# Patient Record
Sex: FEMALE | Race: WHITE | Employment: UNEMPLOYED | URBAN - METROPOLITAN AREA
[De-identification: names, ages, dates, MRNs, and addresses within clinical notes are randomized per-mention and may not be internally consistent; named-entity substitution may affect disease eponyms.]

---

## 2017-04-25 PROBLEM — L03.115 CELLULITIS OF RIGHT LEG: Status: ACTIVE | Noted: 2017-04-25

## 2017-04-26 PROBLEM — D50.9 IRON DEFICIENCY ANEMIA: Status: ACTIVE | Noted: 2017-04-26

## 2017-04-27 PROBLEM — N95.0 POSTMENOPAUSAL BLEEDING: Status: ACTIVE | Noted: 2017-04-27

## 2017-10-16 PROBLEM — I89.0 LYMPHEDEMA OF BOTH LOWER EXTREMITIES: Chronic | Status: ACTIVE | Noted: 2017-10-16

## 2018-03-27 ENCOUNTER — TELEPHONE (OUTPATIENT)
Dept: PRIMARY CARE CLINIC | Age: 58
End: 2018-03-27

## 2018-03-27 NOTE — TELEPHONE ENCOUNTER
Kary Ring from [facility] called in regards to the PT that at this time she does not have a caregiver but they are actively looking for someone to come out to her home to care for her.

## 2018-03-29 ENCOUNTER — OFFICE VISIT (OUTPATIENT)
Dept: PRIMARY CARE CLINIC | Age: 58
End: 2018-03-29
Payer: MEDICARE

## 2018-03-29 VITALS
HEIGHT: 63 IN | BODY MASS INDEX: 51.91 KG/M2 | TEMPERATURE: 98.7 F | OXYGEN SATURATION: 98 % | WEIGHT: 293 LBS | SYSTOLIC BLOOD PRESSURE: 138 MMHG | DIASTOLIC BLOOD PRESSURE: 84 MMHG | HEART RATE: 90 BPM

## 2018-03-29 DIAGNOSIS — I69.359 HEMIPARESIS FOLLOWING CEREBROVASCULAR ACCIDENT (CVA) (HCC): Primary | Chronic | ICD-10-CM

## 2018-03-29 DIAGNOSIS — G44.89 OTHER HEADACHE SYNDROME: ICD-10-CM

## 2018-03-29 DIAGNOSIS — I81 PVT (PORTAL VEIN THROMBOSIS): ICD-10-CM

## 2018-03-29 DIAGNOSIS — R29.898 ANKLE WEAKNESS: ICD-10-CM

## 2018-03-29 LAB
INTERNATIONAL NORMALIZATION RATIO, POC: 2.1
PROTHROMBIN TIME, POC: NORMAL

## 2018-03-29 PROCEDURE — 85610 PROTHROMBIN TIME: CPT | Performed by: FAMILY MEDICINE

## 2018-03-29 PROCEDURE — 1036F TOBACCO NON-USER: CPT | Performed by: FAMILY MEDICINE

## 2018-03-29 PROCEDURE — 3017F COLORECTAL CA SCREEN DOC REV: CPT | Performed by: FAMILY MEDICINE

## 2018-03-29 PROCEDURE — 3014F SCREEN MAMMO DOC REV: CPT | Performed by: FAMILY MEDICINE

## 2018-03-29 PROCEDURE — G8598 ASA/ANTIPLAT THER USED: HCPCS | Performed by: FAMILY MEDICINE

## 2018-03-29 PROCEDURE — G8417 CALC BMI ABV UP PARAM F/U: HCPCS | Performed by: FAMILY MEDICINE

## 2018-03-29 PROCEDURE — G8427 DOCREV CUR MEDS BY ELIG CLIN: HCPCS | Performed by: FAMILY MEDICINE

## 2018-03-29 PROCEDURE — 99214 OFFICE O/P EST MOD 30 MIN: CPT | Performed by: FAMILY MEDICINE

## 2018-03-29 PROCEDURE — G8484 FLU IMMUNIZE NO ADMIN: HCPCS | Performed by: FAMILY MEDICINE

## 2018-03-29 RX ORDER — GABAPENTIN 400 MG/1
300 CAPSULE ORAL 2 TIMES DAILY
Qty: 60 CAPSULE | Refills: 2 | Status: SHIPPED | OUTPATIENT
Start: 2018-03-29 | End: 2018-11-29

## 2018-03-29 ASSESSMENT — ENCOUNTER SYMPTOMS
BLURRED VISION: 0
PHOTOPHOBIA: 0
DIARRHEA: 0
BACK PAIN: 0
NAUSEA: 0
FACIAL SWEATING: 1
RHINORRHEA: 1
ABDOMINAL PAIN: 0
VOMITING: 0
WHEEZING: 0
SHORTNESS OF BREATH: 0
CONSTIPATION: 0
SORE THROAT: 0
SINUS PAIN: 0
SINUS PRESSURE: 0
CHEST TIGHTNESS: 0

## 2018-03-29 NOTE — PROGRESS NOTES
Jeff Celaya, a female of 56 y.o. came to the office 3/29/2018. Patient Active Problem List   Diagnosis    OA (osteoarthritis)    Hypothyroidism    JOSE on CPAP    HTN (hypertension)    PFO with atrial septal aneurysm    Adult BMI >=70 kg/sq m (HCC)    Rt Hemiparesis [facility] cerebrovascular accident ([facility]A) [facility] [address]    Neuropathy ([facility] [address]    Depression    Venous insufficiency of both lower extremities    FLAVIO (generalized anxiety disorder)    Varicose veins of left leg with edema    Cellulitis of right leg    Iron deficiency anemia    Postmenopausal vaginal bleeding    Complex endometrial hyperplasia with atypia    Lymphedema of both lower extremities          Headache    This is a new problem. Episode onset: 2 weeks ago. The problem occurs intermittently (4 times a week ). The problem has been unchanged. The pain is located in the frontal region. The pain does not radiate. The pain quality is not similar to prior headaches. The quality of the pain is described as aching. The pain is at a severity of 8/10. Associated symptoms include facial sweating, neck pain (yesterday occiput ) and rhinorrhea. Pertinent negatives include no abdominal pain, anorexia, back pain, blurred vision, dizziness, nausea, phonophobia, photophobia, sinus pressure, sore throat, tinnitus or vomiting. Nothing aggravates the symptoms. She has tried acetaminophen for the symptoms. The treatment provided moderate relief. Her past medical history is significant for obesity. MSK: Complains of cramps in right leg for past 3 weeks. Almost daily, icy hot and bannanas help. Has been on lipitor since 2015 with out cramps. Still notes right sided swelling. States that face, arm and leg swelling. Right ankle hurts and feels as though it might give out. Increased gabapentin to 400 mg bid for leg numbness and arm on R side. It's starting to help but it's only a wk.      Allergies   Allergen Reactions    Pcn [Penicillins] Shortness Of Breath    Penicillin G Shortness Of Breath       Current Outpatient Prescriptions on File Prior to Visit   Medication Sig Dispense Refill    levothyroxine (SYNTHROID) 175 MCG tablet Take 1 tablet by mouth daily 30 tablet 5    baclofen (LIORESAL) 10 MG tablet Take 1 tablet by mouth See Admin Instructions Take 1/2 tablet twice a day. 30 tablet 5    vitamin D (ERGOCALCIFEROL) 60624 units CAPS capsule Take 1 capsule by mouth once a week 12 capsule 1    warfarin (COUMADIN) 2.5 MG tablet Take 2.5 mg by mouth daily 1 day a week, Tuesday      acetaminophen (TYLENOL ARTHRITIS PAIN) 650 MG extended release tablet Take 1,300 mg by mouth every 8 hours as needed for Pain      lisinopril (PRINIVIL;ZESTRIL) 5 MG tablet TAKE 1/2 TABLET BY MOUTH EVERY DAY 15 tablet 5    busPIRone (BUSPAR) 15 MG tablet TAKE 1/2 TABLET TWICE A DAY 30 tablet 5    warfarin (COUMADIN) 5 MG tablet TAKE 1 TABLET DAILY AS DIRECTED (Patient taking differently: Take 5 mg by mouth daily TAKE 1 TABLET DAILY AS DIRECTED) 30 tablet 5    atorvastatin (LIPITOR) 20 MG tablet Take 1 tablet by mouth nightly 30 tablet 5    citalopram (CELEXA) 40 MG tablet TAKE ONE TABLET BY MOUTH EVERY DAY 30 tablet 5    folic acid (FOLVITE) 1 MG tablet Take 1 tablet by mouth daily 30 tablet 5    CPAP Machine MISC by Does not apply route      levonorgestrel (MIRENA, 52 MG,) IUD 52 mg 1 each by Intrauterine route once      Misc. Devices (GEL-FOAM CUSHION) MISC For chair 1 each 0     No current facility-administered medications on file prior to visit. Review of Systems   Constitutional: Positive for fatigue. HENT: Positive for rhinorrhea. Negative for postnasal drip, sinus pain, sinus pressure, sneezing, sore throat and tinnitus. Eyes: Negative for blurred vision, photophobia and visual disturbance. Respiratory: Negative for chest tightness, shortness of breath and wheezing. Cardiovascular: Negative for chest pain, palpitations and leg swelling. Gastrointestinal: Negative for abdominal pain, anorexia, constipation, diarrhea, nausea and vomiting. Musculoskeletal: Positive for myalgias (right leg) and neck pain (yesterday occiput ). Negative for back pain. Neurological: Positive for headaches. Negative for dizziness. All other review of systems reviewed and are negative    OBJECTIVE:  /84   Pulse 90   Temp 98.7 °F (37.1 °C)   Ht 5' 3\" (1.6 m)   Wt (!) 441 lb (200 kg)   SpO2 98%   BMI 78.12 kg/m²      Physical Exam   Constitutional: She is oriented to person, place, and time. She appears well-developed and well-nourished. HENT:   Head: Normocephalic and atraumatic. Eyes: Conjunctivae and EOM are normal. Pupils are equal, round, and reactive to light. Neck: Normal range of motion. Neck supple. No JVD present. No tracheal deviation present. No thyromegaly present. Cardiovascular: Normal rate, regular rhythm, normal heart sounds and intact distal pulses. Exam reveals no gallop and no friction rub. No murmur heard. Pulmonary/Chest: Effort normal and breath sounds normal. No respiratory distress. She has no wheezes. She has no rales. She exhibits no tenderness. Abdominal: Soft. Bowel sounds are normal. She exhibits no distension and no mass. There is no tenderness. There is no rebound and no guarding. Musculoskeletal: Normal range of motion. She exhibits edema (right leg increased swelling ). Neurological: She is alert and oriented to person, place, and time. No cranial nerve deficit. Skin: Skin is warm and dry. No rash noted. No erythema. No pallor. ASSESSMENT AND PLAN:    Rowena Bojorquez was seen today for headache and leg swelling.     Diagnoses and all orders for this visit:    Rt Hemiparesis following cerebrovascular accident (CVA) ([address]    PVT (portal vein thrombosis)  -     POCT INR    Other headache syndrome    Adult BMI >=70 kg/sq m ([address]  -     [facility] Dedrick Gitelman., MD  -     Cancel:

## 2018-04-05 DIAGNOSIS — E03.9 ACQUIRED HYPOTHYROIDISM: Chronic | ICD-10-CM

## 2018-04-05 RX ORDER — LEVOTHYROXINE SODIUM 175 UG/1
175 TABLET ORAL DAILY
Qty: 30 TABLET | Refills: 5 | Status: SHIPPED | OUTPATIENT
Start: 2018-04-05 | End: 2019-01-03 | Stop reason: SDUPTHER

## 2018-04-13 ENCOUNTER — TELEPHONE (OUTPATIENT)
Dept: PRIMARY CARE CLINIC | Age: 58
End: 2018-04-13

## 2018-04-13 DIAGNOSIS — M17.0 PRIMARY OSTEOARTHRITIS OF BOTH KNEES: Primary | Chronic | ICD-10-CM

## 2018-04-19 ENCOUNTER — PATIENT MESSAGE (OUTPATIENT)
Dept: PRIMARY CARE CLINIC | Age: 58
End: 2018-04-19

## 2018-04-30 ENCOUNTER — INITIAL CONSULT (OUTPATIENT)
Dept: BARIATRICS/WEIGHT MGMT | Age: 58
End: 2018-04-30
Payer: COMMERCIAL

## 2018-04-30 VITALS
HEART RATE: 87 BPM | SYSTOLIC BLOOD PRESSURE: 139 MMHG | DIASTOLIC BLOOD PRESSURE: 83 MMHG | TEMPERATURE: 97.9 F | HEIGHT: 63 IN

## 2018-04-30 DIAGNOSIS — I10 ESSENTIAL HYPERTENSION: Primary | Chronic | ICD-10-CM

## 2018-04-30 PROCEDURE — 99205 OFFICE O/P NEW HI 60 MIN: CPT | Performed by: INTERNAL MEDICINE

## 2018-04-30 PROCEDURE — 99202 OFFICE O/P NEW SF 15 MIN: CPT

## 2018-04-30 RX ORDER — GABAPENTIN 400 MG/1
400 CAPSULE ORAL 2 TIMES DAILY
COMMUNITY
End: 2018-05-31 | Stop reason: SDUPTHER

## 2018-05-08 RX ORDER — FOLIC ACID 1 MG/1
TABLET ORAL
Qty: 30 TABLET | Refills: 5 | Status: SHIPPED | OUTPATIENT
Start: 2018-05-08 | End: 2018-11-05 | Stop reason: SDUPTHER

## 2018-05-25 ENCOUNTER — TELEPHONE (OUTPATIENT)
Dept: PRIMARY CARE CLINIC | Age: 58
End: 2018-05-25

## 2018-05-31 RX ORDER — GABAPENTIN 400 MG/1
400 CAPSULE ORAL 2 TIMES DAILY
Qty: 60 CAPSULE | Refills: 3 | Status: SHIPPED | OUTPATIENT
Start: 2018-05-31 | End: 2018-10-25 | Stop reason: SDUPTHER

## 2018-06-12 DIAGNOSIS — F41.9 ANXIETY: ICD-10-CM

## 2018-06-12 RX ORDER — CITALOPRAM 40 MG/1
TABLET ORAL
Qty: 30 TABLET | Refills: 5 | Status: SHIPPED | OUTPATIENT
Start: 2018-06-12 | End: 2018-06-21 | Stop reason: SDUPTHER

## 2018-06-21 DIAGNOSIS — F41.9 ANXIETY: ICD-10-CM

## 2018-06-21 DIAGNOSIS — I10 BENIGN ESSENTIAL HTN: ICD-10-CM

## 2018-06-21 RX ORDER — BACLOFEN 10 MG/1
10 TABLET ORAL SEE ADMIN INSTRUCTIONS
Qty: 30 TABLET | Refills: 5 | Status: SHIPPED | OUTPATIENT
Start: 2018-06-21 | End: 2019-05-03 | Stop reason: SDUPTHER

## 2018-06-21 RX ORDER — LISINOPRIL 5 MG/1
2.5 TABLET ORAL DAILY
Qty: 15 TABLET | Refills: 5 | Status: SHIPPED | OUTPATIENT
Start: 2018-06-21 | End: 2019-04-10

## 2018-06-21 RX ORDER — ATORVASTATIN CALCIUM 20 MG/1
20 TABLET, FILM COATED ORAL NIGHTLY
Qty: 30 TABLET | Refills: 5 | Status: SHIPPED | OUTPATIENT
Start: 2018-06-21 | End: 2019-01-04 | Stop reason: SDUPTHER

## 2018-06-21 RX ORDER — CITALOPRAM 40 MG/1
40 TABLET ORAL DAILY
Qty: 30 TABLET | Refills: 5 | Status: SHIPPED | OUTPATIENT
Start: 2018-06-21 | End: 2018-11-26 | Stop reason: SDUPTHER

## 2018-06-21 RX ORDER — BUSPIRONE HYDROCHLORIDE 15 MG/1
7.5 TABLET ORAL 2 TIMES DAILY
Qty: 30 TABLET | Refills: 5 | Status: SHIPPED | OUTPATIENT
Start: 2018-06-21 | End: 2018-11-26 | Stop reason: SDUPTHER

## 2018-06-21 RX ORDER — WARFARIN SODIUM 5 MG/1
TABLET ORAL
Qty: 30 TABLET | Refills: 5 | Status: SHIPPED | OUTPATIENT
Start: 2018-06-21 | End: 2019-01-04 | Stop reason: SDUPTHER

## 2018-07-31 ENCOUNTER — OFFICE VISIT (OUTPATIENT)
Dept: PRIMARY CARE CLINIC | Age: 58
End: 2018-07-31
Payer: COMMERCIAL

## 2018-07-31 ENCOUNTER — HOSPITAL ENCOUNTER (OUTPATIENT)
Age: 58
Discharge: HOME | End: 2018-08-02
Payer: COMMERCIAL

## 2018-07-31 ENCOUNTER — ANTI-COAG VISIT (OUTPATIENT)
Dept: PRIMARY CARE CLINIC | Age: 58
End: 2018-07-31

## 2018-07-31 VITALS
SYSTOLIC BLOOD PRESSURE: 128 MMHG | DIASTOLIC BLOOD PRESSURE: 78 MMHG | TEMPERATURE: 98.2 F | OXYGEN SATURATION: 98 % | WEIGHT: 293 LBS | HEART RATE: 71 BPM | BODY MASS INDEX: 78.12 KG/M2

## 2018-07-31 DIAGNOSIS — I81 PVT (PORTAL VEIN THROMBOSIS): Primary | ICD-10-CM

## 2018-07-31 DIAGNOSIS — I10 BENIGN ESSENTIAL HTN: ICD-10-CM

## 2018-07-31 DIAGNOSIS — E03.9 ACQUIRED HYPOTHYROIDISM: ICD-10-CM

## 2018-07-31 DIAGNOSIS — E78.5 HYPERLIPIDEMIA, UNSPECIFIED HYPERLIPIDEMIA TYPE: ICD-10-CM

## 2018-07-31 LAB
ALBUMIN SERPL-MCNC: 4 G/DL (ref 3.5–5.2)
ALP BLD-CCNC: 107 U/L (ref 35–104)
ALT SERPL-CCNC: 12 U/L (ref 0–32)
ANION GAP SERPL CALCULATED.3IONS-SCNC: 17 MMOL/L (ref 7–16)
AST SERPL-CCNC: 15 U/L (ref 0–31)
BILIRUB SERPL-MCNC: 0.4 MG/DL (ref 0–1.2)
BUN BLDV-MCNC: 23 MG/DL (ref 6–20)
CALCIUM SERPL-MCNC: 9.5 MG/DL (ref 8.6–10.2)
CHLORIDE BLD-SCNC: 99 MMOL/L (ref 98–107)
CHOLESTEROL, TOTAL: 109 MG/DL (ref 0–199)
CO2: 25 MMOL/L (ref 22–29)
CREAT SERPL-MCNC: 1.5 MG/DL (ref 0.5–1)
GFR AFRICAN AMERICAN: 43
GFR NON-AFRICAN AMERICAN: 36 ML/MIN/1.73
GLUCOSE BLD-MCNC: 99 MG/DL (ref 74–109)
HDLC SERPL-MCNC: 43 MG/DL
INTERNATIONAL NORMALIZATION RATIO, POC: 3.9
LDL CHOLESTEROL CALCULATED: 43 MG/DL (ref 0–99)
MAGNESIUM: 2.1 MG/DL (ref 1.6–2.6)
POTASSIUM SERPL-SCNC: 4.4 MMOL/L (ref 3.5–5)
PROTHROMBIN TIME, POC: NORMAL
SODIUM BLD-SCNC: 141 MMOL/L (ref 132–146)
TOTAL PROTEIN: 7.9 G/DL (ref 6.4–8.3)
TRIGL SERPL-MCNC: 117 MG/DL (ref 0–149)
TSH SERPL DL<=0.05 MIU/L-ACNC: 2.1 UIU/ML (ref 0.27–4.2)
VLDLC SERPL CALC-MCNC: 23 MG/DL

## 2018-07-31 PROCEDURE — 93793 ANTICOAG MGMT PT WARFARIN: CPT | Performed by: FAMILY MEDICINE

## 2018-07-31 PROCEDURE — 80053 COMPREHEN METABOLIC PANEL: CPT

## 2018-07-31 PROCEDURE — 83735 ASSAY OF MAGNESIUM: CPT

## 2018-07-31 PROCEDURE — 99213 OFFICE O/P EST LOW 20 MIN: CPT | Performed by: FAMILY MEDICINE

## 2018-07-31 PROCEDURE — 80061 LIPID PANEL: CPT

## 2018-07-31 PROCEDURE — G8427 DOCREV CUR MEDS BY ELIG CLIN: HCPCS | Performed by: FAMILY MEDICINE

## 2018-07-31 PROCEDURE — G8598 ASA/ANTIPLAT THER USED: HCPCS | Performed by: FAMILY MEDICINE

## 2018-07-31 PROCEDURE — 84443 ASSAY THYROID STIM HORMONE: CPT

## 2018-07-31 PROCEDURE — 85610 PROTHROMBIN TIME: CPT | Performed by: FAMILY MEDICINE

## 2018-07-31 PROCEDURE — 1036F TOBACCO NON-USER: CPT | Performed by: FAMILY MEDICINE

## 2018-07-31 PROCEDURE — G8417 CALC BMI ABV UP PARAM F/U: HCPCS | Performed by: FAMILY MEDICINE

## 2018-07-31 PROCEDURE — 3017F COLORECTAL CA SCREEN DOC REV: CPT | Performed by: FAMILY MEDICINE

## 2018-07-31 ASSESSMENT — ENCOUNTER SYMPTOMS
ABDOMINAL PAIN: 0
DIARRHEA: 0
CONSTIPATION: 0
SHORTNESS OF BREATH: 0
COUGH: 0

## 2018-07-31 NOTE — PROGRESS NOTES
Arlie Carrel, a female of 66 y.o. came to the office 7/31/2018. Patient Active Problem List   Diagnosis    OA (osteoarthritis)    Hypothyroidism    JOSE on CPAP    HTN (hypertension)    PFO with atrial septal aneurysm    Adult BMI >=70 kg/sq m (HCC)    Rt Hemiparesis following [facility]vascular [facility] ([facility]A) [facility] [address]    Neuropathy ([facility] [address]    Depression    Venous insufficiency of both lower extremities    FLAVIO (generalized anxiety disorder)    Varicose veins of left leg with edema    Cellulitis of right leg    Iron deficiency anemia    Postmenopausal vaginal bleeding    Complex endometrial hyperplasia with atypia    Lymphedema of both lower extremities          Hypertension   This is a chronic problem. The problem is unchanged. The problem is controlled. Associated symptoms include headaches (Spot at base of left occiput if you touch the spot or she moves her neck she gets a headche). Pertinent negatives include no chest pain, palpitations or shortness of breath. Risk factors for coronary artery disease include obesity and sedentary lifestyle. Past treatments include ACE inhibitors. The current treatment provides mild improvement. There are no compliance problems. hypothyroidism: no prob with med. Gen: went and saw dietician and has cut changed regular pop to diet.      Allergies   Allergen Reactions    Pcn [Penicillins] Shortness Of Breath    Penicillin G Shortness Of Breath       Current Outpatient Prescriptions on File Prior to Visit   Medication Sig Dispense Refill    atorvastatin (LIPITOR) 20 MG tablet Take 1 tablet by mouth nightly 30 tablet 5    warfarin (COUMADIN) 5 MG tablet TAKE 1 TABLET DAILY AS DIRECTED 30 tablet 5    busPIRone (BUSPAR) 15 MG tablet Take 7.5 mg by mouth 2 times daily 30 tablet 5    lisinopril (PRINIVIL;ZESTRIL) 5 MG tablet Take 0.5 tablets by mouth daily 15 tablet 5    baclofen (LIORESAL) 10 MG tablet Take 1 tablet by mouth See Admin Instructions Take 1/2 tablet twice a day. 30 tablet 5    citalopram (CELEXA) 40 MG tablet Take 1 tablet by mouth daily 30 tablet 5    folic acid (FOLVITE) 1 MG tablet TAKE 1 TABLET BY MOUTH ONCE EVERY DAY 30 tablet 5    Magnesium Cl-Calcium Carbonate (SLOW-MAG PO) Take by mouth nightly      Lift Chair MISC by Does not apply route 1 each 0    levothyroxine (SYNTHROID) 175 MCG tablet Take 1 tablet by mouth daily 30 tablet 5    vitamin D (ERGOCALCIFEROL) 75798 units CAPS capsule Take 1 capsule by mouth once a week 12 capsule 1    warfarin (COUMADIN) 2.5 MG tablet Take 2.5 mg by mouth daily 1 day a week, Tuesday      acetaminophen (TYLENOL ARTHRITIS PAIN) 650 MG extended release tablet Take 1,300 mg by mouth every 8 hours as needed for Pain      CPAP Machine MISC by Does not apply route      levonorgestrel (MIRENA, 52 MG,) IUD 52 mg 1 each by Intrauterine route once      Misc. Devices (GEL-FOAM CUSHION) MISC For chair 1 each 0    gabapentin (NEURONTIN) 400 MG capsule Take 1 capsule by mouth 2 times daily for 30 days. . 60 capsule 3    gabapentin (NEURONTIN) 400 MG capsule Take 1 capsule by mouth 2 times daily for 31 days Taking 400mg bid. 60 capsule 2     No current facility-administered medications on file prior to visit. Review of Systems   Constitutional: Negative for appetite change, chills, fatigue and fever. Respiratory: Negative for cough and shortness of breath. Cardiovascular: Positive for leg swelling. Negative for chest pain and palpitations. Gastrointestinal: Negative for abdominal pain, constipation and diarrhea. Endocrine: Negative for polydipsia and polyuria. Musculoskeletal: Positive for myalgias (R thigh burning pain). Neurological: Positive for headaches (Spot at base of left occiput if you touch the spot or she moves her neck she gets a headche). Psychiatric/Behavioral: Positive for sleep disturbance (Feels like she has restless leg syndrome when she tried to sleep at night).      All other review

## 2018-08-01 ENCOUNTER — PATIENT MESSAGE (OUTPATIENT)
Dept: PRIMARY CARE CLINIC | Age: 58
End: 2018-08-01

## 2018-08-01 RX ORDER — ROPINIROLE 0.5 MG/1
0.5 TABLET, FILM COATED ORAL NIGHTLY
Qty: 30 TABLET | Refills: 3 | Status: SHIPPED | OUTPATIENT
Start: 2018-08-01 | End: 2018-11-02 | Stop reason: SDUPTHER

## 2018-08-08 ENCOUNTER — TELEPHONE (OUTPATIENT)
Dept: BARIATRICS/WEIGHT MGMT | Age: 58
End: 2018-08-08

## 2018-08-08 NOTE — TELEPHONE ENCOUNTER
Patient called to cx due to not having an aid to help her with the appointment. She will call when she gets a new aid.

## 2018-08-27 ENCOUNTER — TELEPHONE (OUTPATIENT)
Dept: PRIMARY CARE CLINIC | Age: 58
End: 2018-08-27

## 2018-10-26 RX ORDER — GABAPENTIN 400 MG/1
CAPSULE ORAL
Qty: 60 CAPSULE | Refills: 0 | Status: SHIPPED | OUTPATIENT
Start: 2018-10-26 | End: 2018-11-05 | Stop reason: SDUPTHER

## 2018-11-05 DIAGNOSIS — E55.9 VITAMIN D DEFICIENCY: ICD-10-CM

## 2018-11-05 RX ORDER — GABAPENTIN 400 MG/1
400 CAPSULE ORAL 2 TIMES DAILY
Qty: 60 CAPSULE | Refills: 5 | Status: SHIPPED | OUTPATIENT
Start: 2018-11-05 | End: 2018-11-26 | Stop reason: SDUPTHER

## 2018-11-05 RX ORDER — ERGOCALCIFEROL 1.25 MG/1
50000 CAPSULE ORAL WEEKLY
Qty: 12 CAPSULE | Refills: 1 | Status: SHIPPED | OUTPATIENT
Start: 2018-11-05 | End: 2018-11-26 | Stop reason: SDUPTHER

## 2018-11-05 RX ORDER — FOLIC ACID 1 MG/1
1 TABLET ORAL DAILY
Qty: 30 TABLET | Refills: 5 | Status: SHIPPED | OUTPATIENT
Start: 2018-11-05 | End: 2018-11-26 | Stop reason: SDUPTHER

## 2018-11-26 DIAGNOSIS — F41.9 ANXIETY: ICD-10-CM

## 2018-11-26 DIAGNOSIS — E55.9 VITAMIN D DEFICIENCY: ICD-10-CM

## 2018-11-26 RX ORDER — BUSPIRONE HYDROCHLORIDE 15 MG/1
7.5 TABLET ORAL 2 TIMES DAILY
Qty: 30 TABLET | Refills: 5 | Status: SHIPPED | OUTPATIENT
Start: 2018-11-26 | End: 2019-01-03 | Stop reason: SDUPTHER

## 2018-11-26 RX ORDER — ERGOCALCIFEROL 1.25 MG/1
50000 CAPSULE ORAL WEEKLY
Qty: 12 CAPSULE | Refills: 1 | Status: SHIPPED | OUTPATIENT
Start: 2018-11-26 | End: 2019-05-03

## 2018-11-26 RX ORDER — GABAPENTIN 400 MG/1
400 CAPSULE ORAL 2 TIMES DAILY
Qty: 60 CAPSULE | Refills: 5 | Status: SHIPPED | OUTPATIENT
Start: 2018-11-26 | End: 2019-05-08

## 2018-11-26 RX ORDER — FOLIC ACID 1 MG/1
1 TABLET ORAL DAILY
Qty: 30 TABLET | Refills: 5 | Status: SHIPPED | OUTPATIENT
Start: 2018-11-26 | End: 2019-02-21 | Stop reason: SDUPTHER

## 2018-11-26 RX ORDER — CITALOPRAM 40 MG/1
40 TABLET ORAL DAILY
Qty: 30 TABLET | Refills: 5 | Status: SHIPPED | OUTPATIENT
Start: 2018-11-26 | End: 2019-02-21 | Stop reason: SDUPTHER

## 2018-11-29 ENCOUNTER — OFFICE VISIT (OUTPATIENT)
Dept: PRIMARY CARE CLINIC | Age: 58
End: 2018-11-29
Payer: COMMERCIAL

## 2018-11-29 VITALS
DIASTOLIC BLOOD PRESSURE: 74 MMHG | HEART RATE: 74 BPM | BODY MASS INDEX: 51.91 KG/M2 | OXYGEN SATURATION: 98 % | SYSTOLIC BLOOD PRESSURE: 128 MMHG | TEMPERATURE: 97.5 F | WEIGHT: 293 LBS | HEIGHT: 63 IN

## 2018-11-29 DIAGNOSIS — I69.359 HEMIPARESIS FOLLOWING CEREBROVASCULAR ACCIDENT (CVA) (HCC): Chronic | ICD-10-CM

## 2018-11-29 DIAGNOSIS — I10 ESSENTIAL HYPERTENSION: Primary | Chronic | ICD-10-CM

## 2018-11-29 DIAGNOSIS — I63.49 CEREBRAL INFARCTION DUE TO EMBOLISM OF OTHER CEREBRAL ARTERY (HCC): ICD-10-CM

## 2018-11-29 DIAGNOSIS — J01.20 ACUTE NON-RECURRENT ETHMOIDAL SINUSITIS: ICD-10-CM

## 2018-11-29 LAB
INTERNATIONAL NORMALIZATION RATIO, POC: 3.2
PROTHROMBIN TIME, POC: NORMAL

## 2018-11-29 PROCEDURE — 85610 PROTHROMBIN TIME: CPT | Performed by: FAMILY MEDICINE

## 2018-11-29 PROCEDURE — G8427 DOCREV CUR MEDS BY ELIG CLIN: HCPCS | Performed by: FAMILY MEDICINE

## 2018-11-29 PROCEDURE — G8598 ASA/ANTIPLAT THER USED: HCPCS | Performed by: FAMILY MEDICINE

## 2018-11-29 PROCEDURE — 99213 OFFICE O/P EST LOW 20 MIN: CPT | Performed by: FAMILY MEDICINE

## 2018-11-29 PROCEDURE — 1036F TOBACCO NON-USER: CPT | Performed by: FAMILY MEDICINE

## 2018-11-29 PROCEDURE — G8484 FLU IMMUNIZE NO ADMIN: HCPCS | Performed by: FAMILY MEDICINE

## 2018-11-29 PROCEDURE — G8417 CALC BMI ABV UP PARAM F/U: HCPCS | Performed by: FAMILY MEDICINE

## 2018-11-29 PROCEDURE — 3017F COLORECTAL CA SCREEN DOC REV: CPT | Performed by: FAMILY MEDICINE

## 2018-11-29 RX ORDER — BENZONATATE 100 MG/1
100 CAPSULE ORAL 3 TIMES DAILY PRN
Qty: 30 CAPSULE | Refills: 0 | Status: SHIPPED | OUTPATIENT
Start: 2018-11-29 | End: 2019-04-08

## 2018-11-29 RX ORDER — AZITHROMYCIN 250 MG/1
TABLET, FILM COATED ORAL
Qty: 1 PACKET | Refills: 0 | Status: SHIPPED | OUTPATIENT
Start: 2018-11-29 | End: 2019-02-21 | Stop reason: ALTCHOICE

## 2018-11-29 ASSESSMENT — PATIENT HEALTH QUESTIONNAIRE - PHQ9
SUM OF ALL RESPONSES TO PHQ9 QUESTIONS 1 & 2: 0
SUM OF ALL RESPONSES TO PHQ QUESTIONS 1-9: 0
1. LITTLE INTEREST OR PLEASURE IN DOING THINGS: 0
SUM OF ALL RESPONSES TO PHQ QUESTIONS 1-9: 0
2. FEELING DOWN, DEPRESSED OR HOPELESS: 0

## 2018-11-29 ASSESSMENT — ENCOUNTER SYMPTOMS
SORE THROAT: 1
COUGH: 1
WHEEZING: 0
RHINORRHEA: 1
SHORTNESS OF BREATH: 0

## 2018-11-29 NOTE — PROGRESS NOTES
Bhavesh Burger, a female of 66 y.o. came to the office 11/29/2018. Patient Active Problem List   Diagnosis    OA (osteoarthritis)    Hypothyroidism    JOSE on CPAP    HTN (hypertension)    PFO with atrial septal aneurysm    Adult BMI >=70 kg/sq m (HCC)    Rt Hemiparesis [facility] cerebrovascular accident ([facility]A) [facility] [address]    Neuropathy    Depression    Venous insufficiency of both lower extremities    FLAVIO (generalized anxiety disorder)    Varicose veins of left leg with edema    Cellulitis of right leg    Iron deficiency anemia    Postmenopausal vaginal bleeding    Complex endometrial hyperplasia with atypia    Lymphedema of both lower extremities          Hypertension   This is a chronic problem. The current episode started more than 1 year ago. The problem is controlled. Pertinent negatives include no chest pain, headaches, palpitations or shortness of breath. Cough   This is a new problem. The current episode started in the past 7 days (11/25). The problem has been gradually worsening. The cough is productive of sputum. Associated symptoms include nasal congestion, postnasal drip, rhinorrhea and a sore throat. Pertinent negatives include no chest pain, chills, fever, headaches, shortness of breath or wheezing. Treatments tried: cough drops. The treatment provided no relief. Allergies   Allergen Reactions    Pcn [Penicillins] Shortness Of Breath    Penicillin G Shortness Of Breath       Current Outpatient Prescriptions on File Prior to Visit   Medication Sig Dispense Refill    vitamin D (ERGOCALCIFEROL) 30182 units CAPS capsule Take 1 capsule by mouth once a week 12 capsule 1    folic acid (FOLVITE) 1 MG tablet Take 1 tablet by mouth daily 30 tablet 5    gabapentin (NEURONTIN) 400 MG capsule Take 1 capsule by mouth 2 times daily for 31 days. . 60 capsule 5    busPIRone (BUSPAR) 15 MG tablet Take 7.5 mg by mouth 2 times daily 30 tablet 5    citalopram (CELEXA) 40 MG tablet Take 1 tablet

## 2019-01-03 DIAGNOSIS — E03.9 ACQUIRED HYPOTHYROIDISM: Chronic | ICD-10-CM

## 2019-01-03 RX ORDER — BUSPIRONE HYDROCHLORIDE 15 MG/1
7.5 TABLET ORAL 2 TIMES DAILY
Qty: 30 TABLET | Refills: 0 | Status: SHIPPED | OUTPATIENT
Start: 2019-01-03 | End: 2019-07-19 | Stop reason: SDUPTHER

## 2019-01-03 RX ORDER — LEVOTHYROXINE SODIUM 175 UG/1
TABLET ORAL
Qty: 30 TABLET | Refills: 0 | Status: SHIPPED | OUTPATIENT
Start: 2019-01-03 | End: 2019-02-01 | Stop reason: SDUPTHER

## 2019-01-04 RX ORDER — WARFARIN SODIUM 5 MG/1
TABLET ORAL
Qty: 30 TABLET | Refills: 5 | Status: SHIPPED | OUTPATIENT
Start: 2019-01-04 | End: 2019-08-14 | Stop reason: SDUPTHER

## 2019-01-04 RX ORDER — ATORVASTATIN CALCIUM 20 MG/1
20 TABLET, FILM COATED ORAL NIGHTLY
Qty: 30 TABLET | Refills: 5 | Status: SHIPPED | OUTPATIENT
Start: 2019-01-04 | End: 2019-07-19 | Stop reason: SDUPTHER

## 2019-02-01 DIAGNOSIS — E03.9 ACQUIRED HYPOTHYROIDISM: Chronic | ICD-10-CM

## 2019-02-01 RX ORDER — LEVOTHYROXINE SODIUM 175 UG/1
175 TABLET ORAL DAILY
Qty: 30 TABLET | Refills: 5 | Status: SHIPPED | OUTPATIENT
Start: 2019-02-01 | End: 2019-07-19 | Stop reason: SDUPTHER

## 2019-02-21 DIAGNOSIS — F41.9 ANXIETY: ICD-10-CM

## 2019-02-21 RX ORDER — CITALOPRAM 40 MG/1
40 TABLET ORAL DAILY
Qty: 30 TABLET | Refills: 5 | Status: SHIPPED | OUTPATIENT
Start: 2019-02-21 | End: 2019-08-14 | Stop reason: SDUPTHER

## 2019-02-21 RX ORDER — FOLIC ACID 1 MG/1
1 TABLET ORAL DAILY
Qty: 30 TABLET | Refills: 5 | Status: SHIPPED | OUTPATIENT
Start: 2019-02-21 | End: 2019-08-14 | Stop reason: SDUPTHER

## 2019-04-08 ENCOUNTER — HOSPITAL ENCOUNTER (OUTPATIENT)
Age: 59
Discharge: HOME | End: 2019-04-10
Payer: COMMERCIAL

## 2019-04-08 ENCOUNTER — OFFICE VISIT (OUTPATIENT)
Dept: PRIMARY CARE CLINIC | Age: 59
End: 2019-04-08
Payer: COMMERCIAL

## 2019-04-08 VITALS
DIASTOLIC BLOOD PRESSURE: 72 MMHG | OXYGEN SATURATION: 97 % | TEMPERATURE: 97 F | HEART RATE: 73 BPM | SYSTOLIC BLOOD PRESSURE: 128 MMHG

## 2019-04-08 DIAGNOSIS — I10 ESSENTIAL HYPERTENSION: ICD-10-CM

## 2019-04-08 DIAGNOSIS — M79.604 PAIN OF RIGHT LOWER EXTREMITY: ICD-10-CM

## 2019-04-08 DIAGNOSIS — E03.9 ACQUIRED HYPOTHYROIDISM: ICD-10-CM

## 2019-04-08 DIAGNOSIS — M25.511 CHRONIC RIGHT SHOULDER PAIN: ICD-10-CM

## 2019-04-08 DIAGNOSIS — E78.5 HYPERLIPIDEMIA, UNSPECIFIED HYPERLIPIDEMIA TYPE: ICD-10-CM

## 2019-04-08 DIAGNOSIS — I81 PVT (PORTAL VEIN THROMBOSIS): Primary | ICD-10-CM

## 2019-04-08 DIAGNOSIS — G89.29 CHRONIC RIGHT SHOULDER PAIN: ICD-10-CM

## 2019-04-08 LAB
ALBUMIN SERPL-MCNC: 3.8 G/DL (ref 3.5–5.2)
ALP BLD-CCNC: 131 U/L (ref 35–104)
ALT SERPL-CCNC: 8 U/L (ref 0–32)
ANION GAP SERPL CALCULATED.3IONS-SCNC: 15 MMOL/L (ref 7–16)
AST SERPL-CCNC: 15 U/L (ref 0–31)
BILIRUB SERPL-MCNC: 0.3 MG/DL (ref 0–1.2)
BUN BLDV-MCNC: 20 MG/DL (ref 6–20)
CALCIUM SERPL-MCNC: 9.3 MG/DL (ref 8.6–10.2)
CHLORIDE BLD-SCNC: 101 MMOL/L (ref 98–107)
CHOLESTEROL, TOTAL: 105 MG/DL (ref 0–199)
CO2: 23 MMOL/L (ref 22–29)
CREAT SERPL-MCNC: 1.4 MG/DL (ref 0.5–1)
GFR AFRICAN AMERICAN: 47
GFR NON-AFRICAN AMERICAN: 39 ML/MIN/1.73
GLUCOSE BLD-MCNC: 102 MG/DL (ref 74–99)
HDLC SERPL-MCNC: 42 MG/DL
INTERNATIONAL NORMALIZATION RATIO, POC: 3.3
LDL CHOLESTEROL CALCULATED: 42 MG/DL (ref 0–99)
POTASSIUM SERPL-SCNC: 4.5 MMOL/L (ref 3.5–5)
PROTHROMBIN TIME, POC: NORMAL
SODIUM BLD-SCNC: 139 MMOL/L (ref 132–146)
TOTAL PROTEIN: 8.2 G/DL (ref 6.4–8.3)
TRIGL SERPL-MCNC: 104 MG/DL (ref 0–149)
TSH SERPL DL<=0.05 MIU/L-ACNC: 1.23 UIU/ML (ref 0.27–4.2)
VLDLC SERPL CALC-MCNC: 21 MG/DL

## 2019-04-08 PROCEDURE — 80053 COMPREHEN METABOLIC PANEL: CPT

## 2019-04-08 PROCEDURE — 1036F TOBACCO NON-USER: CPT | Performed by: FAMILY MEDICINE

## 2019-04-08 PROCEDURE — G8417 CALC BMI ABV UP PARAM F/U: HCPCS | Performed by: FAMILY MEDICINE

## 2019-04-08 PROCEDURE — 3017F COLORECTAL CA SCREEN DOC REV: CPT | Performed by: FAMILY MEDICINE

## 2019-04-08 PROCEDURE — G8427 DOCREV CUR MEDS BY ELIG CLIN: HCPCS | Performed by: FAMILY MEDICINE

## 2019-04-08 PROCEDURE — 99214 OFFICE O/P EST MOD 30 MIN: CPT | Performed by: FAMILY MEDICINE

## 2019-04-08 PROCEDURE — 84443 ASSAY THYROID STIM HORMONE: CPT

## 2019-04-08 PROCEDURE — 80061 LIPID PANEL: CPT

## 2019-04-08 PROCEDURE — 85610 PROTHROMBIN TIME: CPT | Performed by: FAMILY MEDICINE

## 2019-04-08 RX ORDER — TRAMADOL HYDROCHLORIDE 50 MG/1
50 TABLET ORAL 2 TIMES DAILY PRN
Qty: 60 TABLET | Refills: 0 | Status: SHIPPED | OUTPATIENT
Start: 2019-04-08 | End: 2019-08-14 | Stop reason: SDUPTHER

## 2019-04-08 ASSESSMENT — ENCOUNTER SYMPTOMS: SHORTNESS OF BREATH: 0

## 2019-04-08 NOTE — PROGRESS NOTES
Wei Jurado, a female of 66 y.o. came to the office 4/8/2019. Patient Active Problem List   Diagnosis    OA (osteoarthritis)    Hypothyroidism    JOSE on CPAP    HTN (hypertension)    PFO with atrial septal aneurysm    Adult BMI >=70 kg/sq m (HCC)    Rt Hemiparesis following cerebrovascular accident (CVA) ([facility] [address]    Neuropathy    Depression    Venous insufficiency of both lower extremities    FLAVIO (generalized anxiety disorder)    Varicose veins of left leg with edema    Cellulitis of right leg    Iron deficiency anemia    Postmenopausal vaginal bleeding    Complex endometrial hyperplasia with atypia    Lymphedema of both lower extremities          Hypertension   This is a chronic problem. The problem is controlled. Pertinent negatives include no chest pain, headaches, palpitations, peripheral edema or shortness of breath. There are no compliance problems. MS: right thigh and shoulder pain despite tylenol 600 mg. When lays down has rotation of  right foot outward on its own. Endo: unable to lose wt despite doing wt watchers for 2 months.     Allergies   Allergen Reactions    Pcn [Penicillins] Shortness Of Breath    Penicillin G Shortness Of Breath       Current Outpatient Medications on File Prior to Visit   Medication Sig Dispense Refill    folic acid (FOLVITE) 1 MG tablet Take 1 tablet by mouth daily 30 tablet 5    citalopram (CELEXA) 40 MG tablet Take 1 tablet by mouth daily 30 tablet 5    levothyroxine (SYNTHROID) 175 MCG tablet Take 1 tablet by mouth Daily 30 tablet 5    atorvastatin (LIPITOR) 20 MG tablet Take 1 tablet by mouth nightly 30 tablet 5    warfarin (COUMADIN) 5 MG tablet TAKE 1 TABLET DAILY AS DIRECTED 30 tablet 5    busPIRone (BUSPAR) 15 MG tablet Take 7.5 mg by mouth 2 times daily 30 tablet 0    vitamin D (ERGOCALCIFEROL) 64454 units CAPS capsule Take 1 capsule by mouth once a week 12 capsule 1    gabapentin (NEURONTIN) 400 MG capsule Take 1 capsule by mouth 2 times daily for 31 days. . 60 capsule 5    rOPINIRole (REQUIP) 0.5 MG tablet TAKE ONE TABLET BY MOUTH AT BEDTIME 30 tablet 0    lisinopril (PRINIVIL;ZESTRIL) 5 MG tablet Take 0.5 tablets by mouth daily 15 tablet 5    baclofen (LIORESAL) 10 MG tablet Take 1 tablet by mouth See Admin Instructions Take 1/2 tablet twice a day. 30 tablet 5    Magnesium Cl-Calcium Carbonate (SLOW-MAG PO) Take by mouth nightly      Lift Chair MISC by Does not apply route 1 each 0    warfarin (COUMADIN) 2.5 MG tablet Take 2.5 mg by mouth daily 1 day a week, Tuesday      acetaminophen (TYLENOL ARTHRITIS PAIN) 650 MG extended release tablet Take 1,300 mg by mouth every 8 hours as needed for Pain      CPAP Machine MISC by Does not apply route      levonorgestrel (MIRENA, 52 MG,) IUD 52 mg 1 each by Intrauterine route once      Misc. Devices (GEL-FOAM CUSHION) MISC For chair 1 each 0     No current facility-administered medications on file prior to visit. Review of Systems   Respiratory: Negative for shortness of breath. Cardiovascular: Positive for leg swelling. Negative for chest pain and palpitations. Musculoskeletal: Positive for gait problem. Neurological: Negative for headaches. All other review of systems reviewed and are negative    OBJECTIVE:  /72   Pulse 73   Temp 97 °F (36.1 °C)   SpO2 97%      Physical Exam   Constitutional: She is oriented to person, place, and time. She appears well-nourished. Eyes: Conjunctivae are normal. No scleral icterus. Neck: Neck supple. Carotid bruit is not present. No thyromegaly present. Cardiovascular: Normal rate and regular rhythm. No murmur heard. Pulmonary/Chest: Effort normal and breath sounds normal. She has no wheezes. She has no rales. Abdominal: Soft. Bowel sounds are normal. She exhibits no mass. There is no tenderness. There is no rebound and no guarding. Musculoskeletal: She exhibits no edema.         Right shoulder: She exhibits decreased range of motion. Lymphadenopathy:     She has no cervical adenopathy. Neurological: She is alert and oriented to person, place, and time. Skin: Skin is warm and dry. Psychiatric: She has a normal mood and affect. Vitals reviewed. ASSESSMENT AND PLAN:    Lydia Cardoza was seen today for hypertension, hypothyroidism and pain. Diagnoses and all orders for this visit:    PVT (portal vein thrombosis)  -     POCT INR    Acquired hypothyroidism  -     TSH without Reflex; Future    Essential hypertension  -     Comprehensive Metabolic Panel; Future    Hyperlipidemia, unspecified hyperlipidemia type  -     Lipid Panel; Future    Chronic right shoulder pain  -     Cheri Champagne MD, [facility], [place]  -     traMADol (ULTRAM) 50 MG tablet; Take 1 tablet by mouth 2 times daily as needed for Pain for up to 30 days. Pain of right lower extremity  -     Cheri Champagne MD, [facility] [place]  -     traMADol (ULTRAM) 50 MG tablet; Take 1 tablet by mouth 2 times daily as needed for Pain for up to 30 days. Results for Madhuri Sotomayor (MRN [id number]) as of 4/8/2019 11:20   Ref. Range 4/8/2019 11:02   INR Unknown 3.3     - decrease Sat's dose to 1/2 of 5 mg so 2.5 mg tues, thurs, and Sat's. 5 mg all others. - discussed going back to ortho for her leg but she states she has a bill there  - recommend PT or ortho eval for shoulder and leg.   - recommend bariatric surgery but she refuses. Controlled Substances Monitoring:     RX Monitoring 4/8/2019   Attestation The Prescription Monitoring Report for this patient was reviewed today. Chronic Pain Routine Monitoring No signs of potential drug abuse or diversion identified: otherwise, see note documentation       Return in about 4 months (around 8/8/2019) for or for acute problem.     Jodi Disla,

## 2019-04-10 RX ORDER — LISINOPRIL 5 MG/1
TABLET ORAL
Qty: 15 TABLET | Refills: 5 | Status: SHIPPED | OUTPATIENT
Start: 2019-04-10 | End: 2019-07-19 | Stop reason: SDUPTHER

## 2019-05-03 DIAGNOSIS — E55.9 VITAMIN D DEFICIENCY: ICD-10-CM

## 2019-05-03 RX ORDER — BACLOFEN 10 MG/1
TABLET ORAL
Qty: 30 TABLET | Refills: 0 | Status: SHIPPED | OUTPATIENT
Start: 2019-05-03 | End: 2019-07-19 | Stop reason: SDUPTHER

## 2019-05-03 RX ORDER — ERGOCALCIFEROL 1.25 MG/1
50000 CAPSULE ORAL WEEKLY
Qty: 12 CAPSULE | Refills: 0 | Status: SHIPPED | OUTPATIENT
Start: 2019-05-03 | End: 2019-08-14 | Stop reason: SDUPTHER

## 2019-05-08 ENCOUNTER — OFFICE VISIT (OUTPATIENT)
Dept: ORTHOPEDIC SURGERY | Age: 59
End: 2019-05-08
Payer: COMMERCIAL

## 2019-05-08 VITALS
DIASTOLIC BLOOD PRESSURE: 72 MMHG | HEART RATE: 72 BPM | SYSTOLIC BLOOD PRESSURE: 133 MMHG | WEIGHT: 293 LBS | BODY MASS INDEX: 51.91 KG/M2 | HEIGHT: 63 IN

## 2019-05-08 DIAGNOSIS — M25.511 CHRONIC RIGHT SHOULDER PAIN: Primary | ICD-10-CM

## 2019-05-08 DIAGNOSIS — G89.29 CHRONIC RIGHT SHOULDER PAIN: Primary | ICD-10-CM

## 2019-05-08 PROCEDURE — 1036F TOBACCO NON-USER: CPT | Performed by: ORTHOPAEDIC SURGERY

## 2019-05-08 PROCEDURE — G8417 CALC BMI ABV UP PARAM F/U: HCPCS | Performed by: ORTHOPAEDIC SURGERY

## 2019-05-08 PROCEDURE — G8427 DOCREV CUR MEDS BY ELIG CLIN: HCPCS | Performed by: ORTHOPAEDIC SURGERY

## 2019-05-08 PROCEDURE — 99203 OFFICE O/P NEW LOW 30 MIN: CPT | Performed by: ORTHOPAEDIC SURGERY

## 2019-05-08 PROCEDURE — 3017F COLORECTAL CA SCREEN DOC REV: CPT | Performed by: ORTHOPAEDIC SURGERY

## 2019-05-08 RX ORDER — GABAPENTIN 400 MG/1
CAPSULE ORAL
COMMUNITY
Start: 2019-05-03 | End: 2019-05-22 | Stop reason: DRUGHIGH

## 2019-05-08 NOTE — PROGRESS NOTES
Disp: 30 tablet, Rfl: 5    atorvastatin (LIPITOR) 20 MG tablet, Take 1 tablet by mouth nightly, Disp: 30 tablet, Rfl: 5    warfarin (COUMADIN) 5 MG tablet, TAKE 1 TABLET DAILY AS DIRECTED (Patient taking differently: Indications: Sun - Mon - Wed - Fri TAKE 1 TABLET DAILY AS DIRECTED), Disp: 30 tablet, Rfl: 5    busPIRone (BUSPAR) 15 MG tablet, Take 7.5 mg by mouth 2 times daily, Disp: 30 tablet, Rfl: 0    rOPINIRole (REQUIP) 0.5 MG tablet, TAKE ONE TABLET BY MOUTH AT BEDTIME, Disp: 30 tablet, Rfl: 0    Magnesium Cl-Calcium Carbonate (SLOW-MAG PO), Take by mouth nightly, Disp: , Rfl:     Lift Chair MISC, by Does not apply route, Disp: 1 each, Rfl: 0    warfarin (COUMADIN) 2.5 MG tablet, Take 2.5 mg by mouth daily Indications: Sat - Tue - Thurs , Disp: , Rfl:     acetaminophen (TYLENOL ARTHRITIS PAIN) 650 MG extended release tablet, Take 1,300 mg by mouth every 8 hours as needed for Pain, Disp: , Rfl:     CPAP Machine MISC, by Does not apply route, Disp: , Rfl:     levonorgestrel (MIRENA, 52 MG,) IUD 52 mg, 1 each by Intrauterine route once, Disp: , Rfl:     Misc.  Devices (GEL-FOAM CUSHION) MISC, For chair, Disp: 1 each, Rfl: 0    ALLERGIES  Allergies   Allergen Reactions    Pcn [Penicillins] Shortness Of Breath    Penicillin G Shortness Of Breath       Controlled Substances Monitoring:        REVIEW OF SYSTEMS:     Constitutional:  Negative for weight loss, fevers, chills, fatigue  Cardiovascular: Negative for chest pain, palpitations  Pulmonary: Negative for shortness of breath, labored breathing, cough  GI: negative for abdominal pain, nausea, vomitting   MSK: per HPI  Skin: negative for rash, open wounds    All other systems reviewed and are negative           PHYSICAL EXAM     Vitals:    05/08/19 1053   BP: 133/72   Site: Left Lower Arm   Position: Sitting   Cuff Size: Medium Adult   Pulse: 72   Weight: (!) 440 lb (199.6 kg)   Height: 5' 3\" (1.6 m)       Height: 5' 3\" (1.6 m)  Weight: 440 lb  BMI: Body mass index is 77.94 kg/m². General: The patient is alert and oriented x 3, appears to be stated age and in no distress. HEENT: head is normocephalic, atraumatic. EOMI. Neck: supple, trachea midline, no thyromegaly   Cardiovascular: peripheral pulses palpable. Normal Capillary refill   Respiratory: breathing unlabored, chest expansion symmetric   Skin: no rash, no open wounds, no erythema  Psych: normal affect; mood stable  Neurologic: gait normal, sensation grossly intact in extremities  MSK:    Cervical Spine: There is no tenderness to palpation along the cervical spine. Range of motion is normal.  Spurling's is negative    Shoulder Exam:   On exam of the shoulder, exam is limited somewhat by body habitus and by difficulty with effort. She can elevate her arm about 90°. She has diffuse pain with all exam maneuvers of the shoulder. IMAGING:     XRAY:  2 views of the right shoulder were obtained in the office today. These are very poor quality with poor penetration. They show the shoulder to be grossly intact without dislocation. There is no obvious arthritis. Acromiohumeral interval appears to be maintained    Impression: No obvious acute abnormality based on 2 suboptimal views    Radiographic findings reviewed with patient    ASSESSMENT   Right shoulder pain      PLAN  We discussed her shoulder today. Assessment is limited due to poor exam as well as poor quality imaging. I suspect she may have some rotator cuff pathology. Unfortunately, her situation is complicated by her extreme morbid obesity and body habitus, with BMI of 78. I am doubtful We would be able to inject her shoulder in the office. She is not a surgical candidate currently. I would like her to try some physical therapy as she did have some success in the past.  We'll check her back in about 3 months to reassess. We discussed the importance of weight loss for overall health as well as for her shoulder function.   She

## 2019-05-21 ENCOUNTER — PATIENT MESSAGE (OUTPATIENT)
Dept: PRIMARY CARE CLINIC | Age: 59
End: 2019-05-21

## 2019-05-22 ENCOUNTER — HOSPITAL ENCOUNTER (OUTPATIENT)
Dept: PHYSICAL THERAPY | Age: 59
Setting detail: THERAPIES SERIES
Discharge: HOME | End: 2019-05-22
Payer: COMMERCIAL

## 2019-05-22 RX ORDER — GABAPENTIN 600 MG/1
600 TABLET ORAL 2 TIMES DAILY
Qty: 60 TABLET | Refills: 2 | Status: SHIPPED | OUTPATIENT
Start: 2019-05-22 | End: 2019-07-19 | Stop reason: SDUPTHER

## 2019-05-22 NOTE — TELEPHONE ENCOUNTER
Increase in Gabapentin helping her leg pain. I will increase to 600 mg bid and see if this helps even more.

## 2019-05-22 NOTE — PROGRESS NOTES
[address]      Phone: [phone]  Fax: [phone]    Physical Therapy  Cancellation/No-show Note  Patient Name:  Cathleen Vang  :  1960   Date:  2019    For today's appointment patient:  [x]  Cancelled  []  Rescheduled appointment  []  No-show     Reason given by patient:  []  Patient ill  []  Conflicting appointment  [x]  No transportation    []  Conflict with work  []  No reason given  []  Other:     Comments:      Electronically signed by:  Janie Haley PT [id number]

## 2019-05-28 ENCOUNTER — HOSPITAL ENCOUNTER (OUTPATIENT)
Dept: PHYSICAL THERAPY | Age: 59
Setting detail: THERAPIES SERIES
Discharge: HOME | End: 2019-05-28
Payer: COMMERCIAL

## 2019-05-28 PROCEDURE — 97110 THERAPEUTIC EXERCISES: CPT | Performed by: PHYSICAL THERAPIST

## 2019-05-28 PROCEDURE — 97161 PT EVAL LOW COMPLEX 20 MIN: CPT | Performed by: PHYSICAL THERAPIST

## 2019-05-28 ASSESSMENT — PAIN DESCRIPTION - PAIN TYPE: TYPE: CHRONIC PAIN

## 2019-05-28 ASSESSMENT — PAIN DESCRIPTION - FREQUENCY: FREQUENCY: CONTINUOUS

## 2019-05-28 ASSESSMENT — PAIN - FUNCTIONAL ASSESSMENT: PAIN_FUNCTIONAL_ASSESSMENT: PREVENTS OR INTERFERES WITH ALL ACTIVE AND SOME PASSIVE ACTIVITIES

## 2019-05-28 ASSESSMENT — PAIN DESCRIPTION - LOCATION: LOCATION: SHOULDER
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
## 2019-05-28 NOTE — PROGRESS NOTES
[address] [address] [address]      Phone: [phone]  Fax: [phone]    Physical Therapy Daily Treatment Note  Date:  2019    Patient Name:  Edwin Arredondo    :  1960  MRN: [id number]    Restrictions/Precautions:    Diagnosis:  Chronic right shoulder pain  Treatment Diagnosis:    Insurance/Certification information:  [facility]  Referring Physician:  Sarkis Rubio MD  Plan of care signed (Y/N):    Visit# / total visits:    Pain level: \"12\"/10   Time In:  1000  Time Out:  1040    Subjective:  See evaluation     Exercises:  Exercise/Equipment Resistance/Repetitions Other comments     Pulleys 4 minutes       Shoulder flex and abd with wand (on floor) x10 reps each      Elbow flex/ext x10 reps                                                                                                                         Other Therapeutic Activities:  PT evaluation completed    Home Exercise Program:  N/A    Manual Treatments:  N/A    Modalities:  HP to R shoulder x 8 minutes prior to exercises    Timed Code Treatment Minutes:  15    Total Treatment Minutes:  40    Treatment/Activity Tolerance:  [] Patient tolerated treatment well [] Patient limited by fatigue  [x] Patient limited by pain  [] Patient limited by other medical complications  [] Other:     Prognosis: [x] Good [] Fair  [] Poor    Patient Requires Follow-up: [x] Yes  [] No    Plan:   [] Continue per plan of care [] Alter current plan (see comments)  [x] Plan of care initiated [] Hold pending MD visit [] Discharge  Plan for Next Session:        Electronically signed by:  Karen Dunn, PT [id number]

## 2019-05-31 ENCOUNTER — HOSPITAL ENCOUNTER (OUTPATIENT)
Dept: PHYSICAL THERAPY | Age: 59
Setting detail: THERAPIES SERIES
Discharge: HOME | End: 2019-05-31
Payer: COMMERCIAL

## 2019-05-31 PROCEDURE — 97110 THERAPEUTIC EXERCISES: CPT | Performed by: PHYSICAL THERAPIST

## 2019-05-31 NOTE — PROGRESS NOTES
[address]      Phone: [phone]  Fax: [phone]    Physical Therapy Daily Treatment Note  Date:  2019    Patient Name:  Kim Montalvo    :  1960  MRN: [id number]    Restrictions/Precautions:    Diagnosis:  Chronic right shoulder pain  Treatment Diagnosis:    Insurance/Certification information:  [facility]  Referring Physician:  Jose Luis Dias MD  Plan of care signed (Y/N):    Visit# / total visits:   Pain level: 9/10   Time In:  0950  Time Out:  1030    Subjective:  Pt with no new report.     Exercises:  Exercise/Equipment Resistance/Repetitions Other comments     Pulleys 4 minutes       Shoulder flex and abd with wand (on floor) x10 reps each      Elbow flex/ext x10 reps      Wand B shoulder flex 1# 2 x 5 repos      Table slides Flex x 10 reps                                                                                                           Other Therapeutic Activities:      Home Exercise Program:  N/A    Manual Treatments:  PROM R shoulder x 10 minutes    Modalities:  HP to R shoulder x 10 minutes prior to exercises    Timed Code Treatment Minutes:  30    Total Treatment Minutes:  40    Treatment/Activity Tolerance:  [] Patient tolerated treatment well [] Patient limited by fatigue  [x] Patient limited by pain  [] Patient limited by other medical complications  [] Other:     Prognosis: [] Good [x] Fair  [] Poor    Patient Requires Follow-up: [x] Yes  [] No    Plan:   [x] Continue per plan of care [] Alter current plan (see comments)  [] Plan of care initiated [] Hold pending MD visit [] Discharge  Plan for Next Session:        Electronically signed by:  Angie Gtz, PT [id number]

## 2019-06-04 ENCOUNTER — HOSPITAL ENCOUNTER (OUTPATIENT)
Dept: PHYSICAL THERAPY | Age: 59
Setting detail: THERAPIES SERIES
Discharge: HOME | End: 2019-06-04
Payer: COMMERCIAL

## 2019-06-06 ENCOUNTER — HOSPITAL ENCOUNTER (OUTPATIENT)
Dept: PHYSICAL THERAPY | Age: 59
Setting detail: THERAPIES SERIES
Discharge: HOME | End: 2019-06-06
Payer: COMMERCIAL

## 2019-06-06 PROCEDURE — 97110 THERAPEUTIC EXERCISES: CPT

## 2019-06-06 NOTE — PROGRESS NOTES
[address] [address] [address]      Phone: [phone]  Fax: [phone]    Physical Therapy Daily Treatment Note  Date:  2019    Patient Name:  Carolyn Eckert    :  1960  MRN: [id number]    Restrictions/Precautions:    Diagnosis:  Chronic right shoulder pain  Treatment Diagnosis:    Insurance/Certification information:  [facility]  Referring Physician:  Preston Ballesteros MD  Plan of care signed (Y/N):    Visit# / total visits: 3/12  Pain level: 6/10   Time In:  6023  Time Out:  1033    Subjective:  Pt with decreased pain reported.      Exercises:  Exercise/Equipment Resistance/Repetitions Other comments     Pulleys 5 minutes       Shoulder flex and abd with wand (on floor) x10 reps each      Elbow flex/ext x10 reps      Wand B shoulder flex 1# 2 x 5 repos      Table slides Flex x 10 reps                                                                                                           Other Therapeutic Activities:      Home Exercise Program:  N/A    Manual Treatments:  PROM R shoulder x 10 minutes    Modalities:  HP to R shoulder x 10 minutes prior to exercises    Timed Code Treatment Minutes:  35    Total Treatment Minutes:  51    Treatment/Activity Tolerance:  [] Patient tolerated treatment well [] Patient limited by fatigue  [x] Patient limited by pain  [] Patient limited by other medical complications  [] Other:     Prognosis: [] Good [x] Fair  [] Poor    Patient Requires Follow-up: [x] Yes  [] No    Plan:   [x] Continue per plan of care [] Alter current plan (see comments)  [] Plan of care initiated [] Hold pending MD visit [] Discharge  Plan for Next Session:        Electronically signed by:  Kevyn Dennison, PTA [id number]

## 2019-06-11 ENCOUNTER — HOSPITAL ENCOUNTER (OUTPATIENT)
Dept: PHYSICAL THERAPY | Age: 59
Setting detail: THERAPIES SERIES
Discharge: HOME | End: 2019-06-11
Payer: COMMERCIAL

## 2019-06-11 NOTE — PROGRESS NOTES
[address]      Phone: [phone]  Fax: [phone]    Physical Therapy  Cancellation/No-show Note  Patient Name:  Sukhdev Sullivan  :  1960   Date:  2019    For today's appointment patient:  [x]  Cancelled  []  Rescheduled appointment  []  No-show     Reason given by patient:  []  Patient ill  []  Conflicting appointment  []  No transportation    []  Conflict with work  []  No reason given  []  Other:     Comments:  Pt reported having a bad day w/ increased anxiety , preventing her from leaving the house.    Electronically signed by:  Gabriella Ocampo PTA [id number]

## 2019-06-13 ENCOUNTER — HOSPITAL ENCOUNTER (OUTPATIENT)
Dept: PHYSICAL THERAPY | Age: 59
Setting detail: THERAPIES SERIES
Discharge: HOME | End: 2019-06-13
Payer: COMMERCIAL

## 2019-06-13 NOTE — PROGRESS NOTES
[address]      Phone: [phone]  Fax: [phone]    Physical Therapy  Cancellation/No-show Note  Patient Name:  Candy Aguirre  :  1960   Date:  2019    For today's appointment patient:  [x]  Cancelled  []  Rescheduled appointment  []  No-show     Reason given by patient:  []  Patient ill  []  Conflicting appointment  []  No transportation    []  Conflict with work  []  No reason given  [x]  Other:     Comments:  Just woke up    Electronically signed by:  April Lemon PT [id number]

## 2019-06-18 ENCOUNTER — HOSPITAL ENCOUNTER (OUTPATIENT)
Dept: PHYSICAL THERAPY | Age: 59
Setting detail: THERAPIES SERIES
Discharge: HOME | End: 2019-06-18
Payer: COMMERCIAL
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
## 2019-06-20 ENCOUNTER — HOSPITAL ENCOUNTER (OUTPATIENT)
Dept: PHYSICAL THERAPY | Age: 59
Setting detail: THERAPIES SERIES
Discharge: HOME | End: 2019-06-20
Payer: COMMERCIAL

## 2019-06-20 NOTE — PROGRESS NOTES
[address]      Phone: [phone]  Fax: [phone]    Physical Therapy  Cancellation/No-show Note  Patient Name:  Torres Cordon  :  1960   Date:  2019    For today's appointment patient:  [x]  Cancelled  []  Rescheduled appointment  []  No-show     Reason given by patient:  []  Patient ill  []  Conflicting appointment  []  No transportation    []  Conflict with work  []  No reason given  []  Other:     Comments:  Pt states father had CVA- will call back to reschedule    Electronically signed by:  Vickie MONTES DE OCAT

## 2019-07-19 DIAGNOSIS — E03.9 ACQUIRED HYPOTHYROIDISM: Chronic | ICD-10-CM

## 2019-07-19 RX ORDER — BACLOFEN 5 MG/1
TABLET ORAL
Qty: 60 TABLET | Refills: 2 | Status: SHIPPED | OUTPATIENT
Start: 2019-07-19 | End: 2019-09-12 | Stop reason: SDUPTHER

## 2019-07-19 RX ORDER — LEVOTHYROXINE SODIUM 175 UG/1
175 TABLET ORAL DAILY
Qty: 30 TABLET | Refills: 5 | Status: SHIPPED | OUTPATIENT
Start: 2019-07-19 | End: 2020-01-21 | Stop reason: SDUPTHER

## 2019-07-19 RX ORDER — ATORVASTATIN CALCIUM 20 MG/1
20 TABLET, FILM COATED ORAL NIGHTLY
Qty: 30 TABLET | Refills: 5 | Status: SHIPPED | OUTPATIENT
Start: 2019-07-19 | End: 2020-01-21 | Stop reason: SDUPTHER

## 2019-07-19 RX ORDER — BUSPIRONE HYDROCHLORIDE 7.5 MG/1
7.5 TABLET ORAL 2 TIMES DAILY
Qty: 60 TABLET | Refills: 2 | Status: SHIPPED | OUTPATIENT
Start: 2019-07-19 | End: 2019-10-18 | Stop reason: SDUPTHER

## 2019-07-19 RX ORDER — LISINOPRIL 5 MG/1
TABLET ORAL
Qty: 15 TABLET | Refills: 5 | Status: SHIPPED | OUTPATIENT
Start: 2019-07-19 | End: 2020-01-21 | Stop reason: SDUPTHER

## 2019-07-19 RX ORDER — GABAPENTIN 600 MG/1
600 TABLET ORAL 2 TIMES DAILY
Qty: 60 TABLET | Refills: 2 | Status: SHIPPED | OUTPATIENT
Start: 2019-07-19 | End: 2019-10-11 | Stop reason: SDUPTHER

## 2019-08-14 ENCOUNTER — OFFICE VISIT (OUTPATIENT)
Dept: PRIMARY CARE CLINIC | Age: 59
End: 2019-08-14
Payer: COMMERCIAL

## 2019-08-14 VITALS
DIASTOLIC BLOOD PRESSURE: 78 MMHG | SYSTOLIC BLOOD PRESSURE: 126 MMHG | HEIGHT: 63 IN | BODY MASS INDEX: 51.91 KG/M2 | HEART RATE: 60 BPM | TEMPERATURE: 97.6 F | OXYGEN SATURATION: 97 % | WEIGHT: 293 LBS

## 2019-08-14 DIAGNOSIS — E55.9 VITAMIN D DEFICIENCY: ICD-10-CM

## 2019-08-14 DIAGNOSIS — M25.511 CHRONIC RIGHT SHOULDER PAIN: ICD-10-CM

## 2019-08-14 DIAGNOSIS — M79.89 RIGHT LEG SWELLING: ICD-10-CM

## 2019-08-14 DIAGNOSIS — I89.0 LYMPHEDEMA OF BOTH LOWER EXTREMITIES: ICD-10-CM

## 2019-08-14 DIAGNOSIS — M79.604 PAIN OF RIGHT LOWER EXTREMITY: ICD-10-CM

## 2019-08-14 DIAGNOSIS — I81 PVT (PORTAL VEIN THROMBOSIS): Primary | ICD-10-CM

## 2019-08-14 DIAGNOSIS — F41.9 ANXIETY: ICD-10-CM

## 2019-08-14 DIAGNOSIS — G89.29 CHRONIC RIGHT SHOULDER PAIN: ICD-10-CM

## 2019-08-14 LAB
INTERNATIONAL NORMALIZATION RATIO, POC: 2.6
PROTHROMBIN TIME, POC: NORMAL

## 2019-08-14 PROCEDURE — G8427 DOCREV CUR MEDS BY ELIG CLIN: HCPCS | Performed by: FAMILY MEDICINE

## 2019-08-14 PROCEDURE — 99214 OFFICE O/P EST MOD 30 MIN: CPT | Performed by: FAMILY MEDICINE

## 2019-08-14 PROCEDURE — G8417 CALC BMI ABV UP PARAM F/U: HCPCS | Performed by: FAMILY MEDICINE

## 2019-08-14 PROCEDURE — 85610 PROTHROMBIN TIME: CPT | Performed by: FAMILY MEDICINE

## 2019-08-14 PROCEDURE — 1036F TOBACCO NON-USER: CPT | Performed by: FAMILY MEDICINE

## 2019-08-14 PROCEDURE — 3017F COLORECTAL CA SCREEN DOC REV: CPT | Performed by: FAMILY MEDICINE

## 2019-08-14 RX ORDER — ERGOCALCIFEROL 1.25 MG/1
50000 CAPSULE ORAL WEEKLY
Qty: 12 CAPSULE | Refills: 0 | Status: SHIPPED | OUTPATIENT
Start: 2019-08-14 | End: 2019-11-18 | Stop reason: SDUPTHER

## 2019-08-14 RX ORDER — TRAMADOL HYDROCHLORIDE 50 MG/1
50 TABLET ORAL EVERY 6 HOURS PRN
Qty: 120 TABLET | Refills: 0 | Status: SHIPPED | OUTPATIENT
Start: 2019-08-14 | End: 2019-09-13

## 2019-08-14 RX ORDER — FOLIC ACID 1 MG/1
1 TABLET ORAL DAILY
Qty: 30 TABLET | Refills: 5 | Status: SHIPPED
Start: 2019-08-14 | End: 2020-05-12

## 2019-08-14 RX ORDER — CITALOPRAM 40 MG/1
40 TABLET ORAL DAILY
Qty: 30 TABLET | Refills: 5 | Status: SHIPPED
Start: 2019-08-14 | End: 2020-03-13

## 2019-08-14 RX ORDER — WARFARIN SODIUM 2.5 MG/1
2.5 TABLET ORAL DAILY
Qty: 30 TABLET | Refills: 5 | Status: SHIPPED
Start: 2019-08-14 | End: 2020-05-08 | Stop reason: SDUPTHER

## 2019-08-14 RX ORDER — WARFARIN SODIUM 5 MG/1
TABLET ORAL
Qty: 30 TABLET | Refills: 5 | Status: SHIPPED
Start: 2019-08-14 | End: 2020-05-08 | Stop reason: SDUPTHER

## 2019-08-30 ENCOUNTER — TELEPHONE (OUTPATIENT)
Dept: PRIMARY CARE CLINIC | Age: 59
End: 2019-08-30

## 2019-09-12 RX ORDER — BACLOFEN 10 MG/1
TABLET ORAL
Qty: 30 TABLET | Refills: 0 | Status: SHIPPED | OUTPATIENT
Start: 2019-09-12 | End: 2019-11-18 | Stop reason: SDUPTHER

## 2019-11-17 ENCOUNTER — PATIENT MESSAGE (OUTPATIENT)
Dept: PRIMARY CARE CLINIC | Age: 59
End: 2019-11-17

## 2019-11-17 DIAGNOSIS — I89.0 LYMPHEDEMA OF BOTH LOWER EXTREMITIES: Primary | ICD-10-CM

## 2019-11-18 ENCOUNTER — PATIENT MESSAGE (OUTPATIENT)
Dept: PRIMARY CARE CLINIC | Age: 59
End: 2019-11-18

## 2019-11-18 RX ORDER — BACLOFEN 10 MG/1
TABLET ORAL
Qty: 30 TABLET | Refills: 1 | Status: SHIPPED | OUTPATIENT
Start: 2019-11-18 | End: 2020-01-21

## 2019-11-18 RX ORDER — ERGOCALCIFEROL 1.25 MG/1
50000 CAPSULE ORAL WEEKLY
Qty: 12 CAPSULE | Refills: 0 | Status: SHIPPED | OUTPATIENT
Start: 2019-11-18 | End: 2020-01-21 | Stop reason: SDUPTHER

## 2020-01-13 ENCOUNTER — OFFICE VISIT (OUTPATIENT)
Dept: PRIMARY CARE CLINIC | Age: 60
End: 2020-01-13
Payer: COMMERCIAL

## 2020-01-13 ENCOUNTER — HOSPITAL ENCOUNTER (OUTPATIENT)
Age: 60
Discharge: HOME | End: 2020-01-15
Payer: COMMERCIAL

## 2020-01-13 VITALS
DIASTOLIC BLOOD PRESSURE: 84 MMHG | SYSTOLIC BLOOD PRESSURE: 134 MMHG | TEMPERATURE: 98 F | OXYGEN SATURATION: 99 % | HEART RATE: 79 BPM

## 2020-01-13 LAB
ALBUMIN SERPL-MCNC: 3.8 G/DL (ref 3.5–5.2)
ALP BLD-CCNC: 106 U/L (ref 35–104)
ALT SERPL-CCNC: 10 U/L (ref 0–32)
ANION GAP SERPL CALCULATED.3IONS-SCNC: 19 MMOL/L (ref 7–16)
AST SERPL-CCNC: 19 U/L (ref 0–31)
BILIRUB SERPL-MCNC: 0.4 MG/DL (ref 0–1.2)
BUN BLDV-MCNC: 17 MG/DL (ref 6–20)
CALCIUM SERPL-MCNC: 10.1 MG/DL (ref 8.6–10.2)
CHLORIDE BLD-SCNC: 98 MMOL/L (ref 98–107)
CHOLESTEROL, TOTAL: 105 MG/DL (ref 0–199)
CO2: 23 MMOL/L (ref 22–29)
CREAT SERPL-MCNC: 1.4 MG/DL (ref 0.5–1)
GFR AFRICAN AMERICAN: 47
GFR NON-AFRICAN AMERICAN: 39 ML/MIN/1.73
GLUCOSE BLD-MCNC: 98 MG/DL (ref 74–99)
HDLC SERPL-MCNC: 45 MG/DL
INTERNATIONAL NORMALIZATION RATIO, POC: 2.4
LDL CHOLESTEROL CALCULATED: 39 MG/DL (ref 0–99)
POTASSIUM SERPL-SCNC: 4.5 MMOL/L (ref 3.5–5)
PROTHROMBIN TIME, POC: NORMAL
SODIUM BLD-SCNC: 140 MMOL/L (ref 132–146)
TOTAL PROTEIN: 7.9 G/DL (ref 6.4–8.3)
TRIGL SERPL-MCNC: 103 MG/DL (ref 0–149)
TSH SERPL DL<=0.05 MIU/L-ACNC: 1.03 UIU/ML (ref 0.27–4.2)
VLDLC SERPL CALC-MCNC: 21 MG/DL

## 2020-01-13 PROCEDURE — G8484 FLU IMMUNIZE NO ADMIN: HCPCS | Performed by: FAMILY MEDICINE

## 2020-01-13 PROCEDURE — 84443 ASSAY THYROID STIM HORMONE: CPT

## 2020-01-13 PROCEDURE — 1036F TOBACCO NON-USER: CPT | Performed by: FAMILY MEDICINE

## 2020-01-13 PROCEDURE — 3017F COLORECTAL CA SCREEN DOC REV: CPT | Performed by: FAMILY MEDICINE

## 2020-01-13 PROCEDURE — 80061 LIPID PANEL: CPT

## 2020-01-13 PROCEDURE — 99214 OFFICE O/P EST MOD 30 MIN: CPT | Performed by: FAMILY MEDICINE

## 2020-01-13 PROCEDURE — 85610 PROTHROMBIN TIME: CPT | Performed by: FAMILY MEDICINE

## 2020-01-13 PROCEDURE — 80053 COMPREHEN METABOLIC PANEL: CPT

## 2020-01-13 PROCEDURE — G8417 CALC BMI ABV UP PARAM F/U: HCPCS | Performed by: FAMILY MEDICINE

## 2020-01-13 PROCEDURE — G8427 DOCREV CUR MEDS BY ELIG CLIN: HCPCS | Performed by: FAMILY MEDICINE

## 2020-01-13 ASSESSMENT — ENCOUNTER SYMPTOMS
SHORTNESS OF BREATH: 0
CONSTIPATION: 0
ROS SKIN COMMENTS: NO HAIR LOSS  + DRY SKIN  NO BRITTLE NAILS
BLURRED VISION: 0
DIARRHEA: 0

## 2020-01-13 NOTE — PROGRESS NOTES
warfarin (COUMADIN) 5 MG tablet Indications: Sun - Mon - Wed - Fri TAKE 1 TABLET DAILY AS DIRECTED 30 tablet 5    folic acid (FOLVITE) 1 MG tablet Take 1 tablet by mouth daily 30 tablet 5    citalopram (CELEXA) 40 MG tablet Take 1 tablet by mouth daily 30 tablet 5    warfarin (COUMADIN) 2.5 MG tablet Take 1 tablet by mouth daily Indications: Sat - Tue - Thurs 30 tablet 5    atorvastatin (LIPITOR) 20 MG tablet Take 1 tablet by mouth nightly 30 tablet 5    lisinopril (PRINIVIL;ZESTRIL) 5 MG tablet TAKE 1/2 TABLET BY MOUTH ONCE DAILY 15 tablet 5    levothyroxine (SYNTHROID) 175 MCG tablet Take 1 tablet by mouth Daily 30 tablet 5    Magnesium Cl-Calcium Carbonate (SLOW-MAG PO) Take by mouth nightly      Lift Chair MISC by Does not apply route 1 each 0    acetaminophen (TYLENOL ARTHRITIS PAIN) 650 MG extended release tablet Take 1,300 mg by mouth every 8 hours as needed for Pain      CPAP Machine MISC by Does not apply route      levonorgestrel (MIRENA, 52 MG,) IUD 52 mg 1 each by Intrauterine route once      Misc. Devices (GEL-FOAM CUSHION) MISC For chair 1 each 0    gabapentin (NEURONTIN) 600 MG tablet Take 1 tablet by mouth 2 times dailY 60 tablet 3     No current facility-administered medications on file prior to visit. Review of Systems   Constitutional: Negative for fatigue and malaise/fatigue. Eyes: Negative for blurred vision. Respiratory: Negative for shortness of breath. Cardiovascular: Positive for leg swelling. Negative for chest pain and palpitations. Gastrointestinal: Negative for constipation and diarrhea. Endocrine: Positive for cold intolerance. Negative for heat intolerance, polydipsia and polyuria. Skin:        No hair loss  + dry skin  No brittle nails   Neurological: Negative for headaches. Psychiatric/Behavioral: Negative for dysphoric mood. The patient is not nervous/anxious.     other review of systems reviewed and are negative    OBJECTIVE:  /84   Pulse 79 Temp 98 °F (36.7 °C)   SpO2 99%      Physical Exam  Vitals signs reviewed. Eyes:      General: No scleral icterus. Conjunctiva/sclera: Conjunctivae normal.   Neck:      Musculoskeletal: Neck supple. Thyroid: No thyromegaly. Vascular: No carotid bruit. Cardiovascular:      Rate and Rhythm: Normal rate and regular rhythm. Heart sounds: No murmur. Pulmonary:      Effort: Pulmonary effort is normal.      Breath sounds: Normal breath sounds. No wheezing or rales. Abdominal:      General: Bowel sounds are normal.      Palpations: Abdomen is soft. There is no mass. Tenderness: There is no tenderness. There is no guarding or rebound. Musculoskeletal: Normal range of motion. Lymphadenopathy:      Cervical: No cervical adenopathy. Skin:     General: Skin is warm and dry. Comments: Extremities lower: increase thickening distal RLE with brownish color change. ie hyperkeratosis. calf size 25 in R and 24 L  Ankle size 18 inches R and 17.5 in L    Neurological:      Mental Status: She is alert and oriented to person, place, and time. Psychiatric:         Mood and Affect: Mood normal.         ASSESSMENT AND PLAN:    Amelia Segundo was seen today for hyperlipidemia, hypertension, hypothyroidism, check-up, medication refill and blood work. Diagnoses and all orders for this visit:    Cerebrovascular accident (CVA), unspecified mechanism ([facility] [address]  -     POCT INR  -     Lipid Panel; Future    Essential hypertension  -     Comprehensive Metabolic Panel; Future    Lymphedema of both lower extremities    Acquired hypothyroidism  -     TSH without Reflex; Future    Results for Urszula Treviño (MRN [id number]) as of 1/13/2020 10:25   Ref. Range 1/13/2020 10:14   INR Unknown 2.4     - no change in coumadin dose recheck inr in month. - bp stable continue current med. - will submit note for lymphedema pump.   - ok to hold folic acid.     Return in about 4 months (around 5/13/2020) for or for acute

## 2020-01-16 ENCOUNTER — TELEPHONE (OUTPATIENT)
Dept: PRIMARY CARE CLINIC | Age: 60
End: 2020-01-16

## 2020-01-21 RX ORDER — GABAPENTIN 600 MG/1
600 TABLET ORAL 2 TIMES DAILY
Qty: 60 TABLET | Refills: 2 | Status: SHIPPED
Start: 2020-01-21 | End: 2020-05-08 | Stop reason: SDUPTHER

## 2020-01-21 RX ORDER — ERGOCALCIFEROL 1.25 MG/1
50000 CAPSULE ORAL WEEKLY
Qty: 12 CAPSULE | Refills: 0 | Status: SHIPPED
Start: 2020-01-21 | End: 2020-03-13 | Stop reason: SDUPTHER

## 2020-01-21 RX ORDER — LISINOPRIL 5 MG/1
TABLET ORAL
Qty: 15 TABLET | Refills: 5 | Status: SHIPPED
Start: 2020-01-21 | End: 2020-05-12 | Stop reason: SDUPTHER

## 2020-01-21 RX ORDER — BACLOFEN 10 MG/1
TABLET ORAL
Qty: 30 TABLET | Refills: 1 | Status: SHIPPED
Start: 2020-01-21 | End: 2020-05-08

## 2020-01-21 RX ORDER — LEVOTHYROXINE SODIUM 175 UG/1
175 TABLET ORAL DAILY
Qty: 30 TABLET | Refills: 5 | Status: SHIPPED
Start: 2020-01-21 | End: 2020-05-12 | Stop reason: SDUPTHER

## 2020-01-21 RX ORDER — ATORVASTATIN CALCIUM 20 MG/1
20 TABLET, FILM COATED ORAL NIGHTLY
Qty: 30 TABLET | Refills: 5 | Status: SHIPPED
Start: 2020-01-21 | End: 2020-05-12 | Stop reason: SDUPTHER

## 2020-01-21 RX ORDER — BUSPIRONE HYDROCHLORIDE 15 MG/1
7.5 TABLET ORAL 2 TIMES DAILY
Qty: 30 TABLET | Refills: 2 | Status: SHIPPED
Start: 2020-01-21 | End: 2020-05-08 | Stop reason: SDUPTHER

## 2020-03-13 RX ORDER — BACLOFEN 10 MG/1
TABLET ORAL
Qty: 30 TABLET | Refills: 1 | Status: SHIPPED
Start: 2020-03-13 | End: 2020-05-08 | Stop reason: SDUPTHER

## 2020-03-13 RX ORDER — CITALOPRAM 40 MG/1
40 TABLET ORAL DAILY
Qty: 30 TABLET | Refills: 5 | Status: SHIPPED
Start: 2020-03-13 | End: 2020-06-22

## 2020-03-13 RX ORDER — ERGOCALCIFEROL 1.25 MG/1
50000 CAPSULE ORAL WEEKLY
Qty: 12 CAPSULE | Refills: 0 | Status: SHIPPED
Start: 2020-03-13 | End: 2020-05-12 | Stop reason: SDUPTHER

## 2020-05-12 ENCOUNTER — VIRTUAL VISIT (OUTPATIENT)
Dept: PRIMARY CARE CLINIC | Age: 60
End: 2020-05-12
Payer: COMMERCIAL

## 2020-05-12 PROCEDURE — G8417 CALC BMI ABV UP PARAM F/U: HCPCS | Performed by: FAMILY MEDICINE

## 2020-05-12 PROCEDURE — 1036F TOBACCO NON-USER: CPT | Performed by: FAMILY MEDICINE

## 2020-05-12 PROCEDURE — G8427 DOCREV CUR MEDS BY ELIG CLIN: HCPCS | Performed by: FAMILY MEDICINE

## 2020-05-12 PROCEDURE — 99214 OFFICE O/P EST MOD 30 MIN: CPT | Performed by: FAMILY MEDICINE

## 2020-05-12 PROCEDURE — 3017F COLORECTAL CA SCREEN DOC REV: CPT | Performed by: FAMILY MEDICINE

## 2020-05-12 RX ORDER — LISINOPRIL 5 MG/1
TABLET ORAL
Qty: 45 TABLET | Refills: 1 | Status: SHIPPED
Start: 2020-05-12 | End: 2020-11-20

## 2020-05-12 RX ORDER — BUSPIRONE HYDROCHLORIDE 15 MG/1
7.5 TABLET ORAL 2 TIMES DAILY
Qty: 90 TABLET | Refills: 1 | Status: SHIPPED
Start: 2020-05-12 | End: 2020-08-28

## 2020-05-12 RX ORDER — GABAPENTIN 600 MG/1
600 TABLET ORAL 3 TIMES DAILY
Qty: 270 TABLET | Refills: 1 | Status: SHIPPED
Start: 2020-05-12 | End: 2020-11-20

## 2020-05-12 RX ORDER — LEVOTHYROXINE SODIUM 175 UG/1
175 TABLET ORAL DAILY
Qty: 90 TABLET | Refills: 1 | Status: SHIPPED
Start: 2020-05-12 | End: 2020-08-28

## 2020-05-12 RX ORDER — ATORVASTATIN CALCIUM 20 MG/1
20 TABLET, FILM COATED ORAL NIGHTLY
Qty: 90 TABLET | Refills: 1 | Status: SHIPPED
Start: 2020-05-12 | End: 2020-11-20

## 2020-05-12 RX ORDER — WARFARIN SODIUM 5 MG/1
TABLET ORAL
Qty: 90 TABLET | Refills: 1 | Status: SHIPPED
Start: 2020-05-12 | End: 2020-08-28

## 2020-05-12 RX ORDER — ERGOCALCIFEROL 1.25 MG/1
50000 CAPSULE ORAL WEEKLY
Qty: 12 CAPSULE | Refills: 1 | Status: SHIPPED
Start: 2020-05-12 | End: 2020-11-16

## 2020-05-12 RX ORDER — WARFARIN SODIUM 2.5 MG/1
2.5 TABLET ORAL DAILY
Qty: 90 TABLET | Refills: 1 | Status: SHIPPED
Start: 2020-05-12 | End: 2020-08-28

## 2020-05-12 RX ORDER — BACLOFEN 10 MG/1
TABLET ORAL
Qty: 90 TABLET | Refills: 1 | Status: SHIPPED
Start: 2020-05-12 | End: 2020-08-28

## 2020-05-12 ASSESSMENT — ENCOUNTER SYMPTOMS: SHORTNESS OF BREATH: 0

## 2020-06-22 RX ORDER — CITALOPRAM 40 MG/1
40 TABLET ORAL DAILY
Qty: 90 TABLET | Refills: 1 | Status: SHIPPED
Start: 2020-06-22 | End: 2020-12-17 | Stop reason: SDUPTHER

## 2020-07-07 ENCOUNTER — TELEPHONE (OUTPATIENT)
Dept: PRIMARY CARE CLINIC | Age: 60
End: 2020-07-07

## 2020-07-31 ENCOUNTER — PATIENT MESSAGE (OUTPATIENT)
Dept: PRIMARY CARE CLINIC | Age: 60
End: 2020-07-31

## 2020-08-28 RX ORDER — LEVOTHYROXINE SODIUM 175 UG/1
TABLET ORAL
Qty: 90 TABLET | Refills: 0 | Status: SHIPPED
Start: 2020-08-28 | End: 2020-12-17 | Stop reason: SDUPTHER

## 2020-08-28 RX ORDER — BUSPIRONE HYDROCHLORIDE 15 MG/1
TABLET ORAL
Qty: 90 TABLET | Refills: 0 | Status: SHIPPED
Start: 2020-08-28 | End: 2020-12-17 | Stop reason: SDUPTHER

## 2020-08-28 RX ORDER — BACLOFEN 10 MG/1
TABLET ORAL
Qty: 90 TABLET | Refills: 0 | Status: SHIPPED
Start: 2020-08-28 | End: 2021-02-12

## 2020-08-28 RX ORDER — WARFARIN SODIUM 5 MG/1
TABLET ORAL
Qty: 90 TABLET | Refills: 0 | Status: SHIPPED
Start: 2020-08-28 | End: 2020-12-17 | Stop reason: SDUPTHER

## 2020-08-28 RX ORDER — WARFARIN SODIUM 2.5 MG/1
TABLET ORAL
Qty: 90 TABLET | Refills: 0 | Status: SHIPPED
Start: 2020-08-28 | End: 2020-12-17 | Stop reason: SDUPTHER

## 2020-09-01 ENCOUNTER — TELEPHONE (OUTPATIENT)
Dept: PRIMARY CARE CLINIC | Age: 60
End: 2020-09-01

## 2020-09-01 NOTE — TELEPHONE ENCOUNTER
Pt already has a scooter but in need of a wheel chair also. Insurance company called requesting a letter stating the medical necessity for a jessica/adjustable wheelchair. Please send to FAX [phone].

## 2020-09-10 ENCOUNTER — TELEPHONE (OUTPATIENT)
Dept: PRIMARY CARE CLINIC | Age: 60
End: 2020-09-10

## 2020-09-10 NOTE — TELEPHONE ENCOUNTER
Pt needs rx faxed to [facility] for a jessica-adjustable wheelchair.     [address] [facility]

## 2020-09-15 ENCOUNTER — VIRTUAL VISIT (OUTPATIENT)
Dept: PRIMARY CARE CLINIC | Age: 60
End: 2020-09-15
Payer: COMMERCIAL

## 2020-09-15 PROCEDURE — G8421 BMI NOT CALCULATED: HCPCS | Performed by: FAMILY MEDICINE

## 2020-09-15 PROCEDURE — 1036F TOBACCO NON-USER: CPT | Performed by: FAMILY MEDICINE

## 2020-09-15 PROCEDURE — 99213 OFFICE O/P EST LOW 20 MIN: CPT | Performed by: FAMILY MEDICINE

## 2020-09-15 PROCEDURE — G8427 DOCREV CUR MEDS BY ELIG CLIN: HCPCS | Performed by: FAMILY MEDICINE

## 2020-09-15 PROCEDURE — 3017F COLORECTAL CA SCREEN DOC REV: CPT | Performed by: FAMILY MEDICINE

## 2020-09-15 RX ORDER — LORATADINE 10 MG/1
10 CAPSULE, LIQUID FILLED ORAL DAILY
Qty: 30 CAPSULE | Refills: 2 | Status: SHIPPED
Start: 2020-09-15 | End: 2020-12-17 | Stop reason: SDUPTHER

## 2020-09-15 ASSESSMENT — ENCOUNTER SYMPTOMS
SHORTNESS OF BREATH: 0
RHINORRHEA: 1

## 2020-09-15 NOTE — PROGRESS NOTES
Twin Montero, a female of 64 y. o.did a virtual visit on 9/15/2020. Patient Active Problem List   Diagnosis    OA (osteoarthritis)    Hypothyroidism    LUCAS on CPAP    HTN (hypertension)    PFO with atrial septal aneurysm    Adult BMI >=70 kg/sq m (HCC)    Rt Hemiparesis following cerebrovascular accident (CVA) ([facility] [address]    Neuropathy    Depression    Venous insufficiency of both lower extremities    DARWIN (generalized anxiety disorder)    Varicose veins of left leg with edema    Cellulitis of right leg    Iron deficiency anemia    Postmenopausal vaginal bleeding    Complex endometrial hyperplasia with atypia    Lymphedema of both lower extremities          HPI heartburn: due to pnd. Taking tylenol cold and sinus without relied. taking tums. Post nasal drip: rhin but only slight nasal condition. Lucas: doing well with new machine. Feels refreshed in am.  Tolerating her mask. Darwin: anxiety doing well because she doesn't go anywhere. Lymphedema: doing pump treatment which has helped. hz cva: inr's in range weekly. Needing wheelchair due to right sided weakness. Allergies   Allergen Reactions    Pcn [Penicillins] Shortness Of Breath    Penicillin G Shortness Of Breath       Current Outpatient Medications on File Prior to Visit   Medication Sig Dispense Refill    warfarin (COUMADIN) 2.5 MG tablet TAKE ONE TABLET BYMOUTH EVERY DAY ON SATURDAYS, [address] 90 tablet 0    levothyroxine (SYNTHROID) 175 MCG tablet TAKE ONE TABLET BY MOUTH EVERY DAY 90 tablet 0    warfarin (COUMADIN) 5 MG tablet TAKE ONE TABLET BY MOUTH DAILY ON SUNDAYS, MONDAYS, WEDNESDAYS AND FRIDAYS. 90 tablet 0    baclofen (LIORESAL) 10 MG tablet TAKE 1/2 TABLET BY MOUTH 2 TIMES A DAY 90 tablet 0    busPIRone (BUSPAR) 15 MG tablet TAKE ONE-HALF (1/2) TABLET BY MOUTH TWICE A DAY.  90 tablet 0    citalopram (CELEXA) 40 MG tablet Take 1 tablet by mouth daily 90 tablet 1    vitamin D (ERGOCALCIFEROL) 1.25 MG (33895 UT) CAPS capsule Take 1 capsule by mouth once a week 12 capsule 1    lisinopril (PRINIVIL;ZESTRIL) 5 MG tablet TAKE 1/2 TABLET BY MOUTH ONCE DAILY 45 tablet 1    atorvastatin (LIPITOR) 20 MG tablet Take 1 tablet by mouth nightly 90 tablet 1    gabapentin (NEURONTIN) 600 MG tablet Take 1 tablet by mouth 3 times daily for 180 days. 270 tablet 1    Disposable Gloves (LATEX GLOVES MEDIUM) MISC Use as needed. 100 each 0    Elastic Bandages & Supports (MEDICAL COMPRESSION STOCKINGS) MISC 1 each by Does not apply route daily 1 each 0    Magnesium Cl-Calcium Carbonate (SLOW-MAG PO) Take by mouth nightly      Lift Chair MISC by Does not apply route 1 each 0    acetaminophen (TYLENOL ARTHRITIS PAIN) 650 MG extended release tablet Take 1,300 mg by mouth every 8 hours as needed for Pain      CPAP Machine MISC by Does not apply route      levonorgestrel (MIRENA, 52 MG,) IUD 52 mg 1 each by Intrauterine route once      Misc. Devices (GEL-FOAM CUSHION) MISC For chair 1 each 0     No current facility-administered medications on file prior to visit. Review of Systems   Constitutional: Negative for chills and fever. HENT: Positive for postnasal drip and rhinorrhea. Negative for congestion. Respiratory: Negative for shortness of breath. Cardiovascular: Positive for leg swelling. Negative for chest pain. Musculoskeletal: Positive for gait problem (due to obesity). Psychiatric/Behavioral: Negative for dysphoric mood. The patient is not nervous/anxious. other review of systems reviewed and are negative    OBJECTIVE:  There were no vitals taken for this visit. Physical Exam  Constitutional:       General: She is not in acute distress. Appearance: Normal appearance. She is not ill-appearing, toxic-appearing or diaphoretic. HENT:      Head: Normocephalic. Eyes:      General: No scleral icterus.   Pulmonary:      Effort: Pulmonary effort is normal.   Neurological:      Mental Status: She is alert and oriented to person, place, and time. Psychiatric:         Mood and Affect: Mood normal.         ASSESSMENT AND PLAN:    Shereen Cramer was seen today for other and results. Diagnoses and all orders for this visit:    FLAVIO (generalized anxiety disorder)    Lymphedema of both lower extremities    Post-nasal drip  -     loratadine (CLARITIN) 10 MG capsule; Take 1 capsule by mouth daily    Rt Hemiparesis following cerebrovascular accident (CVA) ([facility] [address]    - continue coumadin at current dosing and monitoring  - encourage exercise and wt loss. Return in about 3 months (around 12/15/2020), or if symptoms worsen or fail to improve. Leo Millan, DO    TeleMedicine Patient Consent    This visit was performed as a virtual video visit using a synchronous, two-way, audio-video telehealth technology platform. Patient identification was verified at the start of the visit, including the patient's telephone number and physical location. I discussed with the patient the nature of our telehealth visits, that:     1. Due to the nature of an audio- video modality, the only components of a physical exam that could be done are the elements supported by direct observation. 2. I would evaluate the patient and recommend diagnostics and treatments based on my assessment. 3. If it was felt that the patient should be evaluated in clinic or an emergency room setting, then they would be directed there. 4. Our sessions are not being recorded and that personal health information is protected. 5. Our team would provide follow up care in person if/when the patient needs it. Patient does agree to proceed with telemedicine consultation. Patient's location: home address in [facility]. Physician  location [facility]. other people involved in call none. Time spent: Not billed by time    This visit was completed virtually using D[employer]. me

## 2020-10-20 LAB — INR BLD: 2.7

## 2020-10-27 ENCOUNTER — ANTI-COAG VISIT (OUTPATIENT)
Dept: PRIMARY CARE CLINIC | Age: 60
End: 2020-10-27

## 2020-11-06 ENCOUNTER — VIRTUAL VISIT (OUTPATIENT)
Dept: PRIMARY CARE CLINIC | Age: 60
End: 2020-11-06
Payer: COMMERCIAL

## 2020-11-06 ENCOUNTER — TELEPHONE (OUTPATIENT)
Dept: PRIMARY CARE CLINIC | Age: 60
End: 2020-11-06

## 2020-11-06 PROCEDURE — 1036F TOBACCO NON-USER: CPT | Performed by: FAMILY MEDICINE

## 2020-11-06 PROCEDURE — G8428 CUR MEDS NOT DOCUMENT: HCPCS | Performed by: FAMILY MEDICINE

## 2020-11-06 PROCEDURE — 3017F COLORECTAL CA SCREEN DOC REV: CPT | Performed by: FAMILY MEDICINE

## 2020-11-06 PROCEDURE — G8484 FLU IMMUNIZE NO ADMIN: HCPCS | Performed by: FAMILY MEDICINE

## 2020-11-06 PROCEDURE — G8421 BMI NOT CALCULATED: HCPCS | Performed by: FAMILY MEDICINE

## 2020-11-06 PROCEDURE — 99213 OFFICE O/P EST LOW 20 MIN: CPT | Performed by: FAMILY MEDICINE

## 2020-11-06 RX ORDER — PETROLATUM 42 G/100G
OINTMENT TOPICAL
Qty: 228 G | Refills: 0 | Status: SHIPPED | OUTPATIENT
Start: 2020-11-06

## 2020-11-06 NOTE — TELEPHONE ENCOUNTER
Patient called said she has open wound behind right knee and under boobs her skin she said is thin and ripping it does have pus discharge and stinking she is putting a zinc cream on it and not helping can you send something in to help her please?

## 2020-11-06 NOTE — PROGRESS NOTES
Dayanna Fisher, a female of 64 y. o.did a virtual visit on 11/6/2020. Patient Active Problem List   Diagnosis    OA (osteoarthritis)    Hypothyroidism    JOSE on CPAP    HTN (hypertension)    PFO with atrial septal aneurysm    Adult BMI >=70 kg/sq m (HCC)    Rt Hemiparesis following cerebrovascular accident (CVA) ([facility] [address]    Neuropathy    Depression    Venous insufficiency of both lower extremities    FLAVIO (generalized anxiety disorder)    Varicose veins of left leg with edema    Cellulitis of right leg    Iron deficiency anemia    Postmenopausal vaginal bleeding    Complex endometrial hyperplasia with atypia    Lymphedema of both lower extremities          HPI skin: red destiney on right elbow and occas below breasts and under skin of her gut and behind the right knee. Area is seeping a clear fluid that smells. Putting Zinc oxide on area. Allergies   Allergen Reactions    Pcn [Penicillins] Shortness Of Breath    Penicillin G Shortness Of Breath       Current Outpatient Medications on File Prior to Visit   Medication Sig Dispense Refill    loratadine (CLARITIN) 10 MG capsule Take 1 capsule by mouth daily 30 capsule 2    warfarin (COUMADIN) 2.5 MG tablet TAKE ONE TABLET BYMOUTH EVERY DAY ON SATURDAYS, [address] 90 tablet 0    levothyroxine (SYNTHROID) 175 MCG tablet TAKE ONE TABLET BY MOUTH EVERY DAY 90 tablet 0    warfarin (COUMADIN) 5 MG tablet TAKE ONE TABLET BY MOUTH DAILY ON SUNDAYS, MONDAYS, WEDNESDAYS AND FRIDAYS. 90 tablet 0    baclofen (LIORESAL) 10 MG tablet TAKE 1/2 TABLET BY MOUTH 2 TIMES A DAY 90 tablet 0    busPIRone (BUSPAR) 15 MG tablet TAKE ONE-HALF (1/2) TABLET BY MOUTH TWICE A DAY.  90 tablet 0    citalopram (CELEXA) 40 MG tablet Take 1 tablet by mouth daily 90 tablet 1    vitamin D (ERGOCALCIFEROL) 1.25 MG (63331 UT) CAPS capsule Take 1 capsule by mouth once a week 12 capsule 1    lisinopril (PRINIVIL;ZESTRIL) 5 MG tablet TAKE 1/2 TABLET BY MOUTH ONCE DAILY 45 tablet 1    atorvastatin (LIPITOR) 20 MG tablet Take 1 tablet by mouth nightly 90 tablet 1    gabapentin (NEURONTIN) 600 MG tablet Take 1 tablet by mouth 3 times daily for 180 days. 270 tablet 1    Disposable Gloves (LATEX GLOVES MEDIUM) MISC Use as needed. 100 each 0    Elastic Bandages & Supports (MEDICAL COMPRESSION STOCKINGS) MISC 1 each by Does not apply route daily 1 each 0    Magnesium Cl-Calcium Carbonate (SLOW-MAG PO) Take by mouth nightly      Lift Chair MISC by Does not apply route 1 each 0    acetaminophen (TYLENOL ARTHRITIS PAIN) 650 MG extended release tablet Take 1,300 mg by mouth every 8 hours as needed for Pain      CPAP Machine MISC by Does not apply route      levonorgestrel (MIRENA, 52 MG,) IUD 52 mg 1 each by Intrauterine route once      Misc. Devices (GEL-FOAM CUSHION) MISC For chair 1 each 0     No current facility-administered medications on file prior to visit. Review of Systems   Constitutional: Negative for chills and fever. Skin: Positive for rash. other review of systems reviewed and are negative    OBJECTIVE:  There were no vitals taken for this visit. Physical Exam  Skin:     Findings: Erythema and rash present. ASSESSMENT AND PLAN:    Jose Reyes was seen today for wound infection. Diagnoses and all orders for this visit:    Impetigo  -     mupirocin (BACTROBAN) 2 % ointment; Apply topically 3 times daily. Other orders  -     mineral oil-hydrophilic petrolatum (HYDROPHOR) ointment; Apply topically as needed. - put Aquaphor on areas that are beginning to turn red. Return if symptoms worsen or fail to improve. John Medrano DO    TeleMedicine Patient Consent    This visit was performed as a virtual video visit using a synchronous, two-way, audio-video telehealth technology platform. Patient identification was verified at the start of the visit, including the patient's telephone number and physical location.  I discussed with

## 2020-11-16 RX ORDER — ERGOCALCIFEROL 1.25 MG/1
CAPSULE ORAL
Qty: 12 CAPSULE | Refills: 0 | Status: SHIPPED
Start: 2020-11-16 | End: 2020-12-17 | Stop reason: SDUPTHER

## 2020-11-17 LAB — INR BLD: 3.8

## 2020-11-19 ENCOUNTER — ANTI-COAG VISIT (OUTPATIENT)
Dept: PRIMARY CARE CLINIC | Age: 60
End: 2020-11-19

## 2020-11-20 RX ORDER — LISINOPRIL 5 MG/1
TABLET ORAL
Qty: 45 TABLET | Refills: 0 | Status: SHIPPED
Start: 2020-11-20 | End: 2020-12-17 | Stop reason: SDUPTHER

## 2020-11-20 RX ORDER — ATORVASTATIN CALCIUM 20 MG/1
TABLET, FILM COATED ORAL
Qty: 90 TABLET | Refills: 0 | Status: SHIPPED
Start: 2020-11-20 | End: 2020-12-17 | Stop reason: SDUPTHER

## 2020-11-20 RX ORDER — GABAPENTIN 600 MG/1
TABLET ORAL
Qty: 270 TABLET | Refills: 0 | Status: SHIPPED
Start: 2020-11-20 | End: 2020-12-17 | Stop reason: SDUPTHER

## 2020-12-17 ENCOUNTER — VIRTUAL VISIT (OUTPATIENT)
Dept: PRIMARY CARE CLINIC | Age: 60
End: 2020-12-17
Payer: COMMERCIAL

## 2020-12-17 PROCEDURE — G8421 BMI NOT CALCULATED: HCPCS | Performed by: FAMILY MEDICINE

## 2020-12-17 PROCEDURE — G8427 DOCREV CUR MEDS BY ELIG CLIN: HCPCS | Performed by: FAMILY MEDICINE

## 2020-12-17 PROCEDURE — 99214 OFFICE O/P EST MOD 30 MIN: CPT | Performed by: FAMILY MEDICINE

## 2020-12-17 PROCEDURE — 1036F TOBACCO NON-USER: CPT | Performed by: FAMILY MEDICINE

## 2020-12-17 PROCEDURE — G8484 FLU IMMUNIZE NO ADMIN: HCPCS | Performed by: FAMILY MEDICINE

## 2020-12-17 PROCEDURE — 3017F COLORECTAL CA SCREEN DOC REV: CPT | Performed by: FAMILY MEDICINE

## 2020-12-17 RX ORDER — GABAPENTIN 600 MG/1
600 TABLET ORAL 3 TIMES DAILY
Qty: 270 TABLET | Refills: 1 | Status: SHIPPED
Start: 2020-12-17 | End: 2021-02-15 | Stop reason: SDUPTHER

## 2020-12-17 RX ORDER — CITALOPRAM 40 MG/1
40 TABLET ORAL DAILY
Qty: 90 TABLET | Refills: 1 | Status: SHIPPED
Start: 2020-12-17 | End: 2021-02-15 | Stop reason: SDUPTHER

## 2020-12-17 RX ORDER — LORATADINE 10 MG/1
10 CAPSULE, LIQUID FILLED ORAL DAILY
Qty: 30 CAPSULE | Refills: 2 | Status: SHIPPED
Start: 2020-12-17 | End: 2021-02-15 | Stop reason: SDUPTHER

## 2020-12-17 RX ORDER — BUSPIRONE HYDROCHLORIDE 15 MG/1
15 TABLET ORAL 2 TIMES DAILY
Qty: 90 TABLET | Refills: 0 | Status: SHIPPED
Start: 2020-12-17 | End: 2021-02-15 | Stop reason: SDUPTHER

## 2020-12-17 RX ORDER — LISINOPRIL 5 MG/1
TABLET ORAL
Qty: 90 TABLET | Refills: 1 | Status: SHIPPED
Start: 2020-12-17 | End: 2021-02-15 | Stop reason: SDUPTHER

## 2020-12-17 RX ORDER — ATORVASTATIN CALCIUM 20 MG/1
20 TABLET, FILM COATED ORAL DAILY
Qty: 90 TABLET | Refills: 0 | Status: SHIPPED
Start: 2020-12-17 | End: 2021-02-15 | Stop reason: SDUPTHER

## 2020-12-17 RX ORDER — LISINOPRIL 5 MG/1
TABLET ORAL
Qty: 45 TABLET | Refills: 0 | Status: SHIPPED
Start: 2020-12-17 | End: 2020-12-17 | Stop reason: DRUGHIGH

## 2020-12-17 RX ORDER — ERGOCALCIFEROL 1.25 MG/1
50000 CAPSULE ORAL WEEKLY
Qty: 12 CAPSULE | Refills: 0 | Status: SHIPPED
Start: 2020-12-17 | End: 2021-02-15 | Stop reason: SDUPTHER

## 2020-12-17 RX ORDER — LEVOTHYROXINE SODIUM 175 UG/1
175 TABLET ORAL DAILY
Qty: 90 TABLET | Refills: 0 | Status: SHIPPED
Start: 2020-12-17 | End: 2021-02-15 | Stop reason: SDUPTHER

## 2020-12-17 RX ORDER — TRAMADOL HYDROCHLORIDE 50 MG/1
50 TABLET ORAL EVERY 8 HOURS PRN
Qty: 30 TABLET | Refills: 0 | Status: SHIPPED | OUTPATIENT
Start: 2020-12-17 | End: 2021-03-16

## 2020-12-17 RX ORDER — WARFARIN SODIUM 5 MG/1
TABLET ORAL
Qty: 90 TABLET | Refills: 0 | Status: SHIPPED
Start: 2020-12-17 | End: 2021-02-15 | Stop reason: SDUPTHER

## 2020-12-17 RX ORDER — WARFARIN SODIUM 2.5 MG/1
TABLET ORAL
Qty: 90 TABLET | Refills: 0 | Status: SHIPPED
Start: 2020-12-17 | End: 2021-02-15 | Stop reason: SDUPTHER

## 2020-12-17 ASSESSMENT — ENCOUNTER SYMPTOMS
CONSTIPATION: 0
DIARRHEA: 0
ROS SKIN COMMENTS: NO HAIR LOSS  NO DRY SKIN  NO BRITTLE NAILS
SHORTNESS OF BREATH: 0

## 2020-12-17 NOTE — PROGRESS NOTES
Lesley Loya, a female of 64 y. o.did a virtual visit on 12/17/2020. Patient Active Problem List   Diagnosis    OA (osteoarthritis)    Hypothyroidism    JOSE on CPAP    HTN (hypertension)    PFO with atrial septal aneurysm    Adult BMI >=70 kg/sq m (HCC)    Rt Hemiparesis following cerebrovascular accident (CVA) ([facility] [address]    Neuropathy    Depression    Venous insufficiency of both lower extremities    FLAVIO (generalized anxiety disorder)    Varicose veins of left leg with edema    Cellulitis of right leg    Iron deficiency anemia    Postmenopausal vaginal bleeding    Complex endometrial hyperplasia with atypia    Lymphedema of both lower extremities          Hypertension  This is a chronic problem. The current episode started more than 1 year ago. Pertinent negatives include no chest pain, headaches, malaise/fatigue, palpitations, peripheral edema or shortness of breath. There are no compliance problems. hypothyroidism: doing well on thyroid med. Energy ok. R shoulder pain: no I/T. Tylenol without relief. Tramadol helped in past but she is out of them. CVA: doing well on coumadin inr 2.4 the other day. Still with RUE weakness. FLAVIO: doing well with Celexa. Allergies   Allergen Reactions    Pcn [Penicillins] Shortness Of Breath    Penicillin G Shortness Of Breath       Current Outpatient Medications on File Prior to Visit   Medication Sig Dispense Refill    mupirocin (BACTROBAN) 2 % ointment Apply topically 3 times daily. 22 g 2    mineral oil-hydrophilic petrolatum (HYDROPHOR) ointment Apply topically as needed. 228 g 0    baclofen (LIORESAL) 10 MG tablet TAKE 1/2 TABLET BY MOUTH 2 TIMES A DAY 90 tablet 0    Disposable Gloves (LATEX GLOVES MEDIUM) MISC Use as needed.  100 each 0    Elastic Bandages & Supports (MEDICAL COMPRESSION STOCKINGS) MISC 1 each by Does not apply route daily 1 each 0    Magnesium Cl-Calcium Carbonate (SLOW-MAG PO) Take by mouth nightly  Lift Chair MISC by Does not apply route 1 each 0    acetaminophen (TYLENOL ARTHRITIS PAIN) 650 MG extended release tablet Take 1,300 mg by mouth every 8 hours as needed for Pain      CPAP Machine MISC by Does not apply route      levonorgestrel (MIRENA, 52 MG,) IUD 52 mg 1 each by Intrauterine route once      Misc. Devices (GEL-FOAM CUSHION) MISC For chair 1 each 0     No current facility-administered medications on file prior to visit. Review of Systems   Constitutional: Negative for fatigue and malaise/fatigue. Respiratory: Negative for shortness of breath. Cardiovascular: Positive for leg swelling (but better. ). Negative for chest pain and palpitations. Gastrointestinal: Negative for constipation and diarrhea. Endocrine: Negative for cold intolerance, heat intolerance, polydipsia and polyuria. Skin:        No hair loss  No dry skin  No brittle nails   Neurological: Positive for weakness (R arm still). Negative for tremors and headaches. Psychiatric/Behavioral: Negative for dysphoric mood and sleep disturbance. The patient is not nervous/anxious. other review of systems reviewed and are negative    OBJECTIVE:  There were no vitals taken for this visit. Physical Exam  Constitutional:       General: She is not in acute distress. Appearance: Normal appearance. She is not ill-appearing, toxic-appearing or diaphoretic. HENT:      Head: Normocephalic. Eyes:      General: No scleral icterus. Pulmonary:      Effort: Pulmonary effort is normal.   Neurological:      Mental Status: She is alert and oriented to person, place, and time. Psychiatric:         Mood and Affect: Mood normal.         ASSESSMENT AND PLAN:    Silva Lopez was seen today for hypertension, hypothyroidism, blood work and 6 month follow-up. Diagnoses and all orders for this visit:    Essential hypertension  -     Discontinue: lisinopril (PRINIVIL;ZESTRIL) 5 MG tablet;  Take half tablet daily 1. Due to the nature of an audio- video modality, the only components of a physical exam that could be done are the elements supported by direct observation. 2. I would evaluate the patient and recommend diagnostics and treatments based on my assessment. 3. If it was felt that the patient should be evaluated in clinic or an emergency room setting, then they would be directed there. 4. Our sessions are not being recorded and that personal health information is protected. 5. Our team would provide follow up care in person if/when the patient needs it. Patient does agree to proceed with telemedicine consultation. Patient's location: home address in [facility]. Physician  location [facility]. other people involved in call none. Time spent: Not billed by time    This visit was completed virtually using D[employer]. me

## 2021-02-12 DIAGNOSIS — I69.359 HEMIPARESIS FOLLOWING CEREBROVASCULAR ACCIDENT (CVA) (HCC): Chronic | ICD-10-CM

## 2021-02-12 RX ORDER — BACLOFEN 10 MG/1
TABLET ORAL
Qty: 90 TABLET | Refills: 0 | Status: SHIPPED
Start: 2021-02-12 | End: 2021-02-15 | Stop reason: SDUPTHER

## 2021-02-15 DIAGNOSIS — I69.359 HEMIPARESIS FOLLOWING CEREBROVASCULAR ACCIDENT (CVA) (HCC): Chronic | ICD-10-CM

## 2021-02-15 DIAGNOSIS — R20.0 NUMBNESS AND TINGLING OF RIGHT ARM AND LEG: ICD-10-CM

## 2021-02-15 DIAGNOSIS — R20.2 NUMBNESS AND TINGLING OF RIGHT ARM AND LEG: ICD-10-CM

## 2021-02-15 DIAGNOSIS — I10 ESSENTIAL HYPERTENSION: ICD-10-CM

## 2021-02-15 DIAGNOSIS — E03.9 ACQUIRED HYPOTHYROIDISM: Chronic | ICD-10-CM

## 2021-02-15 DIAGNOSIS — E78.5 HYPERLIPIDEMIA, UNSPECIFIED HYPERLIPIDEMIA TYPE: ICD-10-CM

## 2021-02-15 DIAGNOSIS — F41.9 ANXIETY: ICD-10-CM

## 2021-02-15 RX ORDER — LISINOPRIL 5 MG/1
TABLET ORAL
Qty: 90 TABLET | Refills: 0 | Status: ON HOLD
Start: 2021-02-15 | End: 2021-06-04 | Stop reason: HOSPADM

## 2021-02-15 RX ORDER — BUSPIRONE HYDROCHLORIDE 15 MG/1
15 TABLET ORAL 2 TIMES DAILY
Qty: 90 TABLET | Refills: 0 | Status: SHIPPED
Start: 2021-02-15 | End: 2021-06-15 | Stop reason: SDUPTHER

## 2021-02-15 RX ORDER — GABAPENTIN 600 MG/1
600 TABLET ORAL 3 TIMES DAILY
Qty: 270 TABLET | Refills: 0 | Status: SHIPPED
Start: 2021-02-15 | End: 2021-06-15 | Stop reason: SDUPTHER

## 2021-02-15 RX ORDER — CITALOPRAM 40 MG/1
40 TABLET ORAL DAILY
Qty: 90 TABLET | Refills: 0 | Status: SHIPPED
Start: 2021-02-15 | End: 2021-06-15 | Stop reason: SDUPTHER

## 2021-02-15 RX ORDER — LEVOTHYROXINE SODIUM 175 UG/1
175 TABLET ORAL DAILY
Qty: 90 TABLET | Refills: 0 | Status: ON HOLD
Start: 2021-02-15 | End: 2021-06-09 | Stop reason: HOSPADM

## 2021-02-15 RX ORDER — WARFARIN SODIUM 2.5 MG/1
TABLET ORAL
Qty: 90 TABLET | Refills: 0 | Status: ON HOLD
Start: 2021-02-15 | End: 2021-06-04 | Stop reason: SDUPTHER

## 2021-02-15 RX ORDER — ERGOCALCIFEROL 1.25 MG/1
50000 CAPSULE ORAL WEEKLY
Qty: 12 CAPSULE | Refills: 0 | Status: SHIPPED
Start: 2021-02-15 | End: 2021-05-07

## 2021-02-15 RX ORDER — WARFARIN SODIUM 5 MG/1
TABLET ORAL
Qty: 90 TABLET | Refills: 0 | Status: ON HOLD
Start: 2021-02-15 | End: 2021-06-04 | Stop reason: HOSPADM

## 2021-02-15 RX ORDER — LORATADINE 10 MG/1
10 CAPSULE, LIQUID FILLED ORAL DAILY
Qty: 90 CAPSULE | Refills: 0 | Status: SHIPPED
Start: 2021-02-15 | End: 2021-06-15 | Stop reason: SDUPTHER

## 2021-02-15 RX ORDER — ATORVASTATIN CALCIUM 20 MG/1
20 TABLET, FILM COATED ORAL DAILY
Qty: 90 TABLET | Refills: 0 | Status: SHIPPED
Start: 2021-02-15 | End: 2021-06-15 | Stop reason: SDUPTHER

## 2021-02-15 RX ORDER — BACLOFEN 10 MG/1
TABLET ORAL
Qty: 90 TABLET | Refills: 0 | Status: SHIPPED
Start: 2021-02-15 | End: 2021-06-15 | Stop reason: SDUPTHER

## 2021-02-26 ENCOUNTER — VIRTUAL VISIT (OUTPATIENT)
Dept: PRIMARY CARE CLINIC | Age: 61
End: 2021-02-26
Payer: COMMERCIAL

## 2021-02-26 DIAGNOSIS — R20.0 NUMBNESS AND TINGLING OF RIGHT ARM AND LEG: ICD-10-CM

## 2021-02-26 DIAGNOSIS — G56.02 CARPAL TUNNEL SYNDROME OF LEFT WRIST: Primary | ICD-10-CM

## 2021-02-26 DIAGNOSIS — R20.2 NUMBNESS AND TINGLING OF RIGHT ARM AND LEG: ICD-10-CM

## 2021-02-26 DIAGNOSIS — F41.1 GAD (GENERALIZED ANXIETY DISORDER): ICD-10-CM

## 2021-02-26 PROCEDURE — 99213 OFFICE O/P EST LOW 20 MIN: CPT | Performed by: FAMILY MEDICINE

## 2021-02-26 PROCEDURE — G8427 DOCREV CUR MEDS BY ELIG CLIN: HCPCS | Performed by: FAMILY MEDICINE

## 2021-02-26 PROCEDURE — G8484 FLU IMMUNIZE NO ADMIN: HCPCS | Performed by: FAMILY MEDICINE

## 2021-02-26 PROCEDURE — 1036F TOBACCO NON-USER: CPT | Performed by: FAMILY MEDICINE

## 2021-02-26 PROCEDURE — 3017F COLORECTAL CA SCREEN DOC REV: CPT | Performed by: FAMILY MEDICINE

## 2021-02-26 PROCEDURE — G8421 BMI NOT CALCULATED: HCPCS | Performed by: FAMILY MEDICINE

## 2021-02-26 RX ORDER — GABAPENTIN 100 MG/1
100 CAPSULE ORAL DAILY
COMMUNITY
End: 2021-02-26

## 2021-02-26 NOTE — PROGRESS NOTES
Denisekenisha Garcia, a female of 64 y. o.did a virtual visit on 2/26/2021. Patient Active Problem List   Diagnosis    OA (osteoarthritis)    Hypothyroidism    JOSE on CPAP    HTN (hypertension)    PFO with atrial septal aneurysm    Adult BMI >=70 kg/sq m (HCC)    Rt Hemiparesis following [facility]vascular accident ([facility]A) [facility] [address]    Neuropathy    Depression    Venous insufficiency of both lower extremities    FLAVIO (generalized anxiety disorder)    Varicose veins of left leg with edema    Cellulitis of right leg    Iron deficiency anemia    Postmenopausal vaginal bleeding    Complex endometrial hyperplasia with atypia    Lymphedema of both lower extremities          HPI hand numbness: left thumb, index, and middle finger numb in am. Works on computer a lot and and hs sleeps with hand flexed inward holding her cat. Stress: worrying about family members with there medical issues. Her aide at the house is rude to her. Htn: bp 136/82 last pm.     Neuropathy: worsening tingling in R arm and leg. On Gabapentin 600 mg bid and 300 mg in afternoon which was helping. Allergies   Allergen Reactions    Pcn [Penicillins] Shortness Of Breath    Penicillin G Shortness Of Breath       Current Outpatient Medications on File Prior to Visit   Medication Sig Dispense Refill    atorvastatin (LIPITOR) 20 MG tablet Take 1 tablet by mouth daily 90 tablet 0    warfarin (COUMADIN) 5 MG tablet TAKE ONE TABLET BY MOUTH DAILY ON SUNDAYS, MONDAYS, WEDNESDAYS AND FRIDAYS.  90 tablet 0    warfarin (COUMADIN) 2.5 MG tablet On Tuesdays, Thursday, and Saturdays 90 tablet 0    loratadine (CLARITIN) 10 MG capsule Take 1 capsule by mouth daily 90 capsule 0    busPIRone (BUSPAR) 15 MG tablet Take 15 mg by mouth 2 times daily 90 tablet 0    levothyroxine (SYNTHROID) 175 MCG tablet Take 1 tablet by mouth Daily 90 tablet 0    citalopram (CELEXA) 40 MG tablet Take 1 tablet by mouth daily 90 tablet 0 Appearance: Normal appearance. She is not ill-appearing, toxic-appearing or diaphoretic. HENT:      Head: Normocephalic. Eyes:      General: No scleral icterus. Pulmonary:      Effort: Pulmonary effort is normal.   Neurological:      Mental Status: She is alert and oriented to person, place, and time. Psychiatric:         Mood and Affect: Mood normal.         ASSESSMENT AND PLAN:    Edna Gary was seen today for numbness and discuss medications. Diagnoses and all orders for this visit:    Carpal tunnel syndrome of left wrist    FLAVIO (generalized anxiety disorder)    Numbness and tingling of right arm and leg    - don't sleep hs with wrist flexed and if no change will order brace for hs.   - continue gabapentin but increase to 600 mg tid. Return for as scheduled in April . Cas Kay, DO    TeleMedicine Patient Consent    This visit was performed as a virtual video visit using a synchronous, two-way, audio-video telehealth technology platform. Patient identification was verified at the start of the visit, including the patient's telephone number and physical location. I discussed with the patient the nature of our telehealth visits, that:     1. Due to the nature of an audio- video modality, the only components of a physical exam that could be done are the elements supported by direct observation. 2. I would evaluate the patient and recommend diagnostics and treatments based on my assessment. 3. If it was felt that the patient should be evaluated in clinic or an emergency room setting, then they would be directed there. 4. Our sessions are not being recorded and that personal health information is protected. 5. Our team would provide follow up care in person if/when the patient needs it. Patient does agree to proceed with telemedicine consultation. Patient's location: home address in [place]. Physician  location [facility]. other people involved in call none. Time spent: Not billed by time    This visit was completed virtually using Doxy. me

## 2021-04-06 ENCOUNTER — ANTI-COAG VISIT (OUTPATIENT)
Dept: PRIMARY CARE CLINIC | Age: 61
End: 2021-04-06

## 2021-04-06 ENCOUNTER — OFFICE VISIT (OUTPATIENT)
Dept: PRIMARY CARE CLINIC | Age: 61
End: 2021-04-06
Payer: COMMERCIAL

## 2021-04-06 VITALS
TEMPERATURE: 97.2 F | SYSTOLIC BLOOD PRESSURE: 128 MMHG | WEIGHT: 293 LBS | OXYGEN SATURATION: 95 % | BODY MASS INDEX: 51.91 KG/M2 | DIASTOLIC BLOOD PRESSURE: 80 MMHG | HEART RATE: 69 BPM | HEIGHT: 63 IN

## 2021-04-06 DIAGNOSIS — E03.9 ACQUIRED HYPOTHYROIDISM: Chronic | ICD-10-CM

## 2021-04-06 DIAGNOSIS — I10 ESSENTIAL HYPERTENSION: Primary | Chronic | ICD-10-CM

## 2021-04-06 DIAGNOSIS — I10 ESSENTIAL HYPERTENSION: Chronic | ICD-10-CM

## 2021-04-06 DIAGNOSIS — G89.29 CHRONIC RIGHT SHOULDER PAIN: ICD-10-CM

## 2021-04-06 DIAGNOSIS — I69.359 HEMIPARESIS FOLLOWING CEREBROVASCULAR ACCIDENT (CVA) (HCC): Chronic | ICD-10-CM

## 2021-04-06 DIAGNOSIS — M25.511 CHRONIC RIGHT SHOULDER PAIN: ICD-10-CM

## 2021-04-06 LAB
ALBUMIN SERPL-MCNC: 3.8 G/DL (ref 3.5–5.2)
ALP BLD-CCNC: 110 U/L (ref 35–104)
ALT SERPL-CCNC: 16 U/L (ref 0–32)
ANION GAP SERPL CALCULATED.3IONS-SCNC: 12 MMOL/L (ref 7–16)
AST SERPL-CCNC: 22 U/L (ref 0–31)
BILIRUB SERPL-MCNC: 0.3 MG/DL (ref 0–1.2)
BUN BLDV-MCNC: 16 MG/DL (ref 6–20)
CALCIUM SERPL-MCNC: 9.4 MG/DL (ref 8.6–10.2)
CHLORIDE BLD-SCNC: 101 MMOL/L (ref 98–107)
CHOLESTEROL, TOTAL: 109 MG/DL (ref 0–199)
CO2: 27 MMOL/L (ref 22–29)
CREAT SERPL-MCNC: 1.5 MG/DL (ref 0.5–1)
GFR AFRICAN AMERICAN: 43
GFR NON-AFRICAN AMERICAN: 36 ML/MIN/1.73
GLUCOSE BLD-MCNC: 95 MG/DL (ref 74–99)
HCT VFR BLD CALC: 43.4 % (ref 34–48)
HDLC SERPL-MCNC: 44 MG/DL
HEMOGLOBIN: 13.3 G/DL (ref 11.5–15.5)
INR BLD: 2.7
LDL CHOLESTEROL CALCULATED: 46 MG/DL (ref 0–99)
MCH RBC QN AUTO: 26.5 PG (ref 26–35)
MCHC RBC AUTO-ENTMCNC: 30.6 % (ref 32–34.5)
MCV RBC AUTO: 86.6 FL (ref 80–99.9)
PDW BLD-RTO: 15.5 FL (ref 11.5–15)
PLATELET # BLD: 246 E9/L (ref 130–450)
PMV BLD AUTO: 10.9 FL (ref 7–12)
POTASSIUM SERPL-SCNC: 4.7 MMOL/L (ref 3.5–5)
RBC # BLD: 5.01 E12/L (ref 3.5–5.5)
SODIUM BLD-SCNC: 140 MMOL/L (ref 132–146)
T4 FREE: 1.49 NG/DL (ref 0.93–1.7)
TOTAL PROTEIN: 7.6 G/DL (ref 6.4–8.3)
TRIGL SERPL-MCNC: 97 MG/DL (ref 0–149)
TSH SERPL DL<=0.05 MIU/L-ACNC: 0.57 UIU/ML (ref 0.27–4.2)
VLDLC SERPL CALC-MCNC: 19 MG/DL
WBC # BLD: 9.4 E9/L (ref 4.5–11.5)

## 2021-04-06 PROCEDURE — 3017F COLORECTAL CA SCREEN DOC REV: CPT | Performed by: FAMILY MEDICINE

## 2021-04-06 PROCEDURE — 99214 OFFICE O/P EST MOD 30 MIN: CPT | Performed by: FAMILY MEDICINE

## 2021-04-06 PROCEDURE — G8427 DOCREV CUR MEDS BY ELIG CLIN: HCPCS | Performed by: FAMILY MEDICINE

## 2021-04-06 PROCEDURE — G8417 CALC BMI ABV UP PARAM F/U: HCPCS | Performed by: FAMILY MEDICINE

## 2021-04-06 PROCEDURE — 1036F TOBACCO NON-USER: CPT | Performed by: FAMILY MEDICINE

## 2021-04-06 ASSESSMENT — ENCOUNTER SYMPTOMS
CONSTIPATION: 0
DIARRHEA: 0
ROS SKIN COMMENTS: NO HAIR LOSS  NO DRY SKIN  NO BRITTLE NAILS

## 2021-04-06 NOTE — PROGRESS NOTES
Eric Huston, a female of 64 y.o. came to the office 4/6/2021. Patient Active Problem List   Diagnosis    OA (osteoarthritis)    Hypothyroidism    JOSE on CPAP    HTN (hypertension)    PFO with atrial septal aneurysm    Adult BMI >=70 kg/sq m (HCC)    Rt Hemiparesis following cerebrovascular accident ([facility]A) ([facility] [address]    Neuropathy    Depression    Venous insufficiency of both lower extremities    FLAVIO (generalized anxiety disorder)    Varicose veins of left leg with edema    Cellulitis of right leg    Iron deficiency anemia    Postmenopausal vaginal bleeding    Complex endometrial hyperplasia with atypia    Lymphedema of both lower extremities          Shoulder Pain   The pain is present in the right shoulder. This is a chronic problem. The current episode started more than 1 month ago. There has been no history of extremity trauma. The quality of the pain is described as aching. Associated symptoms include a limited range of motion. Treatments tried: PT. The treatment provided no relief. Hypertension  This is a chronic problem. The current episode started more than 1 year ago. The problem is controlled. Pertinent negatives include no headaches, malaise/fatigue, palpitations or peripheral edema. There are no compliance problems. hypothyroidism: doing ok on synthroid. CVA: doing ok. inr's wnl on Coumadin. Allergies   Allergen Reactions    Pcn [Penicillins] Shortness Of Breath    Penicillin G Shortness Of Breath       Current Outpatient Medications on File Prior to Visit   Medication Sig Dispense Refill    atorvastatin (LIPITOR) 20 MG tablet Take 1 tablet by mouth daily 90 tablet 0    warfarin (COUMADIN) 5 MG tablet TAKE ONE TABLET BY MOUTH DAILY ON SUNDAYS, MONDAYS, WEDNESDAYS AND FRIDAYS.  90 tablet 0    warfarin (COUMADIN) 2.5 MG tablet On Tuesdays, Thursday, and Saturdays 90 tablet 0    loratadine (CLARITIN) 10 MG capsule Take 1 capsule by mouth daily 90 capsule 0    busPIRone headaches. Psychiatric/Behavioral: Negative for dysphoric mood. The patient is not nervous/anxious. other review of systems reviewed and are negative    OBJECTIVE:  /80   Pulse 69   Temp 97.2 °F (36.2 °C)   Ht 5' 3\" (1.6 m)   Wt (!) 360 lb (163.3 kg)   SpO2 95%   BMI 63.77 kg/m²      Physical Exam  Vitals signs reviewed. Constitutional:       Appearance: She is obese. Eyes:      General: No scleral icterus. Conjunctiva/sclera: Conjunctivae normal.   Neck:      Musculoskeletal: Neck supple. Thyroid: No thyromegaly. Vascular: No carotid bruit. Cardiovascular:      Rate and Rhythm: Normal rate and regular rhythm. Heart sounds: No murmur. Pulmonary:      Effort: Pulmonary effort is normal.      Breath sounds: Normal breath sounds. No wheezing or rales. Abdominal:      General: Bowel sounds are normal.      Palpations: Abdomen is soft. There is no mass. Tenderness: There is no abdominal tenderness. There is no guarding or rebound. Lymphadenopathy:      Cervical: No cervical adenopathy. Skin:     General: Skin is warm and dry. Neurological:      Mental Status: She is alert and oriented to person, place, and time. Psychiatric:         Mood and Affect: Mood normal.     Right Shoulder Exam     Tenderness   The patient is experiencing tenderness in the acromioclavicular joint. Range of Motion   Active abduction:  90 abnormal   Forward flexion:  90 abnormal     Tests   Apprehension: positive  Impingement: positive              ASSESSMENT AND PLAN:    Nighat May was seen today for hypothyroidism, blood work and shoulder pain. Diagnoses and all orders for this visit:    Essential hypertension  -     Comprehensive Metabolic Panel; Future    Rt Hemiparesis following cerebrovascular accident (CVA) ([facility] [address]  -     Lipid Panel; Future  -     CBC; Future    Acquired hypothyroidism  -     TSH without Reflex;  Future  -     T4, Free; Future    Chronic right shoulder pain  - Cancel: Amb External Referral To Orthopedic Surgery  -     Shiloh Radn MD, [facility], [place]    - bp stable continue med  - inr's noted form home machine.  - continue current meds  - refuses mammo and colonoscopy   - recommend covid vaccine. Return in about 4 months (around 8/6/2021).     Iva Disla, DO

## 2021-04-13 LAB — INR BLD: 1.9

## 2021-04-14 ENCOUNTER — ANTI-COAG VISIT (OUTPATIENT)
Dept: PRIMARY CARE CLINIC | Age: 61
End: 2021-04-14

## 2021-04-23 LAB — FECAL BLOOD IMMUNOCHEMICAL TEST: NEGATIVE

## 2021-04-28 ENCOUNTER — OFFICE VISIT (OUTPATIENT)
Dept: ORTHOPEDIC SURGERY | Age: 61
End: 2021-04-28
Payer: COMMERCIAL

## 2021-04-28 VITALS — HEIGHT: 63 IN | WEIGHT: 293 LBS | BODY MASS INDEX: 51.91 KG/M2

## 2021-04-28 DIAGNOSIS — M25.511 CHRONIC RIGHT SHOULDER PAIN: Primary | ICD-10-CM

## 2021-04-28 DIAGNOSIS — G89.29 CHRONIC RIGHT SHOULDER PAIN: Primary | ICD-10-CM

## 2021-04-28 PROCEDURE — 20610 DRAIN/INJ JOINT/BURSA W/O US: CPT | Performed by: ORTHOPAEDIC SURGERY

## 2021-04-28 PROCEDURE — G8428 CUR MEDS NOT DOCUMENT: HCPCS | Performed by: ORTHOPAEDIC SURGERY

## 2021-04-28 PROCEDURE — 99213 OFFICE O/P EST LOW 20 MIN: CPT | Performed by: ORTHOPAEDIC SURGERY

## 2021-04-28 PROCEDURE — 3017F COLORECTAL CA SCREEN DOC REV: CPT | Performed by: ORTHOPAEDIC SURGERY

## 2021-04-28 PROCEDURE — 1036F TOBACCO NON-USER: CPT | Performed by: ORTHOPAEDIC SURGERY

## 2021-04-28 PROCEDURE — G8417 CALC BMI ABV UP PARAM F/U: HCPCS | Performed by: ORTHOPAEDIC SURGERY

## 2021-04-28 RX ORDER — TRIAMCINOLONE ACETONIDE 40 MG/ML
40 INJECTION, SUSPENSION INTRA-ARTICULAR; INTRAMUSCULAR ONCE
Status: COMPLETED | OUTPATIENT
Start: 2021-04-28 | End: 2021-04-28

## 2021-04-28 RX ORDER — LIDOCAINE HYDROCHLORIDE 10 MG/ML
4 INJECTION, SOLUTION INFILTRATION; PERINEURAL ONCE
Status: COMPLETED | OUTPATIENT
Start: 2021-04-28 | End: 2021-04-28

## 2021-04-28 RX ORDER — TRAMADOL HYDROCHLORIDE 50 MG/1
50 TABLET ORAL EVERY 6 HOURS PRN
COMMUNITY
End: 2022-03-14 | Stop reason: SDUPTHER

## 2021-04-28 RX ADMIN — LIDOCAINE HYDROCHLORIDE 4 ML: 10 INJECTION, SOLUTION INFILTRATION; PERINEURAL at 10:57

## 2021-04-28 RX ADMIN — TRIAMCINOLONE ACETONIDE 40 MG: 40 INJECTION, SUSPENSION INTRA-ARTICULAR; INTRAMUSCULAR at 10:58

## 2021-04-28 NOTE — PROGRESS NOTES
Assisted Dr. Jaki Hampton with injection of 4 cc lidocaine/1 cc kenalog in R shoulder. Patient tolerated procedure well. Verbal instructions were given.

## 2021-04-28 NOTE — PROGRESS NOTES
Follow Up Visit     Marleny Jaramillo returns today for follow up visit regarding Right shoulder pain, last seen 5/8/2019. Treatment thus far has included physical therapy x 3 in 2019. She reports symptoms are unchanged. Physical Exam:     Height: 5' 3\" (1.6 m), Weight: (!) 360 lb (163.3 kg)    General: Alert and oriented x3, no acute distress  Cardiovascular/pulmonary: No labored breathing, peripheral perfusion intact  Musculoskeletal:    Exam of the shoulder is limited due to pain with all maneuvers. I can passively elevate her about 90 degrees with mild pain diffusely in the shoulder. There is some mild tenderness over the anterior shoulder. Skin is intact. Controlled Substances Monitoring:      Imaging:  No new images. Previous images from 2019 were reviewed, limited by body habitus, show no gross bony abnormality    Procedure Note:  Right Shoulder steroid injection     The Right shoulder was identified as the injection site. The risk and benefits of a cortisone injection were explained and the patient consented to the injection. Under sterile conditions, the subacromial space was injected from posterior approach with a mixture of 40 mg of Kenalog, 4 cc  1% Lidocaine without complication. A sterile bandage was applied. Administrations This Visit     lidocaine 1 % injection 4 mL     Admin Date  04/28/2021 Action  Given Dose  4 mL Route  Intra-articular Administered By  Teja Urban RN          triamcinolone acetonide [facility]) injection 40 mg     Admin Date  04/28/2021 Action  Given Dose  40 mg Route  Intra-articular Administered By  Teja Urban RN                    Assessment: Chronic right shoulder pain      Plan:   We discussed her shoulder today. She would like to try a steroid injection. This is performed. She will continue to work on home exercises.   We will see her back in 6 months and as needed    Vinny Ross MD  Orthopaedic Surgery   4/28/21  10:16 AM

## 2021-05-07 RX ORDER — ERGOCALCIFEROL 1.25 MG/1
50000 CAPSULE ORAL WEEKLY
Qty: 12 CAPSULE | Refills: 0 | Status: SHIPPED
Start: 2021-05-07 | End: 2021-07-30

## 2021-05-11 ENCOUNTER — ANTI-COAG VISIT (OUTPATIENT)
Dept: PRIMARY CARE CLINIC | Age: 61
End: 2021-05-11

## 2021-05-11 LAB — INR BLD: 1.9

## 2021-05-18 ENCOUNTER — TELEPHONE (OUTPATIENT)
Dept: PRIMARY CARE CLINIC | Age: 61
End: 2021-05-18

## 2021-05-18 ENCOUNTER — ANTI-COAG VISIT (OUTPATIENT)
Dept: PRIMARY CARE CLINIC | Age: 61
End: 2021-05-18

## 2021-05-18 LAB — INR BLD: 1.7

## 2021-05-24 ENCOUNTER — TELEPHONE (OUTPATIENT)
Dept: PRIMARY CARE CLINIC | Age: 61
End: 2021-05-24

## 2021-05-24 RX ORDER — BENZONATATE 100 MG/1
100 CAPSULE ORAL 3 TIMES DAILY PRN
Qty: 30 CAPSULE | Refills: 0 | Status: SHIPPED
Start: 2021-05-24 | End: 2022-01-19 | Stop reason: SDUPTHER

## 2021-05-24 NOTE — TELEPHONE ENCOUNTER
Patient called stating she is having a bad continuous cough and would like you to call in some tessalon pearls for her.

## 2021-05-25 ENCOUNTER — ANTI-COAG VISIT (OUTPATIENT)
Dept: PRIMARY CARE CLINIC | Age: 61
End: 2021-05-25

## 2021-05-25 LAB — INR BLD: 1.8

## 2021-05-25 NOTE — PROGRESS NOTES
Increase Coumadin dose of 5 mg to 5 days a week. She was on it for 4 days a week. Take 2.5 mg on Tuesdays and Saturdays only.

## 2021-05-27 ENCOUNTER — VIRTUAL VISIT (OUTPATIENT)
Dept: PRIMARY CARE CLINIC | Age: 61
End: 2021-05-27
Payer: COMMERCIAL

## 2021-05-27 DIAGNOSIS — J40 BRONCHITIS: Primary | ICD-10-CM

## 2021-05-27 PROCEDURE — G8417 CALC BMI ABV UP PARAM F/U: HCPCS | Performed by: FAMILY MEDICINE

## 2021-05-27 PROCEDURE — 1036F TOBACCO NON-USER: CPT | Performed by: FAMILY MEDICINE

## 2021-05-27 PROCEDURE — 3017F COLORECTAL CA SCREEN DOC REV: CPT | Performed by: FAMILY MEDICINE

## 2021-05-27 PROCEDURE — 99213 OFFICE O/P EST LOW 20 MIN: CPT | Performed by: FAMILY MEDICINE

## 2021-05-27 PROCEDURE — G8427 DOCREV CUR MEDS BY ELIG CLIN: HCPCS | Performed by: FAMILY MEDICINE

## 2021-05-27 RX ORDER — AZITHROMYCIN 250 MG/1
TABLET, FILM COATED ORAL
Qty: 1 PACKET | Refills: 0 | Status: ON HOLD
Start: 2021-05-27 | End: 2021-05-31

## 2021-05-27 ASSESSMENT — ENCOUNTER SYMPTOMS
RHINORRHEA: 1
SINUS PAIN: 0
WHEEZING: 0
SINUS PRESSURE: 0
COUGH: 1
SHORTNESS OF BREATH: 0
SORE THROAT: 0

## 2021-05-27 NOTE — PROGRESS NOTES
Jaden Leach, a female of 63 y.o.did a virtual visit on 5/27/2021. Patient Active Problem List   Diagnosis    OA (osteoarthritis)    Hypothyroidism    JOSE on CPAP    HTN (hypertension)    PFO with atrial septal aneurysm    Adult BMI >=70 kg/sq m (HCC)    Rt Hemiparesis following cerebrovascular accident (CVA) ([facility] [address]    Neuropathy    Depression    Venous insufficiency of both lower extremities    FLAVIO (generalized anxiety disorder)    Varicose veins of left leg with edema    Cellulitis of right leg    Iron deficiency anemia    Postmenopausal vaginal bleeding    Complex endometrial hyperplasia with atypia    Lymphedema of both lower extremities          Cough  This is a new problem. The current episode started yesterday. The problem has been unchanged. The cough is non-productive. Associated symptoms include chills, a fever, nasal congestion and rhinorrhea. Pertinent negatives include no ear pain, headaches, postnasal drip, sore throat, shortness of breath or wheezing. Treatments tried: Tessalon perles. The treatment provided mild relief. Fever   This is a new problem. The current episode started yesterday. The maximum temperature noted was 102 to 102.9 F. Associated symptoms include congestion and coughing. Pertinent negatives include no ear pain, headaches, sore throat or wheezing. She has tried acetaminophen (in bed for 3 days. ) for the symptoms. The treatment provided no relief.         Allergies   Allergen Reactions    Pcn [Penicillins] Shortness Of Breath    Penicillin G Shortness Of Breath       Current Outpatient Medications on File Prior to Visit   Medication Sig Dispense Refill    benzonatate (TESSALON PERLES) 100 MG capsule Take 1 capsule by mouth 3 times daily as needed for Cough 30 capsule 0    vitamin D (ERGOCALCIFEROL) 1.25 MG (15275 UT) CAPS capsule Take 1 capsule by mouth once a week 12 capsule 0    traMADol (ULTRAM) 50 MG tablet Take 50 mg by mouth every 6 hours as needed for Pain.  atorvastatin (LIPITOR) 20 MG tablet Take 1 tablet by mouth daily 90 tablet 0    warfarin (COUMADIN) 5 MG tablet TAKE ONE TABLET BY MOUTH DAILY ON SUNDAYS, MONDAYS, WEDNESDAYS AND FRIDAYS. 90 tablet 0    warfarin (COUMADIN) 2.5 MG tablet On Tuesdays, Thursday, and Saturdays 90 tablet 0    loratadine (CLARITIN) 10 MG capsule Take 1 capsule by mouth daily 90 capsule 0    busPIRone (BUSPAR) 15 MG tablet Take 15 mg by mouth 2 times daily 90 tablet 0    levothyroxine (SYNTHROID) 175 MCG tablet Take 1 tablet by mouth Daily 90 tablet 0    citalopram (CELEXA) 40 MG tablet Take 1 tablet by mouth daily 90 tablet 0    gabapentin (NEURONTIN) 600 MG tablet Take 1 tablet by mouth 3 times daily for 90 days. (Patient taking differently: Take 600 mg by mouth 2 times daily. ) 270 tablet 0    lisinopril (PRINIVIL;ZESTRIL) 5 MG tablet Take one tablet daily 90 tablet 0    baclofen (LIORESAL) 10 MG tablet TAKE 1/2 TABLET BY MOUTH 2 TIMES A DAY 90 tablet 0    mupirocin (BACTROBAN) 2 % ointment Apply topically 3 times daily. 22 g 2    mineral oil-hydrophilic petrolatum (HYDROPHOR) ointment Apply topically as needed. 228 g 0    Disposable Gloves (LATEX GLOVES MEDIUM) MISC Use as needed. 100 each 0    Elastic Bandages & Supports (MEDICAL COMPRESSION STOCKINGS) MISC 1 each by Does not apply route daily 1 each 0    Magnesium Cl-Calcium Carbonate (SLOW-MAG PO) Take by mouth nightly      Lift Chair MISC by Does not apply route 1 each 0    acetaminophen (TYLENOL ARTHRITIS PAIN) 650 MG extended release tablet Take 1,300 mg by mouth every 8 hours as needed for Pain      CPAP Machine MISC by Does not apply route      levonorgestrel (MIRENA, 52 MG,) IUD 52 mg 1 each by Intrauterine route once      Misc. Devices (GEL-FOAM CUSHION) MISC For chair 1 each 0     No current facility-administered medications on file prior to visit. Review of Systems   Constitutional: Positive for chills and fever.  Negative for diaphoresis. HENT: Positive for congestion and rhinorrhea. Negative for ear pain, postnasal drip, sinus pressure, sinus pain and sore throat. Has smell but no taste. Respiratory: Positive for cough. Negative for shortness of breath and wheezing. Neurological: Negative for headaches. other review of systems reviewed and are negative    OBJECTIVE:  There were no vitals taken for this visit. Physical Exam  Constitutional:       General: She is not in acute distress. Appearance: Normal appearance. She is ill-appearing. She is not toxic-appearing or diaphoretic. HENT:      Head: Normocephalic. Eyes:      General: No scleral icterus. Pulmonary:      Effort: Pulmonary effort is normal.   Neurological:      Mental Status: She is alert and oriented to person, place, and time. Psychiatric:         Mood and Affect: Mood normal.         ASSESSMENT AND PLAN:    Micha Negrete was seen today for cough and fever. Diagnoses and all orders for this visit:    Bronchitis  -     azithromycin (ZITHROMAX Z-DRE) 250 MG tablet; Take 2 pills on day 1, then 1 pill days 2-5.    -Push fluids  Tylenol as needed for fever or body aches. If worsens go to the emergency room. Return if symptoms worsen or fail to improve. Kaci Rule, DO    TeleMedicine Patient Consent    This visit was performed as a virtual video visit using a synchronous, two-way, audio-video telehealth technology platform. Patient identification was verified at the start of the visit, including the patient's telephone number and physical location. I discussed with the patient the nature of our telehealth visits, that:     1. Due to the nature of an audio- video modality, the only components of a physical exam that could be done are the elements supported by direct observation. 2. I would evaluate the patient and recommend diagnostics and treatments based on my assessment.   3. If it was felt that the patient should be evaluated in

## 2021-05-31 ENCOUNTER — HOSPITAL ENCOUNTER (INPATIENT)
Age: 61
LOS: 9 days | Discharge: HOME HEALTH | DRG: 177 | End: 2021-06-09
Attending: EMERGENCY MEDICINE | Admitting: INTERNAL MEDICINE
Payer: COMMERCIAL

## 2021-05-31 ENCOUNTER — APPOINTMENT (OUTPATIENT)
Dept: GENERAL RADIOLOGY | Age: 61
DRG: 177 | End: 2021-05-31
Payer: COMMERCIAL

## 2021-05-31 ENCOUNTER — APPOINTMENT (OUTPATIENT)
Dept: CT IMAGING | Age: 61
DRG: 177 | End: 2021-05-31
Payer: COMMERCIAL

## 2021-05-31 DIAGNOSIS — R26.2 UNABLE TO AMBULATE: ICD-10-CM

## 2021-05-31 DIAGNOSIS — J18.9 PNEUMONIA DUE TO INFECTIOUS ORGANISM, UNSPECIFIED LATERALITY, UNSPECIFIED PART OF LUNG: Primary | ICD-10-CM

## 2021-05-31 DIAGNOSIS — I69.359 HEMIPARESIS FOLLOWING CEREBROVASCULAR ACCIDENT (CVA) (HCC): Chronic | ICD-10-CM

## 2021-05-31 LAB
ALBUMIN SERPL-MCNC: 3.2 G/DL (ref 3.5–5.2)
ALP BLD-CCNC: 66 U/L (ref 35–104)
ALT SERPL-CCNC: 17 U/L (ref 0–32)
ANION GAP SERPL CALCULATED.3IONS-SCNC: 11 MMOL/L (ref 7–16)
AST SERPL-CCNC: 33 U/L (ref 0–31)
ATYPICAL LYMPHOCYTE RELATIVE PERCENT: 3.5 % (ref 0–4)
BASOPHILS ABSOLUTE: 0 E9/L (ref 0–0.2)
BASOPHILS RELATIVE PERCENT: 0 % (ref 0–2)
BILIRUB SERPL-MCNC: 0.4 MG/DL (ref 0–1.2)
BILIRUBIN URINE: NEGATIVE
BLOOD, URINE: ABNORMAL
BUN BLDV-MCNC: 20 MG/DL (ref 6–20)
CALCIUM SERPL-MCNC: 8.2 MG/DL (ref 8.6–10.2)
CHLORIDE BLD-SCNC: 97 MMOL/L (ref 98–107)
CLARITY: CLEAR
CO2: 22 MMOL/L (ref 22–29)
COLOR: YELLOW
CREAT SERPL-MCNC: 1.8 MG/DL (ref 0.5–1)
D DIMER: 528 NG/ML DDU
EOSINOPHILS ABSOLUTE: 0 E9/L (ref 0.05–0.5)
EOSINOPHILS RELATIVE PERCENT: 0 % (ref 0–6)
GFR AFRICAN AMERICAN: 35
GFR NON-AFRICAN AMERICAN: 29 ML/MIN/1.73
GLUCOSE BLD-MCNC: 98 MG/DL (ref 74–99)
GLUCOSE URINE: NEGATIVE MG/DL
HCT VFR BLD CALC: 39.5 % (ref 34–48)
HEMOGLOBIN: 12.8 G/DL (ref 11.5–15.5)
INR BLD: 7.5
KETONES, URINE: NEGATIVE MG/DL
LACTIC ACID: 1.3 MMOL/L (ref 0.5–2.2)
LEUKOCYTE ESTERASE, URINE: ABNORMAL
LYMPHOCYTES ABSOLUTE: 0.84 E9/L (ref 1.5–4)
LYMPHOCYTES RELATIVE PERCENT: 10.5 % (ref 20–42)
MCH RBC QN AUTO: 26.6 PG (ref 26–35)
MCHC RBC AUTO-ENTMCNC: 32.4 % (ref 32–34.5)
MCV RBC AUTO: 82.1 FL (ref 80–99.9)
MONOCYTES ABSOLUTE: 0.12 E9/L (ref 0.1–0.95)
MONOCYTES RELATIVE PERCENT: 1.8 % (ref 2–12)
NEUTROPHILS ABSOLUTE: 4.98 E9/L (ref 1.8–7.3)
NEUTROPHILS RELATIVE PERCENT: 83.3 % (ref 43–80)
NITRITE, URINE: POSITIVE
NUCLEATED RED BLOOD CELLS: 0 /100 WBC
PDW BLD-RTO: 16.2 FL (ref 11.5–15)
PH UA: 6 (ref 5–9)
PLATELET # BLD: 135 E9/L (ref 130–450)
PMV BLD AUTO: 11 FL (ref 7–12)
POTASSIUM REFLEX MAGNESIUM: 3.9 MMOL/L (ref 3.5–5)
PRO-BNP: 599 PG/ML (ref 0–125)
PROMYELOCYTES PERCENT: 0.9 % (ref 0–0)
PROTEIN UA: 30 MG/DL
PROTHROMBIN TIME: 83.6 SEC (ref 9.3–12.4)
RBC # BLD: 4.81 E12/L (ref 3.5–5.5)
SARS-COV-2, NAAT: DETECTED
SODIUM BLD-SCNC: 130 MMOL/L (ref 132–146)
SPECIFIC GRAVITY UA: 1.01 (ref 1–1.03)
TOTAL PROTEIN: 7.2 G/DL (ref 6.4–8.3)
TROPONIN, HIGH SENSITIVITY: 24 NG/L (ref 0–9)
UROBILINOGEN, URINE: 0.2 E.U./DL
WBC # BLD: 6 E9/L (ref 4.5–11.5)

## 2021-05-31 PROCEDURE — 83880 ASSAY OF NATRIURETIC PEPTIDE: CPT

## 2021-05-31 PROCEDURE — 2580000003 HC RX 258: Performed by: EMERGENCY MEDICINE

## 2021-05-31 PROCEDURE — 6370000000 HC RX 637 (ALT 250 FOR IP): Performed by: INTERNAL MEDICINE

## 2021-05-31 PROCEDURE — 80053 COMPREHEN METABOLIC PANEL: CPT

## 2021-05-31 PROCEDURE — 85378 FIBRIN DEGRADE SEMIQUANT: CPT

## 2021-05-31 PROCEDURE — 6370000000 HC RX 637 (ALT 250 FOR IP): Performed by: EMERGENCY MEDICINE

## 2021-05-31 PROCEDURE — 87040 BLOOD CULTURE FOR BACTERIA: CPT

## 2021-05-31 PROCEDURE — 1200000000 HC SEMI PRIVATE

## 2021-05-31 PROCEDURE — 82728 ASSAY OF FERRITIN: CPT

## 2021-05-31 PROCEDURE — 99283 EMERGENCY DEPT VISIT LOW MDM: CPT

## 2021-05-31 PROCEDURE — 2500000003 HC RX 250 WO HCPCS: Performed by: EMERGENCY MEDICINE

## 2021-05-31 PROCEDURE — 87635 SARS-COV-2 COVID-19 AMP PRB: CPT

## 2021-05-31 PROCEDURE — 85025 COMPLETE CBC W/AUTO DIFF WBC: CPT

## 2021-05-31 PROCEDURE — 84145 PROCALCITONIN (PCT): CPT

## 2021-05-31 PROCEDURE — 85610 PROTHROMBIN TIME: CPT

## 2021-05-31 PROCEDURE — 84484 ASSAY OF TROPONIN QUANT: CPT

## 2021-05-31 PROCEDURE — 71045 X-RAY EXAM CHEST 1 VIEW: CPT

## 2021-05-31 PROCEDURE — 81001 URINALYSIS AUTO W/SCOPE: CPT

## 2021-05-31 PROCEDURE — 86140 C-REACTIVE PROTEIN: CPT

## 2021-05-31 PROCEDURE — 71250 CT THORAX DX C-: CPT

## 2021-05-31 PROCEDURE — 2580000003 HC RX 258: Performed by: INTERNAL MEDICINE

## 2021-05-31 PROCEDURE — 6360000002 HC RX W HCPCS: Performed by: EMERGENCY MEDICINE

## 2021-05-31 PROCEDURE — 83605 ASSAY OF LACTIC ACID: CPT

## 2021-05-31 PROCEDURE — 87088 URINE BACTERIA CULTURE: CPT

## 2021-05-31 RX ORDER — TRAMADOL HYDROCHLORIDE 50 MG/1
50 TABLET ORAL EVERY 6 HOURS PRN
Status: DISCONTINUED | OUTPATIENT
Start: 2021-05-31 | End: 2021-06-09 | Stop reason: HOSPADM

## 2021-05-31 RX ORDER — BENZONATATE 100 MG/1
100 CAPSULE ORAL 3 TIMES DAILY PRN
Status: DISCONTINUED | OUTPATIENT
Start: 2021-05-31 | End: 2021-06-09 | Stop reason: HOSPADM

## 2021-05-31 RX ORDER — SODIUM CHLORIDE, SODIUM LACTATE, POTASSIUM CHLORIDE, CALCIUM CHLORIDE 600; 310; 30; 20 MG/100ML; MG/100ML; MG/100ML; MG/100ML
INJECTION, SOLUTION INTRAVENOUS CONTINUOUS
Status: DISCONTINUED | OUTPATIENT
Start: 2021-05-31 | End: 2021-06-04

## 2021-05-31 RX ORDER — POLYETHYLENE GLYCOL 3350 17 G/17G
17 POWDER, FOR SOLUTION ORAL DAILY PRN
Status: DISCONTINUED | OUTPATIENT
Start: 2021-05-31 | End: 2021-06-09 | Stop reason: HOSPADM

## 2021-05-31 RX ORDER — PROMETHAZINE HYDROCHLORIDE 25 MG/1
12.5 TABLET ORAL EVERY 6 HOURS PRN
Status: DISCONTINUED | OUTPATIENT
Start: 2021-05-31 | End: 2021-06-09 | Stop reason: HOSPADM

## 2021-05-31 RX ORDER — PHYTONADIONE 5 MG/1
2.5 TABLET ORAL ONCE
Status: COMPLETED | OUTPATIENT
Start: 2021-05-31 | End: 2021-05-31

## 2021-05-31 RX ORDER — ACETAMINOPHEN 650 MG/1
650 SUPPOSITORY RECTAL EVERY 6 HOURS PRN
Status: DISCONTINUED | OUTPATIENT
Start: 2021-05-31 | End: 2021-06-09 | Stop reason: HOSPADM

## 2021-05-31 RX ORDER — SODIUM CHLORIDE 0.9 % (FLUSH) 0.9 %
5-40 SYRINGE (ML) INJECTION PRN
Status: DISCONTINUED | OUTPATIENT
Start: 2021-05-31 | End: 2021-06-08

## 2021-05-31 RX ORDER — BACLOFEN 10 MG/1
5 TABLET ORAL 2 TIMES DAILY PRN
Status: DISCONTINUED | OUTPATIENT
Start: 2021-05-31 | End: 2021-06-09 | Stop reason: HOSPADM

## 2021-05-31 RX ORDER — ATORVASTATIN CALCIUM 20 MG/1
20 TABLET, FILM COATED ORAL DAILY
Status: DISCONTINUED | OUTPATIENT
Start: 2021-06-01 | End: 2021-06-09 | Stop reason: HOSPADM

## 2021-05-31 RX ORDER — ACETAMINOPHEN 325 MG/1
650 TABLET ORAL ONCE
Status: COMPLETED | OUTPATIENT
Start: 2021-05-31 | End: 2021-05-31

## 2021-05-31 RX ORDER — BUSPIRONE HYDROCHLORIDE 5 MG/1
15 TABLET ORAL 2 TIMES DAILY
Status: DISCONTINUED | OUTPATIENT
Start: 2021-06-01 | End: 2021-06-09 | Stop reason: HOSPADM

## 2021-05-31 RX ORDER — SODIUM CHLORIDE 0.9 % (FLUSH) 0.9 %
5-40 SYRINGE (ML) INJECTION EVERY 12 HOURS SCHEDULED
Status: DISCONTINUED | OUTPATIENT
Start: 2021-05-31 | End: 2021-06-09 | Stop reason: HOSPADM

## 2021-05-31 RX ORDER — ONDANSETRON 2 MG/ML
4 INJECTION INTRAMUSCULAR; INTRAVENOUS EVERY 6 HOURS PRN
Status: DISCONTINUED | OUTPATIENT
Start: 2021-05-31 | End: 2021-06-09 | Stop reason: HOSPADM

## 2021-05-31 RX ORDER — CITALOPRAM 20 MG/1
40 TABLET ORAL DAILY
Status: DISCONTINUED | OUTPATIENT
Start: 2021-06-01 | End: 2021-06-09 | Stop reason: HOSPADM

## 2021-05-31 RX ORDER — DEXAMETHASONE SODIUM PHOSPHATE 4 MG/ML
6 INJECTION, SOLUTION INTRA-ARTICULAR; INTRALESIONAL; INTRAMUSCULAR; INTRAVENOUS; SOFT TISSUE DAILY
Status: DISCONTINUED | OUTPATIENT
Start: 2021-06-01 | End: 2021-06-05

## 2021-05-31 RX ORDER — SODIUM CHLORIDE 9 MG/ML
25 INJECTION, SOLUTION INTRAVENOUS PRN
Status: DISCONTINUED | OUTPATIENT
Start: 2021-05-31 | End: 2021-06-09 | Stop reason: HOSPADM

## 2021-05-31 RX ORDER — ACETAMINOPHEN 325 MG/1
650 TABLET ORAL EVERY 6 HOURS PRN
Status: DISCONTINUED | OUTPATIENT
Start: 2021-05-31 | End: 2021-06-09 | Stop reason: HOSPADM

## 2021-05-31 RX ADMIN — PHYTONADIONE 2.5 MG: 5 TABLET ORAL at 23:45

## 2021-05-31 RX ADMIN — DOXYCYCLINE 100 MG: 100 INJECTION, POWDER, LYOPHILIZED, FOR SOLUTION INTRAVENOUS at 20:34

## 2021-05-31 RX ADMIN — SODIUM CHLORIDE, POTASSIUM CHLORIDE, SODIUM LACTATE AND CALCIUM CHLORIDE: 600; 310; 30; 20 INJECTION, SOLUTION INTRAVENOUS at 22:23

## 2021-05-31 RX ADMIN — ACETAMINOPHEN 650 MG: 325 TABLET ORAL at 20:32

## 2021-05-31 RX ADMIN — WATER 2000 MG: 1 INJECTION INTRAMUSCULAR; INTRAVENOUS; SUBCUTANEOUS at 20:34

## 2021-05-31 RX ADMIN — Medication 10 ML: at 23:47

## 2021-05-31 ASSESSMENT — PAIN SCALES - GENERAL: PAINLEVEL_OUTOF10: 3

## 2021-05-31 NOTE — ED NOTES
Bed:  [place] [address]  Expected date:   Expected time:   Means of arrival:   Comments:  Latasha Hampton RN  05/31/21 9568 Oxygen saturations documented through [facility].

## 2021-06-01 PROBLEM — J12.82 PNEUMONIA DUE TO COVID-19 VIRUS: Status: ACTIVE | Noted: 2021-05-31

## 2021-06-01 PROBLEM — U07.1 PNEUMONIA DUE TO COVID-19 VIRUS: Status: ACTIVE | Noted: 2021-05-31

## 2021-06-01 PROBLEM — I69.351 HEMIPARESIS AFFECTING RIGHT SIDE AS LATE EFFECT OF CEREBROVASCULAR ACCIDENT (CVA) (HCC): Status: ACTIVE | Noted: 2021-06-01

## 2021-06-01 LAB
ALBUMIN SERPL-MCNC: 2.9 G/DL (ref 3.5–5.2)
ALP BLD-CCNC: 64 U/L (ref 35–104)
ALT SERPL-CCNC: 17 U/L (ref 0–32)
ANION GAP SERPL CALCULATED.3IONS-SCNC: 11 MMOL/L (ref 7–16)
ANISOCYTOSIS: ABNORMAL
APTT: 44.4 SEC (ref 24.5–35.1)
AST SERPL-CCNC: 37 U/L (ref 0–31)
ATYPICAL LYMPHOCYTE RELATIVE PERCENT: 0.9 % (ref 0–4)
BACTERIA: ABNORMAL /HPF
BASOPHILS ABSOLUTE: 0 E9/L (ref 0–0.2)
BASOPHILS RELATIVE PERCENT: 0 % (ref 0–2)
BILIRUB SERPL-MCNC: 0.3 MG/DL (ref 0–1.2)
BUN BLDV-MCNC: 21 MG/DL (ref 6–20)
C-REACTIVE PROTEIN: 9.8 MG/DL (ref 0–0.4)
CALCIUM SERPL-MCNC: 8.4 MG/DL (ref 8.6–10.2)
CHLORIDE BLD-SCNC: 102 MMOL/L (ref 98–107)
CO2: 22 MMOL/L (ref 22–29)
CREAT SERPL-MCNC: 1.7 MG/DL (ref 0.5–1)
D DIMER: 527 NG/ML DDU
EOSINOPHILS ABSOLUTE: 0 E9/L (ref 0.05–0.5)
EOSINOPHILS RELATIVE PERCENT: 0 % (ref 0–6)
FERRITIN: 384 NG/ML
GFR AFRICAN AMERICAN: 37
GFR NON-AFRICAN AMERICAN: 31 ML/MIN/1.73
GLUCOSE BLD-MCNC: 93 MG/DL (ref 74–99)
HCT VFR BLD CALC: 39.1 % (ref 34–48)
HEMOGLOBIN: 12.3 G/DL (ref 11.5–15.5)
INR BLD: 6.7
LYMPHOCYTES ABSOLUTE: 0.99 E9/L (ref 1.5–4)
LYMPHOCYTES RELATIVE PERCENT: 20 % (ref 20–42)
MCH RBC QN AUTO: 26.1 PG (ref 26–35)
MCHC RBC AUTO-ENTMCNC: 31.5 % (ref 32–34.5)
MCV RBC AUTO: 83 FL (ref 80–99.9)
MONOCYTES ABSOLUTE: 0.19 E9/L (ref 0.1–0.95)
MONOCYTES RELATIVE PERCENT: 4.3 % (ref 2–12)
NEUTROPHILS ABSOLUTE: 3.53 E9/L (ref 1.8–7.3)
NEUTROPHILS RELATIVE PERCENT: 74.8 % (ref 43–80)
NUCLEATED RED BLOOD CELLS: 0 /100 WBC
PDW BLD-RTO: 16.3 FL (ref 11.5–15)
PLATELET # BLD: 130 E9/L (ref 130–450)
PMV BLD AUTO: 10.8 FL (ref 7–12)
POIKILOCYTES: ABNORMAL
POLYCHROMASIA: ABNORMAL
POTASSIUM REFLEX MAGNESIUM: 3.9 MMOL/L (ref 3.5–5)
PROCALCITONIN: 0.2 NG/ML (ref 0–0.08)
PROCALCITONIN: 0.23 NG/ML (ref 0–0.08)
PROTHROMBIN TIME: 74.7 SEC (ref 9.3–12.4)
RBC # BLD: 4.71 E12/L (ref 3.5–5.5)
RBC UA: ABNORMAL /HPF (ref 0–2)
SODIUM BLD-SCNC: 135 MMOL/L (ref 132–146)
TEAR DROP CELLS: ABNORMAL
TOTAL PROTEIN: 7.1 G/DL (ref 6.4–8.3)
WBC # BLD: 4.7 E9/L (ref 4.5–11.5)
WBC UA: ABNORMAL /HPF (ref 0–5)

## 2021-06-01 PROCEDURE — 84145 PROCALCITONIN (PCT): CPT

## 2021-06-01 PROCEDURE — 6360000002 HC RX W HCPCS: Performed by: INTERNAL MEDICINE

## 2021-06-01 PROCEDURE — 85610 PROTHROMBIN TIME: CPT

## 2021-06-01 PROCEDURE — 80053 COMPREHEN METABOLIC PANEL: CPT

## 2021-06-01 PROCEDURE — 85730 THROMBOPLASTIN TIME PARTIAL: CPT

## 2021-06-01 PROCEDURE — 6370000000 HC RX 637 (ALT 250 FOR IP): Performed by: INTERNAL MEDICINE

## 2021-06-01 PROCEDURE — 2580000003 HC RX 258: Performed by: INTERNAL MEDICINE

## 2021-06-01 PROCEDURE — 85378 FIBRIN DEGRADE SEMIQUANT: CPT

## 2021-06-01 PROCEDURE — 1200000000 HC SEMI PRIVATE

## 2021-06-01 PROCEDURE — 36415 COLL VENOUS BLD VENIPUNCTURE: CPT

## 2021-06-01 PROCEDURE — 94660 CPAP INITIATION&MGMT: CPT

## 2021-06-01 PROCEDURE — 85025 COMPLETE CBC W/AUTO DIFF WBC: CPT

## 2021-06-01 RX ADMIN — BUSPIRONE HYDROCHLORIDE 15 MG: 5 TABLET ORAL at 09:25

## 2021-06-01 RX ADMIN — CITALOPRAM HYDROBROMIDE 40 MG: 20 TABLET ORAL at 09:25

## 2021-06-01 RX ADMIN — DEXAMETHASONE SODIUM PHOSPHATE 6 MG: 4 INJECTION, SOLUTION INTRAMUSCULAR; INTRAVENOUS at 09:25

## 2021-06-01 RX ADMIN — ATORVASTATIN CALCIUM 20 MG: 20 TABLET, FILM COATED ORAL at 09:25

## 2021-06-01 RX ADMIN — ACETAMINOPHEN 650 MG: 325 TABLET ORAL at 09:25

## 2021-06-01 RX ADMIN — WATER 1000 MG: 1 INJECTION INTRAMUSCULAR; INTRAVENOUS; SUBCUTANEOUS at 22:00

## 2021-06-01 RX ADMIN — BUSPIRONE HYDROCHLORIDE 15 MG: 5 TABLET ORAL at 16:44

## 2021-06-01 RX ADMIN — LEVOTHYROXINE SODIUM 175 MCG: 125 TABLET ORAL at 09:25

## 2021-06-01 RX ADMIN — Medication 10 ML: at 09:24

## 2021-06-01 RX ADMIN — BENZONATATE 100 MG: 100 CAPSULE ORAL at 09:25

## 2021-06-01 ASSESSMENT — PAIN SCALES - GENERAL
PAINLEVEL_OUTOF10: 3
PAINLEVEL_OUTOF10: 0

## 2021-06-01 NOTE — H&P
oil-hydrophilic petrolatum (HYDROPHOR) ointment, Apply topically as needed. Disposable Gloves (LATEX GLOVES MEDIUM) MISC, Use as needed. Elastic Bandages & Supports (MEDICAL COMPRESSION STOCKINGS) MISC, 1 each by Does not apply route daily  Magnesium Cl-Calcium Carbonate (SLOW-MAG PO), Take by mouth nightly  Lift Chair MISC, by Does not apply route  acetaminophen (TYLENOL ARTHRITIS PAIN) 650 MG extended release tablet, Take 1,300 mg by mouth every 8 hours as needed for Pain  CPAP Machine MISC, by Does not apply route  levonorgestrel (MIRENA, 52 MG,) IUD 52 mg, 1 each by Intrauterine route once  Misc. Devices (GEL-FOAM CUSHION) MISC, For chair    Allergies:    Pcn [penicillins] and Penicillin g    Social History:    reports that she has never smoked. She has never used smokeless tobacco. She reports that she does not drink alcohol and does not use drugs. Family History:   family history includes Diabetes in her father and mother; Stroke in her father. REVIEW OF SYSTEMS:  As above in the HPI, otherwise negative    PHYSICAL EXAM:    Vitals:  BP (!) 121/58   Pulse 65   Temp 99.5 °F (37.5 °C) (Oral)   Resp 16   Ht 5' 3\" (1.6 m)   Wt (!) 350 lb (158.8 kg)   SpO2 91%   BMI 62.00 kg/m²     General:  Awake, alert, oriented X 3. Well developed, well nourished, well groomed. ill-appearing, flushed. HEENT:  Normocephalic, atraumatic. Pupils equal, round, reactive to light. No scleral icterus. No conjunctival injection. Normal lips, teeth, and gums. No nasal discharge. Neck:  Supple  Heart:  RRR, no murmurs, gallops, rubs  Lungs:  CTA bilaterally, bilat symmetrical expansion, no wheeze, rales, or rhonchi, currently on room air  Abdomen:   Bowel sounds present, soft, nontender, no masses, no organomegaly, no peritoneal signs  Extremities:  No clubbing, cyanosis, or edema  Skin:  Warm and dry, no open lesions or rash  Neuro:  Cranial nerves 2-12 intact, no focal deficits  Breast: deferred  Rectal:

## 2021-06-01 NOTE — PROGRESS NOTES
P Quality Flow/Interdisciplinary Rounds Progress Note        Quality Flow Rounds held on June 1, 2021    Disciplines Attending:  Bedside Nurse, ,  and Nursing Unit Leadership    Lorene Finnegan was admitted on 5/31/2021  4:28 PM    Anticipated Discharge Date:  Expected Discharge Date: 06/03/21    Disposition:    Roberto Score:  Roberto Scale Score: 14    Readmission Risk              Risk of Unplanned Readmission:  16           Discussed patient goal for the day, patient clinical progression, and barriers to discharge. The following Goal(s) of the Day/Commitment(s) have been identified:  Monitor INR, Monitor SP02, IV fluids.        Pavel Major RN  June 1, 2021

## 2021-06-01 NOTE — PROGRESS NOTES
Pharmacy Consultation Note  (Warfarin Dosing and Monitoring)    Initial consult date: 6/1  Consulting physician: Terrell Benito    Allergies:  Pcn [penicillins] and Penicillin g    63 y.o. female    Ht Readings from Last 1 Encounters:   05/31/21 5' 3\" (1.6 m)     Wt Readings from Last 1 Encounters:   05/31/21 (!) 350 lb (158.8 kg)         Warfarin Indication Target   INR [place] Dose  (if applicable) Diet/Feeding Tube   (Enteral feeds, nutritional drinks, increased Vitamin K in diet can decrease INR)   H/o CVA 2-3 Recent changes: 5mg daily General       x Home Med? Meds Increasing INR x Home Med?  Meds Decreasing INR     Allopurinol    Azathioprine     Amiodarone/Propafenone/Dronedarone   Carbamazepine     Androgens   Cholestyramine     Chemotherapy (BBW: Capecitabine)   Estrogen     Ciprofloxacin/Levofloxacin   Nafcillin/Dicloxacillin     Clarithromycin/Erythromycin/Azithromycin   Barbiturates      Fluconazole/Itraconazole/Voriconazole/Ketoconazole   Phenytoin (Variable)     Metronidazole   Rifampin     Phenytoin (Variable)   Steroids (Variable/Dose Dependent)   X   Statins/Fenofibrate/Gemfibrozil   Sucralfate     Steroids (Variable/Dose Dependent)   Other:     Sulfamethoxazole/Trimethoprim        Tramadol         Other:        Comments regarding medication interactions:      x Diseases Affecting INR x Increased Bleeding Risk    CHF Exacerbation (Increases)  History GI Bleed/PUD    Liver Disease (Increases)  Chronic NSAID Use    Thyroid: Hyper (Increases)  Hypo (Decreases)  Chronic ASA/Antiplatelet Use (Clopidogrel/ Dipyridamole/Prasugrel/Ticagrelor)      Malignancy (Increases)  Abnormal Renal Function (dialysis, renal transplant, SCr ? 2.3 mg/dL)     History of EtOH Abuse: Acute (Increases)   Chronic (Decreases)  Liver Function (cirrhosis, bilirubin >2x ULN with AST/ALT/AP >3x ULN)   X Fever (Increases)  Age > 65 years   X Acute infection (Increases)  Hypertension/Uncontrolled BP   X Diarrhea/Dehydration (Increases) History of stroke    Other: __________________  Other:___________________       Vitamin K or Blood product  Administration Date    Vit K 2.5mg PO x1 5/31              TSH:    Lab Results   Component Value Date    TSH 0.568 04/06/2021        Hepatic Function Panel:                            Lab Results   Component Value Date    ALKPHOS 64 06/01/2021    ALT 17 06/01/2021    AST 37 06/01/2021    PROT 7.1 06/01/2021    BILITOT 0.3 06/01/2021    BILIDIR <0.2 04/28/2017    IBILI 0.1 04/28/2017    LABALBU 2.9 06/01/2021    LABALBU 4.2 05/24/2012       Date Warfarin Dose INR Heparin or LMWH HGB/HCT PLT Comment   5/31 HOLD 7.5 -- 12.8/39.5 135 Vit K 2.5mg PO x1   6/1 HOLD 6.7 -- 12.3/39.1 130                                 Assessment and Plan:  · Patient is a 62 yo female presents with COVID   · Patient is on warfarin for hx of CVA   · Goal INR 2-3  · INR 6.7 today  · Warfarin on hold tonight  · Daily PT/INR until the INR is stable within the therapeutic range  · Pharmacist will follow and monitor/adjust dosing as necessary    Thank you for this consult,    MEKA Mckinley DESIRE [facility] PharmD 6/1/2021 11:25 AM

## 2021-06-01 NOTE — ACP (ADVANCE CARE PLANNING)
25-Jun-2019 06:47
wishes  [] New Advance Directive completed  [] Portable Do Not Rescitate prepared for Provider review and signature  [] POLST/POST/MOLST/MOST prepared for Provider review and signature      Follow-up plan:    [] Schedule follow-up conversation to continue planning  [x] Referred individual to Provider for additional questions/concerns   [] Advised patient/agent/surrogate to review completed ACP document and update if needed with changes in condition, patient preferences or care setting    [] This note routed to one or more involved healthcare providers

## 2021-06-01 NOTE — CARE COORDINATION
Social Work:    (Covid +5-31, pneumonia, INR 7.5, history of stroke & TIA.)  Social service placed a call to Brenda Ruiz, offered support, explained social service &  roles, as well as discussed discharge planning. Brenda Ruiz resides aone in her two-story home. She has a ramp entrance into her home and her bedroom & bathroom is located on the main floor. Jurgen Carrillo uses a wheeled walker, wheelchair, CPAP, BSC, and is on a disability waiver program that provides her with home aide service 5 hrs daily. Her  is Chris Manning at [phone]. Brenda Ruiz has been in [facility] and has used [facility]. Brenda Ruiz prefers to return home with [facility], after choices were provided, and will need an ambulance or ambulette ride home, depending on how well she is. Brenda Ruiz will consider [place] acute rehab only if needed. Social service will need to cofirm plans after therapy,etc.  A tentative referral was given to Sarah at [facility]. (ambulette/ambulance form completed)    Electronically signed by MONSE Boston on 6/1/2021 at 10:40 AM     The Plan for Transition of Care is related to the following treatment goals: HHC, DME, SNf, Actute    The Patient and/or patient representative Karl Villagomez was provided with a choice of provider and agrees with the discharge plan. [x] Yes [] No    Freedom of choice list was provided with basic dialogue that supports the patient's individualized plan of care/goals, treatment preferences and shares the quality data associated with the providers.  [x] Yes [] No    Electronically signed by MONSE Boston on 6/1/2021 at 10:45 AM

## 2021-06-01 NOTE — PROGRESS NOTES
Patient stated that she does not want to be turned and repositioned and is refusing heel protectors. Patient states that she does not reposition at home often. Upon assessment the patient coccyx was red but blanchable with no wounds. This RN educated the patient on the importance of turning and repositioning to prevent skin break down but she is still refusing at this time.

## 2021-06-01 NOTE — PROGRESS NOTES
Pt refuses any turning throughout the day other than to be turned when her bed is wet or to use the bedpan. Educated the patient on the importance of turning to prevent skin breakdown and she voices understanding but continues to refuse. Pt states she stays on her back at all times at home and she will be fine like that.

## 2021-06-02 PROBLEM — N18.30 CKD (CHRONIC KIDNEY DISEASE) STAGE 3, GFR 30-59 ML/MIN (HCC): Status: ACTIVE | Noted: 2021-06-02

## 2021-06-02 LAB
ANION GAP SERPL CALCULATED.3IONS-SCNC: 9 MMOL/L (ref 7–16)
ANISOCYTOSIS: ABNORMAL
ATYPICAL LYMPHOCYTE RELATIVE PERCENT: 1.7 % (ref 0–4)
BASOPHILS ABSOLUTE: 0 E9/L (ref 0–0.2)
BASOPHILS RELATIVE PERCENT: 0 % (ref 0–2)
BUN BLDV-MCNC: 21 MG/DL (ref 6–20)
BURR CELLS: ABNORMAL
C DIFF TOXIN/ANTIGEN: NORMAL
CALCIUM SERPL-MCNC: 8.3 MG/DL (ref 8.6–10.2)
CHLORIDE BLD-SCNC: 105 MMOL/L (ref 98–107)
CO2: 23 MMOL/L (ref 22–29)
CREAT SERPL-MCNC: 1.4 MG/DL (ref 0.5–1)
D DIMER: 395 NG/ML DDU
EOSINOPHILS ABSOLUTE: 0 E9/L (ref 0.05–0.5)
EOSINOPHILS RELATIVE PERCENT: 0 % (ref 0–6)
GFR AFRICAN AMERICAN: 47
GFR NON-AFRICAN AMERICAN: 38 ML/MIN/1.73
GLUCOSE BLD-MCNC: 162 MG/DL (ref 74–99)
HCT VFR BLD CALC: 37.8 % (ref 34–48)
HEMOGLOBIN: 12.4 G/DL (ref 11.5–15.5)
INR BLD: 2.5
LYMPHOCYTES ABSOLUTE: 0.25 E9/L (ref 1.5–4)
LYMPHOCYTES RELATIVE PERCENT: 5.2 % (ref 20–42)
MCH RBC QN AUTO: 27.1 PG (ref 26–35)
MCHC RBC AUTO-ENTMCNC: 32.8 % (ref 32–34.5)
MCV RBC AUTO: 82.7 FL (ref 80–99.9)
MONOCYTES ABSOLUTE: 0.14 E9/L (ref 0.1–0.95)
MONOCYTES RELATIVE PERCENT: 3.5 % (ref 2–12)
NEUTROPHILS ABSOLUTE: 3.15 E9/L (ref 1.8–7.3)
NEUTROPHILS RELATIVE PERCENT: 89.6 % (ref 43–80)
NUCLEATED RED BLOOD CELLS: 0 /100 WBC
PDW BLD-RTO: 16.5 FL (ref 11.5–15)
PLATELET # BLD: 169 E9/L (ref 130–450)
PMV BLD AUTO: 10.8 FL (ref 7–12)
POIKILOCYTES: ABNORMAL
POLYCHROMASIA: ABNORMAL
POTASSIUM SERPL-SCNC: 4.3 MMOL/L (ref 3.5–5)
PROTHROMBIN TIME: 27.4 SEC (ref 9.3–12.4)
RBC # BLD: 4.57 E12/L (ref 3.5–5.5)
SODIUM BLD-SCNC: 137 MMOL/L (ref 132–146)
TSH SERPL DL<=0.05 MIU/L-ACNC: 0.03 UIU/ML (ref 0.27–4.2)
URINE CULTURE, ROUTINE: NORMAL
WBC # BLD: 3.5 E9/L (ref 4.5–11.5)

## 2021-06-02 PROCEDURE — 1200000000 HC SEMI PRIVATE

## 2021-06-02 PROCEDURE — 85378 FIBRIN DEGRADE SEMIQUANT: CPT

## 2021-06-02 PROCEDURE — 36415 COLL VENOUS BLD VENIPUNCTURE: CPT

## 2021-06-02 PROCEDURE — 97161 PT EVAL LOW COMPLEX 20 MIN: CPT

## 2021-06-02 PROCEDURE — 87449 NOS EACH ORGANISM AG IA: CPT

## 2021-06-02 PROCEDURE — 80048 BASIC METABOLIC PNL TOTAL CA: CPT

## 2021-06-02 PROCEDURE — 6360000002 HC RX W HCPCS: Performed by: INTERNAL MEDICINE

## 2021-06-02 PROCEDURE — 85610 PROTHROMBIN TIME: CPT

## 2021-06-02 PROCEDURE — 94660 CPAP INITIATION&MGMT: CPT

## 2021-06-02 PROCEDURE — 87324 CLOSTRIDIUM AG IA: CPT

## 2021-06-02 PROCEDURE — 6370000000 HC RX 637 (ALT 250 FOR IP): Performed by: INTERNAL MEDICINE

## 2021-06-02 PROCEDURE — 2580000003 HC RX 258: Performed by: INTERNAL MEDICINE

## 2021-06-02 PROCEDURE — 85025 COMPLETE CBC W/AUTO DIFF WBC: CPT

## 2021-06-02 PROCEDURE — 84443 ASSAY THYROID STIM HORMONE: CPT

## 2021-06-02 RX ADMIN — BENZONATATE 100 MG: 100 CAPSULE ORAL at 08:12

## 2021-06-02 RX ADMIN — WARFARIN SODIUM 3.5 MG: 2.5 TABLET ORAL at 17:21

## 2021-06-02 RX ADMIN — DEXAMETHASONE SODIUM PHOSPHATE 6 MG: 4 INJECTION, SOLUTION INTRAMUSCULAR; INTRAVENOUS at 08:00

## 2021-06-02 RX ADMIN — LEVOTHYROXINE SODIUM 175 MCG: 125 TABLET ORAL at 07:59

## 2021-06-02 RX ADMIN — WATER 1000 MG: 1 INJECTION INTRAMUSCULAR; INTRAVENOUS; SUBCUTANEOUS at 20:27

## 2021-06-02 RX ADMIN — BUSPIRONE HYDROCHLORIDE 15 MG: 5 TABLET ORAL at 17:21

## 2021-06-02 RX ADMIN — CITALOPRAM HYDROBROMIDE 40 MG: 20 TABLET ORAL at 07:59

## 2021-06-02 RX ADMIN — SODIUM CHLORIDE, POTASSIUM CHLORIDE, SODIUM LACTATE AND CALCIUM CHLORIDE: 600; 310; 30; 20 INJECTION, SOLUTION INTRAVENOUS at 05:10

## 2021-06-02 RX ADMIN — BUSPIRONE HYDROCHLORIDE 15 MG: 5 TABLET ORAL at 07:59

## 2021-06-02 RX ADMIN — ATORVASTATIN CALCIUM 20 MG: 20 TABLET, FILM COATED ORAL at 07:59

## 2021-06-02 RX ADMIN — Medication 10 ML: at 20:28

## 2021-06-02 ASSESSMENT — PAIN SCALES - GENERAL: PAINLEVEL_OUTOF10: 0

## 2021-06-02 NOTE — PROGRESS NOTES
Patient refusing to be turned, this nurse educated patient on importance of turning to prevent skin breakdown. Patient understands risks and continues to refuse. Will continue to monitor and educate.

## 2021-06-02 NOTE — PROGRESS NOTES
Physical Therapy    Facility/Department: [address] INTERMEDIATE  Initial Assessment    NAME: Silvina Ramirez  : 1960  MRN: [id number]    Date of Service: 2021      Patient Diagnosis(es): The encounter diagnosis was Rt Hemiparesis following cerebrovascular accident (CVA) ([facility] [address] has a past medical history of Cerebral artery occlusion with cerebral infarction ([facility] [address] Depression, FLAVIO (generalized anxiety disorder), Hypertension, Hypothyroidism, Lymphedema of both lower extremities, Neuropathy, JOSE on CPAP, Osteoarthritis, PFO with atrial septal aneurysm, Pneumonia due to COVID-19 virus, Rt Hemiparesis following cerebrovascular accident (CVA) ([facility] [address] TIA (transient ischemic attack), Varicose veins of left leg with edema, Varicose veins of right leg with edema, and Venous insufficiency of both lower extremities. has a past surgical history that includes Total knee arthroplasty (2008); Tonsillectomy; Finger trigger release; Dilation and curettage of uterus (N/A, 2017); other surgical history (N/A, 2017); joint replacement (Bilateral, 2008); and Endometrial biopsy (N/A, 2018). Referring Provider:  Franko Reed MD      Evaluating Therapist: Luis Guajardo PT      Room #:629  DIAGNOSIS: Pneumonia  Additional Pertinent History:CVA with R side weakness  PRECAUTIONS: falls, droplet plus isolaiton    Social:  Pt lives alone in a 1 floor plan has caregiver 5 hrs a day that assist with cleaning, meals, bathing. Prior to admission Pt non-ambulatory. States transfers to w/c independnetly     Initial Evaluation  Date: 21 Treatment      Short Term/ Long Term   Goals   Was pt agreeable to Eval/treatment? yes     Does pt have pain?  Pain R UE and LE, chronic from CVA     Bed Mobility  Rolling: mod assist  Supine to sit: mod assist  Sit to supine: mod assist  Scooting: mod assist  Min assist   Transfers Sit to stand: NT  Stand to sit: NT  Stand pivot: NT  Mod assist   Ambulation    NT  N/A Stair Negotiation  Ascended and descended  NT   N/A   LE strength     3+/5    4-/5   balance     Initial sitting balance min assist, progressed to SBA     AM-PAC Raw score               8/24         Pt is alert and Oriented   LE ROM: WFl  Sensation: decreased R UE and LE  Edema: B LEs R>L  Endurance: fair     ASSESSMENT  Pt displays functional ability as noted in the objective portion of this evaluation. Patient education  Pt educated on PT objectives    Patient response to education:   Pt verbalized understanding Pt demonstrated skill Pt requires further education in this area   yes        ASSESSMENT:    Comments:  Pt agreeable to PT session, did not feel up to trying to stand at this time. Pt fearful of falling upon sitting up edge of bed. Pt's/ family goals   1. To return home    Patient and or family understand(s) diagnosis, prognosis, and plan of care. PLAN OF CARE:    Current Treatment Recommendations     [x] Strengthening     [] ROM   [x] Balance Training   [x] Endurance Training   [x] Transfer Training   [] Gait Training   [] Stair Training   [x] Positioning   [x] Safety and Education Training   [x] Patient/Caregiver Education   [] HEP  [] Other     Frequency of treatments: 2-5x/week x 5.days    Time in  130  Time out  147      Evaluation Time includes thorough review of current medical information, gathering information on past medical history/social history and prior level of function, completion of standardized testing/informal observation of tasks, assessment of data and education on plan of care and goals.     CPT codes:  [x] Low Complexity PT evaluation 67914  [] Moderate Complexity PT evaluation 55447  [] High Complexity PT evaluation 21359  [] PT Re-evaluation 87384  [] Gait training 60517 minutes  [] Manual therapy 57934 minutes  [] Therapeutic activities 25369 minutes  [] Therapeutic exercises 89461 minutes  [] Neuromuscular reeducation 98582 minutes     [facility] PT [id number]

## 2021-06-02 NOTE — PROGRESS NOTES
Diarrhea/Dehydration (Increases)  History of stroke    Other: __________________  Other:___________________       Vitamin K or Blood product  Administration Date    Vit K 2.5mg PO x1 5/31              TSH:    Lab Results   Component Value Date    TSH 0.568 04/06/2021        Hepatic Function Panel:                            Lab Results   Component Value Date    ALKPHOS 64 06/01/2021    ALT 17 06/01/2021    AST 37 06/01/2021    PROT 7.1 06/01/2021    BILITOT 0.3 06/01/2021    BILIDIR <0.2 04/28/2017    IBILI 0.1 04/28/2017    LABALBU 2.9 06/01/2021    LABALBU 4.2 05/24/2012       Date Warfarin Dose INR Heparin or LMWH HGB/HCT PLT Comment   5/31 HOLD 7.5 -- 12.8/39.5 135 Vit K 2.5mg PO x1   6/1 HOLD 6.7 -- 12.3/39.1 130    6/2 3.5mg 2.5 -- -- **                        Assessment and Plan:  · Patient is a 62 yo female presents with COVID   · Patient is on warfarin for hx of CVA   · Goal INR 2-3  · INR 2.5 today; continuing to drop s/p vit K administration.  May cont to drop over the next day or so  · Warfarin 3.5mg tonight  · Daily PT/INR until the INR is stable within the therapeutic range  · Pharmacist will follow and monitor/adjust dosing as necessary    Thank you for this consult,    MEKA Avelar DESIRE [facility] PharmD 6/2/2021 9:20 AM

## 2021-06-02 NOTE — PROGRESS NOTES
Date: 6/1/2021    Time: 10:14 PM    Patient Placed On BIPAP/CPAP/ Non-Invasive Ventilation? No    If no must comment.       Pt refused cpap darshan Dimas RCP

## 2021-06-02 NOTE — PROGRESS NOTES
P Quality Flow/Interdisciplinary Rounds Progress Note        Quality Flow Rounds held on June 2, 2021    Disciplines Attending:  Bedside Nurse and     Yaneth Cantu was admitted on 5/31/2021  4:28 PM    Anticipated Discharge Date:  Expected Discharge Date: 06/03/21    Disposition:    Roberto Score:  Roberto Scale Score: 16    Readmission Risk              Risk of Unplanned Readmission:  15           Discussed patient goal for the day, patient clinical progression, and barriers to discharge. The following Goal(s) of the Day/Commitment(s) have been identified:  monitor lab results.       Sanam Delgadillo RN  June 2, 2021

## 2021-06-02 NOTE — PLAN OF CARE
Problem: Airway Clearance - Ineffective  Goal: Achieve or maintain patent airway  Outcome: Ongoing     Problem: Gas Exchange - Impaired  Goal: Absence of hypoxia  Outcome: Ongoing  Goal: Promote optimal lung function  Outcome: Ongoing     Problem: Breathing Pattern - Ineffective  Goal: Ability to achieve and maintain a regular respiratory rate  Outcome: Ongoing     Problem:  Body Temperature -  Risk of, Imbalanced  Goal: Ability to maintain a body temperature within defined limits  Outcome: Ongoing  Goal: Will regain or maintain usual level of consciousness  Outcome: Ongoing  Goal: Complications related to the disease process, condition or treatment will be avoided or minimized  Outcome: Ongoing     Problem: Isolation Precautions - Risk of Spread of Infection  Goal: Prevent transmission of infection  Outcome: Ongoing     Problem: Nutrition Deficits  Goal: Optimize nutritional status  Outcome: Ongoing     Problem: Risk for Fluid Volume Deficit  Goal: Maintain normal heart rhythm  Outcome: Ongoing  Goal: Maintain absence of muscle cramping  Outcome: Ongoing  Goal: Maintain normal serum potassium, sodium, calcium, phosphorus, and pH  Outcome: Ongoing     Problem: Loneliness or Risk for Loneliness  Goal: Demonstrate positive use of time alone when socialization is not possible  Outcome: Ongoing     Problem: Fatigue  Goal: Verbalize increase energy and improved vitality  Outcome: Ongoing     Problem: Patient Education: Go to Patient Education Activity  Goal: Patient/Family Education  Outcome: Ongoing     Problem: Skin Integrity:  Goal: Will show no infection signs and symptoms  Description: Will show no infection signs and symptoms  Outcome: Ongoing  Goal: Absence of new skin breakdown  Description: Absence of new skin breakdown  Outcome: Ongoing     Problem: Falls - Risk of:  Goal: Will remain free from falls  Description: Will remain free from falls  Outcome: Ongoing  Goal: Absence of physical injury  Description: Absence of physical injury  Outcome: Ongoing

## 2021-06-02 NOTE — PROGRESS NOTES
Subjective:  Feeling better   No CP or SOB  No fever or chills   No uncontrolled pain  No vomiting or diarrhea     Objective:    BP (!) 122/58   Pulse 52   Temp 97.9 °F (36.6 °C) (Oral)   Resp 16   Ht 5' 3\" (1.6 m)   Wt (!) 350 lb (158.8 kg)   SpO2 92%   BMI 62.00 kg/m²     24HR INTAKE/OUTPUT:      Intake/Output Summary (Last 24 hours) at 6/2/2021 1009  Last data filed at 6/1/2021 1919  Gross per 24 hour   Intake --   Output 1000 ml   Net -1000 ml       General appearance: NAD, conversant obese  Neck: FROM, supple   Lungs: Clear bilaterally no wheezes, no rhonchi, no crackles  CV: RRR, no MRGs; normal carotid upstroke and amplitude without Bruits  Abdomen: Soft, non-tender; no masses or HSM  Extremities: No edema, no cyanosis, no clubbing  Skin: Intact no rash, no lesions, no ulcers    Psych: Alert and oriented normal affect  Neuro: Nonfocal  Most Recent Labs  Lab Results   Component Value Date    WBC 4.7 06/01/2021    HGB 12.3 06/01/2021    HCT 39.1 06/01/2021     06/01/2021     06/01/2021    K 3.9 06/01/2021     06/01/2021    CREATININE 1.7 (H) 06/01/2021    BUN 21 (H) 06/01/2021    CO2 22 06/01/2021    GLUCOSE 93 06/01/2021    ALT 17 06/01/2021    AST 37 (H) 06/01/2021    INR 2.5 06/02/2021    TSH 0.568 04/06/2021    LABA1C 5.6 04/26/2017    LABMICR <12.0 04/26/2017     No results for input(s): MG in the last 72 hours. Lab Results   Component Value Date    CALCIUM 8.4 (L) 06/01/2021    PHOS 2.4 (L) 04/28/2017        CT CHEST WO CONTRAST   Final Result   1. Diffuse patchy ground-glass opacities related to bilateral pneumonia. 2.  Mediastinal lymphadenopathy. 3.  Cholelithiasis. XR CHEST PORTABLE   Final Result   Cardiomegaly and pulmonary vascular congestion. Focal opacity in the left midlung.              Assessment    Principal Problem:    Pneumonia due to COVID-19 virus  Active Problems:    Hypothyroidism    JOSE on CPAP    HTN (hypertension)    PETRA (acute kidney

## 2021-06-02 NOTE — PROGRESS NOTES
Comprehensive Nutrition Assessment    Type and Reason for Visit:  Initial    Nutrition Recommendations/Plan: Continue current diet to meet nutritional needs. Nutrition Assessment:  Pt adm with Pneumonia due to COVID-19 virus. Poor PO intake and appetite prior to adm for a week. PMHx of hypothyroidism, JOSE,HTN, CKD stage 3. Malnutrition Assessment:  Malnutrition Status:  Insufficient data    Context:  Acute Illness     Findings of the 6 clinical characteristics of malnutrition:  Energy Intake:  Mild decrease in energy intake (Comment) (per pt (on phone) poor po and appetite for 1wk)  Weight Loss:  Unable to assess     Body Fat Loss:  Unable to assess (COVID isolation)     Muscle Mass Loss:  Unable to assess (COVID isolation)    Fluid Accumulation:  Unable to assess (COVID isolation)     Strength:  Not Performed    Estimated Daily Nutrient Needs:  Energy (kcal):  8151-4308 (10-11kcal/kg CBW); Weight Used for Energy Requirements:  Current     Protein (g):  70-80g (1.3-1.5g/kg IBW as tolerated d/t CKD stage 3); Weight Used for Protein Requirements:  Ideal        Fluid (ml/day):  7682-2180; Method Used for Fluid Requirements:  1 ml/kcal      Nutrition Related Findings:  A&Ox4, diarrhea, Abd rounded, -I/Os, no Edema      Wounds:  None (redness coccyx)       Current Nutrition Therapies:    DIET GENERAL;     Anthropometric Measures:  · Height: 5' 3\" (160 cm)  · Current Body Weight: 350 lb (158.8 kg) (5/31)   · Admission Body Weight: 350 lb (158.8 kg) (5/31 stated)    · Usual Body Weight:  (UTO per EMR)     · Ideal Body Weight: 115 lbs; % Ideal Body Weight 304.3 %   · BMI: 62      · BMI Categories: Obese Class 3 (BMI 40.0 or greater)       Nutrition Diagnosis:   · Increased nutrient needs related to impaired respiratory function (Pneumonia due to COVID-19 virus) as evidenced by intake 26-50%, poor intake prior to admission, diarrhea    Nutrition Interventions:   Food and/or Nutrient Delivery:  Continue Current

## 2021-06-03 PROBLEM — D68.2 HYPOPROTHROMBINEMIA (HCC): Status: ACTIVE | Noted: 2021-06-03

## 2021-06-03 LAB
ANION GAP SERPL CALCULATED.3IONS-SCNC: 9 MMOL/L (ref 7–16)
ANISOCYTOSIS: ABNORMAL
BASOPHILS ABSOLUTE: 0 E9/L (ref 0–0.2)
BASOPHILS RELATIVE PERCENT: 0 % (ref 0–2)
BUN BLDV-MCNC: 22 MG/DL (ref 6–20)
CALCIUM SERPL-MCNC: 8.6 MG/DL (ref 8.6–10.2)
CHLORIDE BLD-SCNC: 107 MMOL/L (ref 98–107)
CO2: 23 MMOL/L (ref 22–29)
CREAT SERPL-MCNC: 1.4 MG/DL (ref 0.5–1)
D DIMER: 499 NG/ML DDU
EOSINOPHILS ABSOLUTE: 0 E9/L (ref 0.05–0.5)
EOSINOPHILS RELATIVE PERCENT: 0 % (ref 0–6)
GFR AFRICAN AMERICAN: 47
GFR NON-AFRICAN AMERICAN: 38 ML/MIN/1.73
GLUCOSE BLD-MCNC: 132 MG/DL (ref 74–99)
HCT VFR BLD CALC: 37.7 % (ref 34–48)
HEMOGLOBIN: 12.3 G/DL (ref 11.5–15.5)
INR BLD: 3.2
LV EF: 58 %
LVEF MODALITY: NORMAL
LYMPHOCYTES ABSOLUTE: 0.75 E9/L (ref 1.5–4)
LYMPHOCYTES RELATIVE PERCENT: 15 % (ref 20–42)
MCH RBC QN AUTO: 27 PG (ref 26–35)
MCHC RBC AUTO-ENTMCNC: 32.6 % (ref 32–34.5)
MCV RBC AUTO: 82.9 FL (ref 80–99.9)
MONOCYTES ABSOLUTE: 0.15 E9/L (ref 0.1–0.95)
MONOCYTES RELATIVE PERCENT: 3 % (ref 2–12)
NEUTROPHILS ABSOLUTE: 4.1 E9/L (ref 1.8–7.3)
NEUTROPHILS RELATIVE PERCENT: 82 % (ref 43–80)
PDW BLD-RTO: 16.9 FL (ref 11.5–15)
PLATELET # BLD: 198 E9/L (ref 130–450)
PMV BLD AUTO: 11 FL (ref 7–12)
POTASSIUM SERPL-SCNC: 4.7 MMOL/L (ref 3.5–5)
PROTHROMBIN TIME: 34.4 SEC (ref 9.3–12.4)
RBC # BLD: 4.55 E12/L (ref 3.5–5.5)
SODIUM BLD-SCNC: 139 MMOL/L (ref 132–146)
TROPONIN, HIGH SENSITIVITY: 14 NG/L (ref 0–9)
TROPONIN, HIGH SENSITIVITY: 15 NG/L (ref 0–9)
WBC # BLD: 5 E9/L (ref 4.5–11.5)

## 2021-06-03 PROCEDURE — 6360000002 HC RX W HCPCS: Performed by: INTERNAL MEDICINE

## 2021-06-03 PROCEDURE — 1200000000 HC SEMI PRIVATE

## 2021-06-03 PROCEDURE — 6370000000 HC RX 637 (ALT 250 FOR IP): Performed by: INTERNAL MEDICINE

## 2021-06-03 PROCEDURE — 36415 COLL VENOUS BLD VENIPUNCTURE: CPT

## 2021-06-03 PROCEDURE — 93005 ELECTROCARDIOGRAM TRACING: CPT

## 2021-06-03 PROCEDURE — 97165 OT EVAL LOW COMPLEX 30 MIN: CPT

## 2021-06-03 PROCEDURE — 85025 COMPLETE CBC W/AUTO DIFF WBC: CPT

## 2021-06-03 PROCEDURE — 85610 PROTHROMBIN TIME: CPT

## 2021-06-03 PROCEDURE — 84484 ASSAY OF TROPONIN QUANT: CPT

## 2021-06-03 PROCEDURE — 80048 BASIC METABOLIC PNL TOTAL CA: CPT

## 2021-06-03 PROCEDURE — 2580000003 HC RX 258: Performed by: INTERNAL MEDICINE

## 2021-06-03 PROCEDURE — 94660 CPAP INITIATION&MGMT: CPT

## 2021-06-03 PROCEDURE — 93306 TTE W/DOPPLER COMPLETE: CPT

## 2021-06-03 PROCEDURE — 85378 FIBRIN DEGRADE SEMIQUANT: CPT

## 2021-06-03 RX ADMIN — LEVOTHYROXINE SODIUM 175 MCG: 125 TABLET ORAL at 06:26

## 2021-06-03 RX ADMIN — BUSPIRONE HYDROCHLORIDE 15 MG: 5 TABLET ORAL at 08:09

## 2021-06-03 RX ADMIN — WATER 1000 MG: 1 INJECTION INTRAMUSCULAR; INTRAVENOUS; SUBCUTANEOUS at 20:51

## 2021-06-03 RX ADMIN — BENZONATATE 100 MG: 100 CAPSULE ORAL at 13:57

## 2021-06-03 RX ADMIN — CITALOPRAM HYDROBROMIDE 40 MG: 20 TABLET ORAL at 08:08

## 2021-06-03 RX ADMIN — Medication 10 ML: at 20:52

## 2021-06-03 RX ADMIN — BUSPIRONE HYDROCHLORIDE 15 MG: 5 TABLET ORAL at 18:47

## 2021-06-03 RX ADMIN — SODIUM CHLORIDE, POTASSIUM CHLORIDE, SODIUM LACTATE AND CALCIUM CHLORIDE: 600; 310; 30; 20 INJECTION, SOLUTION INTRAVENOUS at 13:56

## 2021-06-03 RX ADMIN — ATORVASTATIN CALCIUM 20 MG: 20 TABLET, FILM COATED ORAL at 08:08

## 2021-06-03 RX ADMIN — DEXAMETHASONE SODIUM PHOSPHATE 6 MG: 4 INJECTION, SOLUTION INTRAMUSCULAR; INTRAVENOUS at 08:08

## 2021-06-03 ASSESSMENT — PAIN SCALES - GENERAL: PAINLEVEL_OUTOF10: 0

## 2021-06-03 NOTE — PROGRESS NOTES
Occupational Therapy  OCCUPATIONAL THERAPY INITIAL EVALUATION      Date:6/3/2021  Patient Name: Jennifer Salinas  MRN: [id number]  : 1960  Room: [address] [place] [address] M.D. [facility] [place], [place]          Date:6/3/2021                                                  Patient Name: Jennifer Salinas    MRN: [id number]    : 1960    Room: [address] [place] [address]      Evaluating OT: Jeane Terrell OTR/L   [id number]      Referring Ayush MD Meghan    Specific Provider Orders/Date:OT eval and treat 2021      Diagnosis: Pneumonia   COVID 19    Pertinent Medical History: OA, lymphedema, CVA, R jessica,  Neuropathy    Precautions:  Fall Risk, DROPLET PLUS     Assessment of current deficits    [x] Functional mobility  [x]ADLs  [x] Strength               []Cognition    [x] Functional transfers   [x] IADLs         [x] Safety Awareness   [x]Endurance    [x] Fine Coordination              [x] Balance      [] Vision/perception   [x]Sensation     []Gross Motor Coordination  [] ROM  [] Delirium                   [] Motor Control     OT PLAN OF CARE   OT POC based on physician orders, patient diagnosis and results of clinical assessment    Frequency/Duration  1-3days/wk for 2 weeks PRN   Specific OT Treatment Interventions to include:   [x]ADL retraining/adapted techniques and AE recommendations to increase functional independence within precautions                    [x]Energy conservation techniques to improve tolerance for selfcare routine   [x]Functional transfer/mobility training/DME recommendations for increased independence, safety and fall prevention         [x]Patient/family education to increase safety and functional independence             [x]Environmental modifications for safe mobility and completion of ADLs                             [x]Therapeutic activity to improve functional performance during ADLs. [x]Therapeutic exercise to improve tolerance and functional strength for ADLs   [x]Balance retraining/tolerance tasks for facilitation of postural control with dynamic challenges during ADLs . [x]Positioning to improve functional independence    Recommended Adaptive Equipment: TBD     Home Living: Pt lives her cat and dog , 1 story with ramp. Aide 7 days/week for 5 hours. Family assists as needed   Bathroom setup: walk in shower - primarily sponge bathes, fearful of falling    Equipment owned: walker, w/c, BSC, scooter, reacher, soc aide     Prior Level of Function: assist  with ADLs , assist  with IADLs; primarily uses w/c for mobility, ambulates with walker short distance into bathroom.   Scooter for community    Driving: transportation     Pain Level: no pain ;   Cognition: A&O: 4/4;    Memory:  Good    Sequencing:  Good    Problem solving:  Fair    Judgement/safety:  Fair      Functional Assessment:  AM-PAC Daily Activity Raw Score: 13/24   Initial Eval Status  Date: 6/4/21 Treatment Status  Date: STGs = LTGs  Time frame: 10-14 days   Feeding Independent      Grooming Daron ,seated     PTA:  Aide assisting with combing her hair   SBA    UB Dressing Mod A   Min A   LB Dressing Max A   Min A    Bathing Max A  Min A    Toileting Assist with hygiene      Bed Mobility  Min A  Long sit only   (able to actively move LEs in bed(    Patient preferred to bed left resting in bed - stated she just got repositioned and situated in bed   Therapist educated and reinforced importance of OOB activity     Patient stated she wants to be taken back into her house the way she came out (\"on a tarp\") she will be able to get around at home   CGA /SBA   Functional Transfers NT   Min A   Functional Mobility NT   PTA: primarily using w/c for mobility   Min A with good tolerance    Balance Sitting:     Static:  Daron -long sit     Dynamic:NT   Standing: NT   Supervision - sitting   Min A- standing

## 2021-06-03 NOTE — CARE COORDINATION
Social Work:    Received word from charge Abigail advising of possible discharge. Social work placed another call to [employer] and confirmed plans for home with [facility] vs snf. [employer] advised that she is fine to return home and requested an ambulance ride home. [employer] also requested social work contact [address] [place] [address] to see if Jacqueline Barrett will move her hospital bed purchased in 2016 from her bedroom to her living room. Social work placed a call to [employer] at [address] [place] [address] who advised that there is an $85.00 charge. Social work updated [employer] and provided her with a phone number to call [employer] back at to schedule and pay via credit card. Social work inquired about [employer] obtaining a new bed as [employer] also inquired about obtaining a new mattress and [facility] no longer supplies the bariatric beds. Social work called -[facility] and was advised that [employer] can obtain a new bed after 5 yrs. Social service notified Sarah at [facility] of expected discharge home today and prompted charge RN for home care orders. Social service also called Emily's home aide service, [facility], at [phone] ext. [phone] and notified them of expected discharge home today. Physician ambulance to be arranged once discharge is finalized.       Electronically signed by MONSE Burden on 6/3/2021 at 12:58 PM

## 2021-06-03 NOTE — PROGRESS NOTES
Date: 6/3/2021    Time: 0020    Patient Placed On BIPAP/CPAP/ Non-Invasive Ventilation? No    If no must comment. Facial area red/color change? If YES are Blister/Lesion present? If yes must notify nursing staff  BIPAP/CPAP skin barrier?       Skin barrier type:       Comments:    Red outlet already on    Toll BETTINA Mishra

## 2021-06-03 NOTE — PROGRESS NOTES
Subjective:  Feeling better   No CP or SOB  No fever or chills   No uncontrolled pain  No vomiting or diarrhea     6/3/2021-patient laying quietly in bed; no chest pain no dyspnea. Has not required supplemental oxygen. She is concerned regarding bradycardia which was not present on admission. She would like Tessalon Perles. Afebrile last 24 hours. Blood pressure 140/68; pulse 48. Has been as low as 42 overnight. 92% saturation on room air; has been on BiPAP overnight. Intake and output not available. WBC 5.0 hemoglobin 12.3 TSH 0.026 slightly suppressed. INR from today 3.2; 2.5 yesterday; she received 1 mg of warfarin. Pharmacy dosing warfarin and will hold dose for today. D-dimer from yesterday 499. EKG from today confirms sinus bradycardia with ventricular rate of 47. Stool for C. difficile has been negative. Physical therapy AMPAC score from yesterday 8/24. Social service note from today appreciated. Plan is for home with home health care. Home health care orders entered.       Objective:    BP (!) 140/68   Pulse (!) 48   Temp 97.1 °F (36.2 °C) (Oral)   Resp 18   Ht 5' 3\" (1.6 m)   Wt (!) 350 lb (158.8 kg)   SpO2 92%   BMI 62.00 kg/m²     24HR INTAKE/OUTPUT:    No intake or output data in the 24 hours ending 06/03/21 1306    General appearance: NAD, conversant morbidly obese  Neck: FROM, supple   Lungs: Clear bilaterally no wheezes, no rhonchi, no crackles  CV: Bradycardia at 48/min; no murmur gallop or rub; neck without Bruits  Abdomen: Soft, non-tender; no masses or HSM  Extremities: Bilateral lymphedema right worse than left  Skin: Significant stasis dermatitis changes right lower leg/ankle  Psych: Alert and oriented normal affect  Neuro: Weakness right leg and minimally right arm previous CVA      Most Recent Labs  Lab Results   Component Value Date    WBC 5.0 06/03/2021    HGB 12.3 06/03/2021    HCT 37.7 06/03/2021     06/03/2021     06/03/2021    K 4.7 06/03/2021    CL 107 06/03/2021    CREATININE 1.4 (H) 06/03/2021    BUN 22 (H) 06/03/2021    CO2 23 06/03/2021    GLUCOSE 132 (H) 06/03/2021    ALT 17 06/01/2021    AST 37 (H) 06/01/2021    INR 3.2 06/03/2021    TSH 0.026 (L) 06/02/2021    LABA1C 5.6 04/26/2017    LABMICR <12.0 04/26/2017     No results for input(s): MG in the last 72 hours. Lab Results   Component Value Date    CALCIUM 8.6 06/03/2021    PHOS 2.4 (L) 04/28/2017        CT CHEST WO CONTRAST   Final Result   1. Diffuse patchy ground-glass opacities related to bilateral pneumonia. 2.  Mediastinal lymphadenopathy. 3.  Cholelithiasis. XR CHEST PORTABLE   Final Result   Cardiomegaly and pulmonary vascular congestion. Focal opacity in the left midlung. Assessment    Principal Problem:    Pneumonia due to COVID-19 virus  Active Problems:    Hypothyroidism    JOSE on CPAP    HTN (hypertension)    PETRA (acute kidney injury) (HCC)    PFO with atrial septal aneurysm    Rt Hemiparesis following cerebrovascular accident (CVA) (HCC)    Hemiparesis affecting right side as late effect of cerebrovascular accident (CVA) ([facility]    CKD (chronic kidney disease) stage 3, GFR 30-59 ml/min    Morbid obesity with BMI of 60.0-69.9, adult ([facility]    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 *      Plan:  63year old female presenting with fever, chills, fatigue and cough.        COVID-19 PNA -IV decadron  -remdesivir per pharmacy  -monitor D.  Dimer and CRP  -monitor O2 sats closely  -tessalon for cough     UTI  UCx  -IV Rocphin     Hx CVA from PFO  -coumadin per pharmacy-supratherapeutic INR -  -continue statin     Coumadin coagulopathy  INR 6.7 on admission  Hold and monitor    Hypothyroidism  -continue synthroid  -TSH     PETRA complicating CKD 3  Improving with IV fluids  Avoid nephrotoxins  Monitor     JOSE  dscd import of CPAP compliance  Medications for other co morbidities cont as appropriate w dosage adjustments as

## 2021-06-03 NOTE — PROGRESS NOTES
Date: 6/2/2021    Time: 10:31 PM    Patient Placed On BIPAP/CPAP/ Non-Invasive Ventilation?   No    If no must comment - found pt on cpap      Enrico Fisher RCP

## 2021-06-03 NOTE — PLAN OF CARE
Problem: Airway Clearance - Ineffective  Goal: Achieve or maintain patent airway  Outcome: Met This Shift     Problem: Gas Exchange - Impaired  Goal: Absence of hypoxia  Outcome: Met This Shift  Goal: Promote optimal lung function  Outcome: Met This Shift     Problem: Breathing Pattern - Ineffective  Goal: Ability to achieve and maintain a regular respiratory rate  Outcome: Met This Shift     Problem:  Body Temperature -  Risk of, Imbalanced  Goal: Ability to maintain a body temperature within defined limits  Outcome: Met This Shift  Goal: Will regain or maintain usual level of consciousness  Outcome: Met This Shift  Goal: Complications related to the disease process, condition or treatment will be avoided or minimized  Outcome: Met This Shift     Problem: Isolation Precautions - Risk of Spread of Infection  Goal: Prevent transmission of infection  Outcome: Met This Shift     Problem: Nutrition Deficits  Goal: Optimize nutritional status  Outcome: Met This Shift     Problem: Risk for Fluid Volume Deficit  Goal: Maintain normal heart rhythm  Outcome: Met This Shift  Goal: Maintain absence of muscle cramping  Outcome: Met This Shift  Goal: Maintain normal serum potassium, sodium, calcium, phosphorus, and pH  Outcome: Met This Shift     Problem: Patient Education: Go to Patient Education Activity  Goal: Patient/Family Education  Outcome: Met This Shift     Problem: Skin Integrity:  Goal: Will show no infection signs and symptoms  Description: Will show no infection signs and symptoms  Outcome: Met This Shift  Goal: Absence of new skin breakdown  Description: Absence of new skin breakdown  Outcome: Met This Shift     Problem: Falls - Risk of:  Goal: Will remain free from falls  Description: Will remain free from falls  Outcome: Met This Shift  Goal: Absence of physical injury  Description: Absence of physical injury  Outcome: Met This Shift

## 2021-06-03 NOTE — PROGRESS NOTES
Pharmacy Consultation Note  (Warfarin Dosing and Monitoring)    Initial consult date: 6/1  Consulting physician: Margie Archer    Allergies:  Pcn [penicillins] and Penicillin g    63 y.o. female    Ht Readings from Last 1 Encounters:   06/02/21 5' 3\" (1.6 m)     Wt Readings from Last 1 Encounters:   05/31/21 (!) 350 lb (158.8 kg)         Warfarin Indication Target   INR Range Home Dose  (if applicable) Diet/Feeding Tube   (Enteral feeds, nutritional drinks, increased Vitamin K in diet can decrease INR)   H/o CVA 2-3 Recent changes by PCP: 5mg daily from 2.5mg TRS, 5mg all others General       x Home Med? Meds Increasing INR x Home Med?  Meds Decreasing INR     Allopurinol    Azathioprine     Amiodarone/Propafenone/Dronedarone   Carbamazepine     Androgens   Cholestyramine     Chemotherapy (BBW: Capecitabine)   Estrogen     Ciprofloxacin/Levofloxacin   Nafcillin/Dicloxacillin     Clarithromycin/Erythromycin/Azithromycin   Barbiturates      Fluconazole/Itraconazole/Voriconazole/Ketoconazole   Phenytoin (Variable)     Metronidazole   Rifampin     Phenytoin (Variable)   Steroids (Variable/Dose Dependent)   X   Statins/Fenofibrate/Gemfibrozil   Sucralfate     Steroids (Variable/Dose Dependent)   Other:     Sulfamethoxazole/Trimethoprim        Tramadol         Other:        Comments regarding medication interactions:      x Diseases Affecting INR x Increased Bleeding Risk    CHF Exacerbation (Increases)  History GI Bleed/PUD    Liver Disease (Increases)  Chronic NSAID Use    Thyroid: Hyper (Increases)  Hypo (Decreases)  Chronic ASA/Antiplatelet Use (Clopidogrel/ Dipyridamole/Prasugrel/Ticagrelor)      Malignancy (Increases)  Abnormal Renal Function (dialysis, renal transplant, SCr ? 2.3 mg/dL)     History of EtOH Abuse: Acute (Increases)   Chronic (Decreases)  Liver Function (cirrhosis, bilirubin >2x ULN with AST/ALT/AP >3x ULN)   X Fever (Increases)  Age > 65 years   X Acute infection (Increases)  Hypertension/Uncontrolled BP   X Diarrhea/Dehydration (Increases)  History of stroke    Other: __________________  Other:___________________       Vitamin K or Blood product  Administration Date    Vit K 2.5mg PO x1 5/31              TSH:    Lab Results   Component Value Date    TSH 0.026 06/02/2021        Hepatic Function Panel:                            Lab Results   Component Value Date    ALKPHOS 64 06/01/2021    ALT 17 06/01/2021    AST 37 06/01/2021    PROT 7.1 06/01/2021    BILITOT 0.3 06/01/2021    BILIDIR <0.2 04/28/2017    IBILI 0.1 04/28/2017    LABALBU 2.9 06/01/2021    LABALBU 4.2 05/24/2012       Date Warfarin Dose INR Heparin or LMWH HGB/HCT PLT Comment   5/31 HOLD 7.5 -- 12.8/39.5 135 Vit K 2.5mg PO x1   6/1 HOLD 6.7 -- 12.3/39.1 130    6/2 3.5mg 2.5 -- 12.4/37.8 169    6/3 HOLD 3.2 -- 12.3/37.7 198               Assessment and Plan:  · Patient is a 62 yo female presents with COVID   · Patient is on warfarin for hx of CVA   · Goal INR 2-3  · INR 3.2 today; surprising as patent only received 1 dose of warfarin  · Will hold warfarin tonight  · Daily PT/INR until the INR is stable within the therapeutic range  · Pharmacist will follow and monitor/adjust dosing as necessary    Christie Davison, DimpleD, BCCCP  6/3/2021  1:04 PM

## 2021-06-03 NOTE — PROGRESS NOTES
Spoke to Dr. Frazier Brochure in person re: patient status, will monitor patient overnight, possible discharge tomorrow.

## 2021-06-04 LAB
ANION GAP SERPL CALCULATED.3IONS-SCNC: 7 MMOL/L (ref 7–16)
ANISOCYTOSIS: ABNORMAL
ATYPICAL LYMPHOCYTE RELATIVE PERCENT: 0.9 % (ref 0–4)
BASOPHILS ABSOLUTE: 0 E9/L (ref 0–0.2)
BASOPHILS RELATIVE PERCENT: 0 % (ref 0–2)
BUN BLDV-MCNC: 25 MG/DL (ref 6–20)
CALCIUM IONIZED: 1.22 MMOL/L (ref 1.15–1.33)
CALCIUM SERPL-MCNC: 8.7 MG/DL (ref 8.6–10.2)
CHLORIDE BLD-SCNC: 104 MMOL/L (ref 98–107)
CO2: 25 MMOL/L (ref 22–29)
CREAT SERPL-MCNC: 1.4 MG/DL (ref 0.5–1)
D DIMER: 230 NG/ML DDU
EOSINOPHILS ABSOLUTE: 0 E9/L (ref 0.05–0.5)
EOSINOPHILS RELATIVE PERCENT: 0 % (ref 0–6)
GFR AFRICAN AMERICAN: 47
GFR NON-AFRICAN AMERICAN: 38 ML/MIN/1.73
GLUCOSE BLD-MCNC: 122 MG/DL (ref 74–99)
HBA1C MFR BLD: 5.9 % (ref 4–5.6)
HCT VFR BLD CALC: 37.2 % (ref 34–48)
HEMOGLOBIN: 12.1 G/DL (ref 11.5–15.5)
INR BLD: 4
LYMPHOCYTES ABSOLUTE: 0.65 E9/L (ref 1.5–4)
LYMPHOCYTES RELATIVE PERCENT: 10.5 % (ref 20–42)
MAGNESIUM: 1.9 MG/DL (ref 1.6–2.6)
MCH RBC QN AUTO: 27.1 PG (ref 26–35)
MCHC RBC AUTO-ENTMCNC: 32.5 % (ref 32–34.5)
MCV RBC AUTO: 83.4 FL (ref 80–99.9)
MONOCYTES ABSOLUTE: 0.18 E9/L (ref 0.1–0.95)
MONOCYTES RELATIVE PERCENT: 2.6 % (ref 2–12)
NEUTROPHILS ABSOLUTE: 5.07 E9/L (ref 1.8–7.3)
NEUTROPHILS RELATIVE PERCENT: 86 % (ref 43–80)
NUCLEATED RED BLOOD CELLS: 0 /100 WBC
OVALOCYTES: ABNORMAL
PDW BLD-RTO: 16.7 FL (ref 11.5–15)
PHOSPHORUS: 3 MG/DL (ref 2.5–4.5)
PLATELET # BLD: 210 E9/L (ref 130–450)
PMV BLD AUTO: 10.5 FL (ref 7–12)
POIKILOCYTES: ABNORMAL
POLYCHROMASIA: ABNORMAL
POTASSIUM SERPL-SCNC: 4.5 MMOL/L (ref 3.5–5)
PROTHROMBIN TIME: 43.5 SEC (ref 9.3–12.4)
RBC # BLD: 4.46 E12/L (ref 3.5–5.5)
SODIUM BLD-SCNC: 136 MMOL/L (ref 132–146)
T4 FREE: <0.1 NG/DL (ref 0.93–1.7)
WBC # BLD: 5.9 E9/L (ref 4.5–11.5)

## 2021-06-04 PROCEDURE — 36415 COLL VENOUS BLD VENIPUNCTURE: CPT

## 2021-06-04 PROCEDURE — 82330 ASSAY OF CALCIUM: CPT

## 2021-06-04 PROCEDURE — 80048 BASIC METABOLIC PNL TOTAL CA: CPT

## 2021-06-04 PROCEDURE — 6360000002 HC RX W HCPCS: Performed by: INTERNAL MEDICINE

## 2021-06-04 PROCEDURE — 84100 ASSAY OF PHOSPHORUS: CPT

## 2021-06-04 PROCEDURE — 84439 ASSAY OF FREE THYROXINE: CPT

## 2021-06-04 PROCEDURE — 2580000003 HC RX 258: Performed by: INTERNAL MEDICINE

## 2021-06-04 PROCEDURE — 94660 CPAP INITIATION&MGMT: CPT

## 2021-06-04 PROCEDURE — 85610 PROTHROMBIN TIME: CPT

## 2021-06-04 PROCEDURE — 83036 HEMOGLOBIN GLYCOSYLATED A1C: CPT

## 2021-06-04 PROCEDURE — 85378 FIBRIN DEGRADE SEMIQUANT: CPT

## 2021-06-04 PROCEDURE — 85025 COMPLETE CBC W/AUTO DIFF WBC: CPT

## 2021-06-04 PROCEDURE — 6370000000 HC RX 637 (ALT 250 FOR IP): Performed by: INTERNAL MEDICINE

## 2021-06-04 PROCEDURE — 97530 THERAPEUTIC ACTIVITIES: CPT

## 2021-06-04 PROCEDURE — 93005 ELECTROCARDIOGRAM TRACING: CPT

## 2021-06-04 PROCEDURE — 83735 ASSAY OF MAGNESIUM: CPT

## 2021-06-04 PROCEDURE — 1200000000 HC SEMI PRIVATE

## 2021-06-04 RX ORDER — DEXAMETHASONE 6 MG/1
6 TABLET ORAL
Qty: 5 TABLET | Refills: 0 | Status: SHIPPED
Start: 2021-06-04 | End: 2021-06-09 | Stop reason: HOSPADM

## 2021-06-04 RX ORDER — WARFARIN SODIUM 2.5 MG/1
TABLET ORAL
Qty: 90 TABLET | Refills: 0 | Status: SHIPPED
Start: 2021-06-04 | End: 2021-06-15 | Stop reason: SDUPTHER

## 2021-06-04 RX ADMIN — BUSPIRONE HYDROCHLORIDE 15 MG: 5 TABLET ORAL at 18:04

## 2021-06-04 RX ADMIN — Medication 10 ML: at 09:37

## 2021-06-04 RX ADMIN — Medication 10 ML: at 18:07

## 2021-06-04 RX ADMIN — BENZONATATE 100 MG: 100 CAPSULE ORAL at 09:37

## 2021-06-04 RX ADMIN — ATORVASTATIN CALCIUM 20 MG: 20 TABLET, FILM COATED ORAL at 09:37

## 2021-06-04 RX ADMIN — LEVOTHYROXINE SODIUM 175 MCG: 125 TABLET ORAL at 05:18

## 2021-06-04 RX ADMIN — SODIUM CHLORIDE, POTASSIUM CHLORIDE, SODIUM LACTATE AND CALCIUM CHLORIDE: 600; 310; 30; 20 INJECTION, SOLUTION INTRAVENOUS at 00:21

## 2021-06-04 RX ADMIN — BUSPIRONE HYDROCHLORIDE 15 MG: 5 TABLET ORAL at 09:37

## 2021-06-04 RX ADMIN — CITALOPRAM HYDROBROMIDE 40 MG: 20 TABLET ORAL at 09:37

## 2021-06-04 RX ADMIN — DEXAMETHASONE SODIUM PHOSPHATE 6 MG: 4 INJECTION, SOLUTION INTRAMUSCULAR; INTRAVENOUS at 09:37

## 2021-06-04 ASSESSMENT — PAIN SCALES - GENERAL
PAINLEVEL_OUTOF10: 0

## 2021-06-04 NOTE — PROGRESS NOTES
Physical Therapy    Facility/Department: [address] INTERMEDIATE  Treatment note    NAME: Guerda Whatley  : 1960  MRN: [id number]    Date of Service: 2021      Patient Diagnosis(es): The encounter diagnosis was Rt Hemiparesis following cerebrovascular accident (CVA) ([facility] has a past medical history of Cerebral artery occlusion with cerebral infarction ([facility] Depression, FLAVIO (generalized anxiety disorder), Hypertension, Hypoprothrombinemia ([facility] Hypothyroidism, Lymphedema of both lower extremities, Morbid obesity with BMI of 60.0-69.9, adult ([facility] Neuropathy, JOSE on CPAP, Osteoarthritis, PFO with atrial septal aneurysm, Pneumonia due to COVID-19 virus, Rt Hemiparesis following cerebrovascular accident (CVA) ([facility] TIA (transient ischemic attack), Varicose veins of left leg with edema, Varicose veins of right leg with edema, and Venous insufficiency of both lower extremities. has a past surgical history that includes Total knee arthroplasty (2008); Tonsillectomy; Finger trigger release; Dilation and curettage of uterus (N/A, 2017); other surgical history (N/A, 2017); joint replacement (Bilateral, 2008); and Endometrial biopsy (N/A, 2018). Referring Provider:  Giig Patel MD      Evaluating Therapist: Jaz Vital PT      Room #:629  DIAGNOSIS: Pneumonia  Additional Pertinent History:CVA with R side weakness  PRECAUTIONS: falls, droplet plus isolaiton    Social:  Pt lives alone in a 1 floor plan has caregiver 5 hrs a day that assist with cleaning, meals, bathing. Prior to admission Pt non-ambulatory. States transfers to w/c independnetly     Initial Evaluation  Date: 21 Treatment   21   Short Term/ Long Term   Goals   Was pt agreeable to Eval/treatment? yes yes    Does pt have pain? Pain R UE and LE, chronic from CVA No complaints    Bed Mobility  Rolling: mod assist  Supine to sit: mod assist  Sit to supine: mod assist  Scooting: mod assist Rolling:  Mod A  Supine to sit: Mod A  Sit to supine: Mod A LE support  Scooting: Mod A seated to EOB Min assist   Transfers Sit to stand: NT  Stand to sit: NT  Stand pivot: NT Sit to stand; Min A  Stand to sit: Min A  Stand Pivot; NT Mod assist   Ambulation    NT NT. See comments N/A   Stair Negotiation  Ascended and descended  NT   N/A   LE strength     3+/5    4-/5   balance     Initial sitting balance min assist, progressed to SBA     AM-PAC Raw score               8/24 10/24          Pt is alert and able to follow instruction  Balance: poor standing using [employer] for support    Pt performed therapeutic exercise of the following: NA    Patient education  Pt was educated on UE usage to assist with transfers, upright standing posture    Patient response to education:   Pt verbalized understanding Pt demonstrated skill Pt requires further education in this area   yes With instruction yes     ASSESSMENT:   Comments: Pt found in bed, agreed to rx, was assisted with donning shoes and socks. Pt assisted to EOB, sat EOB SBA. Sit to stand transfers performed x 4, Pt stood approx 15 to 20 seconds each attempt, 1 side step performed to the R each standing attempt R LE only, Pt unable to advance L LE. Pt then able to make her way up the EOB a minimal amount each standing attempt. Pt then assisted back to bed, was able to position herself in bed with minimal assist for LEs. Pt states felt she could do better if she had her glasses present. Pt states will have her glasses once in a rehab setting.   Treatment: Pt practiced and was instructed in the following treatment: transfer safety, standing posture    Pt was left in bed with call light in reach    Time in 1532   Time out 1557   Total Treatment Time 25 minutes   CPT codes:     Therapeutic activities 69564 25 minutes   Therapeutic exercises 55243 0 minutes       Pt is making good progress toward established Physical Therapy goals as per standing tolerance and brief functional mobility performed this

## 2021-06-04 NOTE — CARE COORDINATION
Social Work:    Social service placed a follow-up call to Noel John and discussed her therapy scores, as well as discussed plans for home vs snf. Jeanette Faby advised that [facility] moved her bed into her living room today and she has family support, as well as aide service, and [address], therefore, she feels fine returning home rather than to skilled rehab. Noel John was advised to contact her aide service and advise them of possible discharge today vs on the weekend. Social service did leave a message yesterday. Sarah at [address] is following. Physician ambulance will need arranged for home when discharge.     Electronically signed by MONSE Mcconnell on 6/4/2021 at 12:20 PM

## 2021-06-04 NOTE — PLAN OF CARE
Problem: Airway Clearance - Ineffective  Goal: Achieve or maintain patent airway  Outcome: Met This Shift     Problem: Gas Exchange - Impaired  Goal: Absence of hypoxia  Outcome: Met This Shift  Goal: Promote optimal lung function  Outcome: Met This Shift     Problem: Breathing Pattern - Ineffective  Goal: Ability to achieve and maintain a regular respiratory rate  Outcome: Met This Shift     Problem:  Body Temperature -  Risk of, Imbalanced  Goal: Ability to maintain a body temperature within defined limits  Outcome: Met This Shift  Goal: Will regain or maintain usual level of consciousness  Outcome: Met This Shift  Goal: Complications related to the disease process, condition or treatment will be avoided or minimized  Outcome: Met This Shift     Problem: Isolation Precautions - Risk of Spread of Infection  Goal: Prevent transmission of infection  Outcome: Met This Shift     Problem: Nutrition Deficits  Goal: Optimize nutritional status  Outcome: Met This Shift     Problem: Risk for Fluid Volume Deficit  Goal: Maintain normal heart rhythm  Outcome: Met This Shift  Goal: Maintain absence of muscle cramping  Outcome: Met This Shift  Goal: Maintain normal serum potassium, sodium, calcium, phosphorus, and pH  Outcome: Met This Shift     Problem: Loneliness or Risk for Loneliness  Goal: Demonstrate positive use of time alone when socialization is not possible  Outcome: Met This Shift

## 2021-06-04 NOTE — PROGRESS NOTES
Subjective:  Feeling better   No CP or SOB  No fever or chills   No uncontrolled pain  No vomiting or diarrhea     Objective:    BP (!) 164/70   Pulse (!) 42   Temp 98 °F (36.7 °C) (Oral)   Resp 18   Ht 5' 3\" (1.6 m)   Wt (!) 350 lb (158.8 kg)   SpO2 93%   BMI 62.00 kg/m²     24HR INTAKE/OUTPUT:      Intake/Output Summary (Last 24 hours) at 6/4/2021 1007  Last data filed at 6/4/2021 0021  Gross per 24 hour   Intake --   Output 700 ml   Net -700 ml       General appearance: NAD, conversant obese  Neck: FROM, supple   Lungs: Clear bilaterally no wheezes, no rhonchi, no crackles  CV: RRR, no MRGs; normal carotid upstroke and amplitude without Bruits  Abdomen: Soft, non-tender; no masses or HSM  Extremities: No edema, no cyanosis, no clubbing  Skin: Intact no rash, no lesions, no ulcers    Psych: Alert and oriented normal affect  Neuro: Nonfocal  Most Recent Labs  Lab Results   Component Value Date    WBC 5.9 06/04/2021    HGB 12.1 06/04/2021    HCT 37.2 06/04/2021     06/04/2021     06/04/2021    K 4.5 06/04/2021     06/04/2021    CREATININE 1.4 (H) 06/04/2021    BUN 25 (H) 06/04/2021    CO2 25 06/04/2021    GLUCOSE 122 (H) 06/04/2021    ALT 17 06/01/2021    AST 37 (H) 06/01/2021    INR 4.0 06/04/2021    TSH 0.026 (L) 06/02/2021    LABA1C 5.9 (H) 06/04/2021    LABMICR <12.0 04/26/2017     Recent Labs     06/04/21  0530   MG 1.9     Lab Results   Component Value Date    CALCIUM 8.7 06/04/2021    PHOS 3.0 06/04/2021        CT CHEST WO CONTRAST   Final Result   1. Diffuse patchy ground-glass opacities related to bilateral pneumonia. 2.  Mediastinal lymphadenopathy. 3.  Cholelithiasis. XR CHEST PORTABLE   Final Result   Cardiomegaly and pulmonary vascular congestion. Focal opacity in the left midlung.              Assessment    Principal Problem:    Pneumonia due to COVID-19 virus  Active Problems:    Hypothyroidism    JOSE on CPAP    HTN (hypertension)    PETRA (acute kidney injury) ([facility] [address]    PFO with atrial septal aneurysm    Rt Hemiparesis following cerebrovascular accident (CVA) ([facility] [address]    Hemiparesis affecting right side as late effect of cerebrovascular accident (CVA) ([facility] [address]    CKD (chronic kidney disease) stage 3, GFR 30-59 ml/min    Morbid obesity with BMI of 60.0-69.9, adult ([facility] [address]    Hypoprothrombinemia ([facility] [address]  Resolved Problems:    * No resolved hospital problems. *      Plan:  63year old female presenting with fever, chills, fatigue and cough.        COVID-19 PNA -IV decadron  -remdesivir per pharmacy  -monitor D.  Dimer and CRP  -monitor O2 sats closely  -tessalon for cough     UTI  UCx polymicrobial  -IV Rocphin completed     Hx CVA from PFO  -coumadin per pharmacy-supratherapeutic INR -  -continue statin     Coumadin coagulopathy  INR 6.7 on admission  Hold and monitor    Hypothyroidism   -cont synthroid  -TSH low, free T4 nml      PETRA complicating CKD 3  Improving with IV fluids- stopped  Avoid nephrotoxins  Monitor     JOSE  dscd import of CPAP compliance  Medications for other co morbidities cont as appropriate w dosage adjustments as necessary    Bradycardia-asymptomatic  No AV blocking agents  Baclofen is ordered as needed but has not been taken  Sinus bradycardia on EKG  Cardiology consulted by overnight provider     PT/OT  DVT PPx  DC planning likely today w [facility]       Electronically signed by Chica Garcia MD on 6/4/2021 at 10:07 AM

## 2021-06-04 NOTE — PLAN OF CARE
Problem: Airway Clearance - Ineffective  Goal: Achieve or maintain patent airway  Outcome: Met This Shift     Problem: Gas Exchange - Impaired  Goal: Absence of hypoxia  Outcome: Met This Shift  Goal: Promote optimal lung function  Outcome: Met This Shift     Problem: Breathing Pattern - Ineffective  Goal: Ability to achieve and maintain a regular respiratory rate  Outcome: Met This Shift     Problem:  Body Temperature -  Risk of, Imbalanced  Goal: Ability to maintain a body temperature within defined limits  Outcome: Met This Shift  Goal: Will regain or maintain usual level of consciousness  Outcome: Met This Shift  Goal: Complications related to the disease process, condition or treatment will be avoided or minimized  Outcome: Met This Shift     Problem: Isolation Precautions - Risk of Spread of Infection  Goal: Prevent transmission of infection  Outcome: Met This Shift     Problem: Nutrition Deficits  Goal: Optimize nutritional status  Outcome: Met This Shift     Problem: Risk for Fluid Volume Deficit  Goal: Maintain normal heart rhythm  Outcome: Met This Shift  Goal: Maintain absence of muscle cramping  Outcome: Met This Shift  Goal: Maintain normal serum potassium, sodium, calcium, phosphorus, and pH  Outcome: Met This Shift     Problem: Loneliness or Risk for Loneliness  Goal: Demonstrate positive use of time alone when socialization is not possible  Outcome: Met This Shift     Problem: Fatigue  Goal: Verbalize increase energy and improved vitality  Outcome: Met This Shift     Problem: Patient Education: Go to Patient Education Activity  Goal: Patient/Family Education  Outcome: Met This Shift     Problem: Skin Integrity:  Goal: Will show no infection signs and symptoms  Description: Will show no infection signs and symptoms  Outcome: Met This Shift  Goal: Absence of new skin breakdown  Description: Absence of new skin breakdown  Outcome: Met This Shift     Problem: Falls - Risk of:  Goal: Will remain free from falls  Description: Will remain free from falls  Outcome: Met This Shift  Goal: Absence of physical injury  Description: Absence of physical injury  Outcome: Met This Shift

## 2021-06-04 NOTE — CARE COORDINATION
Social Work:    Received a call from Jeronimo Munoz, charge RN, advising that patient just learned that her aide service cannot start care until she is out of [facility]. Social service placed a call to Beverley Kim and advised her that we will need to transfer to either [facility] or [facility], the only two facilities that are accepting Covid+ patients. Beverley Kim placed her mom on speaker phone and they both requested [facility], as [facility] states that they know her there. Social work reviewed chart notes and prompted for updated PT notes. Social work advised Beverley Kim that she may not qualify for acute rehab due to PT score on 6-2 but Beverley Kim was adamant that she will work hard and prefers [facility]. A call was placed to ADRYAN at [address] [place] [address], Rylie Pruett at [facility], and Frederick at [employer]. [facility] has no bed opens, Indira at [facility] accepted the referral and is starting auth ASAP. [facility] advised that they could possibly obtain auth on the weekend. Christine at [facility] is following in case patient is denied acute rehab.     Electronically signed by MONSE Wilkerson on 6/4/2021 at 3:16 PM

## 2021-06-04 NOTE — PROGRESS NOTES
Pharmacy Consultation Note  (Warfarin Dosing and Monitoring)    Initial consult date: 6/1  Consulting physician: Alan Angel    Allergies:  Pcn [penicillins] and Penicillin g    63 y.o. female    Ht Readings from Last 1 Encounters:   06/02/21 5' 3\" (1.6 m)     Wt Readings from Last 1 Encounters:   05/31/21 (!) 350 lb (158.8 kg)         Warfarin Indication Target   INR Range Home Dose  (if applicable) Diet/Feeding Tube   (Enteral feeds, nutritional drinks, increased Vitamin K in diet can decrease INR)   H/o CVA 2-3 Recent changes by PCP: 5mg daily from 2.5mg TRS, 5mg all others General       x Home Med? Meds Increasing INR x Home Med?  Meds Decreasing INR     Allopurinol    Azathioprine     Amiodarone/Propafenone/Dronedarone   Carbamazepine     Androgens   Cholestyramine     Chemotherapy (BBW: Capecitabine)   Estrogen     Ciprofloxacin/Levofloxacin   Nafcillin/Dicloxacillin     Clarithromycin/Erythromycin/Azithromycin   Barbiturates      Fluconazole/Itraconazole/Voriconazole/Ketoconazole   Phenytoin (Variable)     Metronidazole   Rifampin     Phenytoin (Variable)   Steroids (Variable/Dose Dependent)   X   Statins/Fenofibrate/Gemfibrozil   Sucralfate     Steroids (Variable/Dose Dependent)   Other:     Sulfamethoxazole/Trimethoprim        Tramadol         Other:        Comments regarding medication interactions:      x Diseases Affecting INR x Increased Bleeding Risk    CHF Exacerbation (Increases)  History GI Bleed/PUD    Liver Disease (Increases)  Chronic NSAID Use    Thyroid: Hyper (Increases)  Hypo (Decreases)  Chronic ASA/Antiplatelet Use (Clopidogrel/ Dipyridamole/Prasugrel/Ticagrelor)      Malignancy (Increases)  Abnormal Renal Function (dialysis, renal transplant, SCr ? 2.3 mg/dL)     History of EtOH Abuse: Acute (Increases)   Chronic (Decreases)  Liver Function (cirrhosis, bilirubin >2x ULN with AST/ALT/AP >3x ULN)   X Fever (Increases)  Age > 65 years   X Acute infection (Increases)  Hypertension/Uncontrolled BP   X Diarrhea/Dehydration (Increases)  History of stroke    Other: __________________  Other:___________________       Vitamin K or Blood product  Administration Date    Vit K 2.5mg PO x1 5/31              TSH:    Lab Results   Component Value Date    TSH 0.026 06/02/2021        Hepatic Function Panel:                            Lab Results   Component Value Date    ALKPHOS 64 06/01/2021    ALT 17 06/01/2021    AST 37 06/01/2021    PROT 7.1 06/01/2021    BILITOT 0.3 06/01/2021    BILIDIR <0.2 04/28/2017    IBILI 0.1 04/28/2017    LABALBU 2.9 06/01/2021    LABALBU 4.2 05/24/2012       Date Warfarin Dose INR Heparin or LMWH HGB/HCT PLT Comment   5/31 HOLD 7.5 -- 12.8/39.5 135 Vit K 2.5mg PO x1   6/1 HOLD 6.7 -- 12.3/39.1 130    6/2 3.5mg 2.5 -- 12.4/37.8 169    6/3 HOLD 3.2 -- 12.3/37.7 198    6/4 HOLD 4 -- 12.1/37.2 210      Assessment and Plan:  · Patient is a 64 yo female presents with COVID   · Patient is on warfarin for hx of CVA   · Goal INR 2-3  · INR 4 today; surprising as patent only received 1 dose of warfarin (on 6/2)  · Will hold warfarin again tonight  · Daily PT/INR until the INR is stable within the therapeutic range  · Pharmacist will follow and monitor/adjust dosing as necessary    Ilda Jones, DimpleD, BCCCP  6/4/2021  11:43 AM

## 2021-06-04 NOTE — PROGRESS NOTES
Notified Drea Petit pt called out to nurses station, states her aide can not come to her residence until she no longer requires isolation.

## 2021-06-05 LAB
ANION GAP SERPL CALCULATED.3IONS-SCNC: 9 MMOL/L (ref 7–16)
ANISOCYTOSIS: ABNORMAL
BASOPHILS ABSOLUTE: 0 E9/L (ref 0–0.2)
BASOPHILS RELATIVE PERCENT: 0 % (ref 0–2)
BLOOD CULTURE, ROUTINE: NORMAL
BUN BLDV-MCNC: 27 MG/DL (ref 6–20)
BURR CELLS: ABNORMAL
CALCIUM SERPL-MCNC: 8.6 MG/DL (ref 8.6–10.2)
CHLORIDE BLD-SCNC: 106 MMOL/L (ref 98–107)
CO2: 24 MMOL/L (ref 22–29)
CREAT SERPL-MCNC: 1.5 MG/DL (ref 0.5–1)
CULTURE, BLOOD 2: NORMAL
D DIMER: <200 NG/ML DDU
EOSINOPHILS ABSOLUTE: 0 E9/L (ref 0.05–0.5)
EOSINOPHILS RELATIVE PERCENT: 0 % (ref 0–6)
GFR AFRICAN AMERICAN: 43
GFR NON-AFRICAN AMERICAN: 36 ML/MIN/1.73
GLUCOSE BLD-MCNC: 109 MG/DL (ref 74–99)
HCT VFR BLD CALC: 38.7 % (ref 34–48)
HEMOGLOBIN: 12.4 G/DL (ref 11.5–15.5)
INR BLD: 4.1
LYMPHOCYTES ABSOLUTE: 0.91 E9/L (ref 1.5–4)
LYMPHOCYTES RELATIVE PERCENT: 12 % (ref 20–42)
MAGNESIUM: 1.9 MG/DL (ref 1.6–2.6)
MCH RBC QN AUTO: 26.7 PG (ref 26–35)
MCHC RBC AUTO-ENTMCNC: 32 % (ref 32–34.5)
MCV RBC AUTO: 83.4 FL (ref 80–99.9)
MONOCYTES ABSOLUTE: 0.23 E9/L (ref 0.1–0.95)
MONOCYTES RELATIVE PERCENT: 3 % (ref 2–12)
NEUTROPHILS ABSOLUTE: 6.46 E9/L (ref 1.8–7.3)
NEUTROPHILS RELATIVE PERCENT: 85 % (ref 43–80)
OVALOCYTES: ABNORMAL
PDW BLD-RTO: 17 FL (ref 11.5–15)
PHOSPHORUS: 3.1 MG/DL (ref 2.5–4.5)
PLATELET # BLD: 243 E9/L (ref 130–450)
PMV BLD AUTO: 11.3 FL (ref 7–12)
POIKILOCYTES: ABNORMAL
POTASSIUM SERPL-SCNC: 4.6 MMOL/L (ref 3.5–5)
PROTHROMBIN TIME: 44 SEC (ref 9.3–12.4)
RBC # BLD: 4.64 E12/L (ref 3.5–5.5)
SODIUM BLD-SCNC: 139 MMOL/L (ref 132–146)
T4 FREE: 1.72 NG/DL (ref 0.93–1.7)
TEAR DROP CELLS: ABNORMAL
WBC # BLD: 7.6 E9/L (ref 4.5–11.5)

## 2021-06-05 PROCEDURE — 94660 CPAP INITIATION&MGMT: CPT

## 2021-06-05 PROCEDURE — 6370000000 HC RX 637 (ALT 250 FOR IP): Performed by: INTERNAL MEDICINE

## 2021-06-05 PROCEDURE — 85378 FIBRIN DEGRADE SEMIQUANT: CPT

## 2021-06-05 PROCEDURE — 80048 BASIC METABOLIC PNL TOTAL CA: CPT

## 2021-06-05 PROCEDURE — 36415 COLL VENOUS BLD VENIPUNCTURE: CPT

## 2021-06-05 PROCEDURE — 6360000002 HC RX W HCPCS: Performed by: INTERNAL MEDICINE

## 2021-06-05 PROCEDURE — 84481 FREE ASSAY (FT-3): CPT

## 2021-06-05 PROCEDURE — 83735 ASSAY OF MAGNESIUM: CPT

## 2021-06-05 PROCEDURE — 1200000000 HC SEMI PRIVATE

## 2021-06-05 PROCEDURE — 84439 ASSAY OF FREE THYROXINE: CPT

## 2021-06-05 PROCEDURE — 2580000003 HC RX 258: Performed by: INTERNAL MEDICINE

## 2021-06-05 PROCEDURE — 84100 ASSAY OF PHOSPHORUS: CPT

## 2021-06-05 PROCEDURE — 85610 PROTHROMBIN TIME: CPT

## 2021-06-05 PROCEDURE — 85025 COMPLETE CBC W/AUTO DIFF WBC: CPT

## 2021-06-05 RX ORDER — LEVOTHYROXINE SODIUM 0.12 MG/1
250 TABLET ORAL DAILY
Status: DISCONTINUED | OUTPATIENT
Start: 2021-06-05 | End: 2021-06-05

## 2021-06-05 RX ORDER — LEVOTHYROXINE SODIUM ANHYDROUS 100 UG/5ML
175 INJECTION, POWDER, LYOPHILIZED, FOR SOLUTION INTRAVENOUS DAILY
Status: DISCONTINUED | OUTPATIENT
Start: 2021-06-06 | End: 2021-06-05

## 2021-06-05 RX ORDER — LEVOTHYROXINE SODIUM 0.12 MG/1
250 TABLET ORAL DAILY
Status: DISCONTINUED | OUTPATIENT
Start: 2021-06-06 | End: 2021-06-06

## 2021-06-05 RX ADMIN — Medication 10 ML: at 21:16

## 2021-06-05 RX ADMIN — LEVOTHYROXINE SODIUM 250 MCG: 125 TABLET ORAL at 21:15

## 2021-06-05 RX ADMIN — BUSPIRONE HYDROCHLORIDE 15 MG: 5 TABLET ORAL at 08:23

## 2021-06-05 RX ADMIN — BUSPIRONE HYDROCHLORIDE 15 MG: 5 TABLET ORAL at 21:15

## 2021-06-05 RX ADMIN — CITALOPRAM HYDROBROMIDE 40 MG: 20 TABLET ORAL at 08:23

## 2021-06-05 RX ADMIN — ATORVASTATIN CALCIUM 20 MG: 20 TABLET, FILM COATED ORAL at 08:23

## 2021-06-05 RX ADMIN — Medication 10 ML: at 08:23

## 2021-06-05 RX ADMIN — DEXAMETHASONE SODIUM PHOSPHATE 6 MG: 4 INJECTION, SOLUTION INTRAMUSCULAR; INTRAVENOUS at 08:23

## 2021-06-05 ASSESSMENT — PAIN SCALES - GENERAL
PAINLEVEL_OUTOF10: 0

## 2021-06-05 NOTE — PROGRESS NOTES
Pharmacy Consultation Note  (Warfarin Dosing and Monitoring)    Initial consult date: 6/1  Consulting physician: Alberto Amin    Allergies:  Pcn [penicillins] and Penicillin g    63 y.o. female    Ht Readings from Last 1 Encounters:   06/02/21 5' 3\" (1.6 m)     Wt Readings from Last 1 Encounters:   05/31/21 (!) 350 lb (158.8 kg)         Warfarin Indication Target   INR Range Home Dose  (if applicable) Diet/Feeding Tube   (Enteral feeds, nutritional drinks, increased Vitamin K in diet can decrease INR)   H/o CVA 2-3 Recent changes by PCP: 5mg daily from 2.5mg TRS, 5mg all others General       x Home Med? Meds Increasing INR x Home Med?  Meds Decreasing INR     Allopurinol    Azathioprine     Amiodarone/Propafenone/Dronedarone   Carbamazepine     Androgens   Cholestyramine     Chemotherapy (BBW: Capecitabine)   Estrogen     Ciprofloxacin/Levofloxacin   Nafcillin/Dicloxacillin     Clarithromycin/Erythromycin/Azithromycin   Barbiturates      Fluconazole/Itraconazole/Voriconazole/Ketoconazole   Phenytoin (Variable)     Metronidazole   Rifampin     Phenytoin (Variable)   Steroids (Variable/Dose Dependent)   X   Statins/Fenofibrate/Gemfibrozil   Sucralfate     Steroids (Variable/Dose Dependent)   Other:     Sulfamethoxazole/Trimethoprim        Tramadol         Other:        Comments regarding medication interactions:      x Diseases Affecting INR x Increased Bleeding Risk    CHF Exacerbation (Increases)  History GI Bleed/PUD    Liver Disease (Increases)  Chronic NSAID Use    Thyroid: Hyper (Increases)  Hypo (Decreases)  Chronic ASA/Antiplatelet Use (Clopidogrel/ Dipyridamole/Prasugrel/Ticagrelor)      Malignancy (Increases)  Abnormal Renal Function (dialysis, renal transplant, SCr ? 2.3 mg/dL)     History of EtOH Abuse: Acute (Increases)   Chronic (Decreases)  Liver Function (cirrhosis, bilirubin >2x ULN with AST/ALT/AP >3x ULN)   X Fever (Increases)  Age > 65 years   X Acute infection (Increases)  Hypertension/Uncontrolled BP   X Diarrhea/Dehydration (Increases)  History of stroke    Other: __________________  Other:___________________       Vitamin K or Blood product  Administration Date    Vit K 2.5mg PO x1 5/31              TSH:    Lab Results   Component Value Date    TSH 0.026 06/02/2021        Hepatic Function Panel:                            Lab Results   Component Value Date    ALKPHOS 64 06/01/2021    ALT 17 06/01/2021    AST 37 06/01/2021    PROT 7.1 06/01/2021    BILITOT 0.3 06/01/2021    BILIDIR <0.2 04/28/2017    IBILI 0.1 04/28/2017    LABALBU 2.9 06/01/2021    LABALBU 4.2 05/24/2012       Date Warfarin Dose INR Heparin or LMWH HGB/HCT PLT Comment   5/31 HOLD 7.5 -- 12.8/39.5 135 Vit K 2.5mg PO x1   6/1 HOLD 6.7 -- 12.3/39.1 130    6/2 3.5mg 2.5 -- 12.4/37.8 169    6/3 HOLD 3.2 -- 12.3/37.7 198    6/4 HOLD 4 -- 12.1/37.2 210    6/5 HOLD 4.1 X 12.4/38.7 243                        Assessment and Plan:  · Patient is a 62 yo female presents with COVID   · Patient is on warfarin for hx of CVA   · Goal INR 2-3  · INR 4.1 today; surprising as patent only received 1 dose of warfarin (on 6/2)  · Will hold warfarin again tonight  · Daily PT/INR until the INR is stable within the therapeutic range  · Pharmacist will follow and monitor/adjust dosing as necessary    Comfort Garcia, PharmD  6/5/2021  9:03 AM

## 2021-06-05 NOTE — PROGRESS NOTES
Date: 6/5/2021    Time: 12:07 AM    Patient Placed On BIPAP/CPAP/ Non-Invasive Ventilation? No, already on. If no must comment. Facial area red/color change? No           If YES are Blister/Lesion present? No   If yes must notify nursing staff  BIPAP/CPAP skin barrier?   Yes    Skin barrier type:mepilexlite       Comments:        Alexus Morales RCP

## 2021-06-06 LAB
ANION GAP SERPL CALCULATED.3IONS-SCNC: 9 MMOL/L (ref 7–16)
BASOPHILS ABSOLUTE: 0 E9/L (ref 0–0.2)
BASOPHILS RELATIVE PERCENT: 0 % (ref 0–2)
BUN BLDV-MCNC: 29 MG/DL (ref 6–20)
CALCIUM SERPL-MCNC: 8.8 MG/DL (ref 8.6–10.2)
CHLORIDE BLD-SCNC: 104 MMOL/L (ref 98–107)
CO2: 26 MMOL/L (ref 22–29)
CREAT SERPL-MCNC: 1.4 MG/DL (ref 0.5–1)
EOSINOPHILS ABSOLUTE: 0 E9/L (ref 0.05–0.5)
EOSINOPHILS RELATIVE PERCENT: 0 % (ref 0–6)
GFR AFRICAN AMERICAN: 47
GFR NON-AFRICAN AMERICAN: 38 ML/MIN/1.73
GLUCOSE BLD-MCNC: 113 MG/DL (ref 74–99)
HCT VFR BLD CALC: 38.4 % (ref 34–48)
HEMOGLOBIN: 12.1 G/DL (ref 11.5–15.5)
IMMATURE GRANULOCYTES #: 0.07 E9/L
IMMATURE GRANULOCYTES %: 0.9 % (ref 0–5)
INR BLD: 3.4
LYMPHOCYTES ABSOLUTE: 1.2 E9/L (ref 1.5–4)
LYMPHOCYTES RELATIVE PERCENT: 15.2 % (ref 20–42)
MCH RBC QN AUTO: 26.4 PG (ref 26–35)
MCHC RBC AUTO-ENTMCNC: 31.5 % (ref 32–34.5)
MCV RBC AUTO: 83.7 FL (ref 80–99.9)
MONOCYTES ABSOLUTE: 0.45 E9/L (ref 0.1–0.95)
MONOCYTES RELATIVE PERCENT: 5.7 % (ref 2–12)
NEUTROPHILS ABSOLUTE: 6.18 E9/L (ref 1.8–7.3)
NEUTROPHILS RELATIVE PERCENT: 78.2 % (ref 43–80)
PDW BLD-RTO: 16.7 FL (ref 11.5–15)
PLATELET # BLD: 258 E9/L (ref 130–450)
PMV BLD AUTO: 10.8 FL (ref 7–12)
POTASSIUM SERPL-SCNC: 4.6 MMOL/L (ref 3.5–5)
PROTHROMBIN TIME: 37.4 SEC (ref 9.3–12.4)
RBC # BLD: 4.59 E12/L (ref 3.5–5.5)
SODIUM BLD-SCNC: 139 MMOL/L (ref 132–146)
T3 FREE: 1.2 PG/ML (ref 2–4.4)
T3 FREE: 1.2 PG/ML (ref 2–4.4)
T4 FREE: 2.01 NG/DL (ref 0.93–1.7)
WBC # BLD: 7.9 E9/L (ref 4.5–11.5)

## 2021-06-06 PROCEDURE — 85025 COMPLETE CBC W/AUTO DIFF WBC: CPT

## 2021-06-06 PROCEDURE — 80048 BASIC METABOLIC PNL TOTAL CA: CPT

## 2021-06-06 PROCEDURE — 84439 ASSAY OF FREE THYROXINE: CPT

## 2021-06-06 PROCEDURE — 6360000002 HC RX W HCPCS: Performed by: INTERNAL MEDICINE

## 2021-06-06 PROCEDURE — 84481 FREE ASSAY (FT-3): CPT

## 2021-06-06 PROCEDURE — 2580000003 HC RX 258: Performed by: INTERNAL MEDICINE

## 2021-06-06 PROCEDURE — 36415 COLL VENOUS BLD VENIPUNCTURE: CPT

## 2021-06-06 PROCEDURE — 6370000000 HC RX 637 (ALT 250 FOR IP): Performed by: INTERNAL MEDICINE

## 2021-06-06 PROCEDURE — 1200000000 HC SEMI PRIVATE

## 2021-06-06 PROCEDURE — 85610 PROTHROMBIN TIME: CPT

## 2021-06-06 PROCEDURE — 94660 CPAP INITIATION&MGMT: CPT

## 2021-06-06 RX ORDER — LEVOTHYROXINE SODIUM 0.1 MG/1
100 TABLET ORAL DAILY
Status: DISCONTINUED | OUTPATIENT
Start: 2021-06-06 | End: 2021-06-06

## 2021-06-06 RX ORDER — CLOPIDOGREL BISULFATE 75 MG/1
75 TABLET ORAL DAILY
Status: DISCONTINUED | OUTPATIENT
Start: 2021-06-06 | End: 2021-06-09 | Stop reason: HOSPADM

## 2021-06-06 RX ADMIN — CLOPIDOGREL BISULFATE 75 MG: 75 TABLET ORAL at 15:18

## 2021-06-06 RX ADMIN — BUSPIRONE HYDROCHLORIDE 15 MG: 5 TABLET ORAL at 11:42

## 2021-06-06 RX ADMIN — LEVOTHYROXINE SODIUM 175 MCG: 125 TABLET ORAL at 21:08

## 2021-06-06 RX ADMIN — ATORVASTATIN CALCIUM 20 MG: 20 TABLET, FILM COATED ORAL at 11:42

## 2021-06-06 RX ADMIN — CITALOPRAM HYDROBROMIDE 40 MG: 20 TABLET ORAL at 11:42

## 2021-06-06 RX ADMIN — DEXAMETHASONE 6 MG: 4 TABLET ORAL at 11:42

## 2021-06-06 RX ADMIN — Medication 10 ML: at 11:42

## 2021-06-06 RX ADMIN — BUSPIRONE HYDROCHLORIDE 15 MG: 5 TABLET ORAL at 17:39

## 2021-06-06 ASSESSMENT — PAIN SCALES - GENERAL
PAINLEVEL_OUTOF10: 0

## 2021-06-06 ASSESSMENT — PAIN SCALES - WONG BAKER: WONGBAKER_NUMERICALRESPONSE: 0

## 2021-06-06 NOTE — PROGRESS NOTES
06/01/2021    ALKPHOS 64 06/01/2021    AST 37 06/01/2021    ALT 17 06/01/2021          Assessment/Plan:  Principal Problem:    Pneumonia due to COVID-19 virus  Active Problems:    Hypothyroidism    JOSE on CPAP    HTN (hypertension)    PETRA (acute kidney injury) (HCC)    PFO with atrial septal aneurysm    Rt Hemiparesis following cerebrovascular accident (CVA) (HCC)    Hemiparesis affecting right side as late effect of cerebrovascular accident (CVA) ([facility] [address]    CKD (chronic kidney disease) stage 3, GFR 30-59 ml/min (HCC)    Morbid obesity with BMI of 60.0-69.9, adult ([facility] [address]    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 *       Hypoxemia has resolved    Turns out her undetectable FT4 was likely a lab error    Her actual TFT's are all over the place. FT3 is low, FT4 is high/normal, TSH is super low.     Likely sick euthyroid syndrome    Continue prior dose of synthroid    Recheck as outpatient    Cancel MRI    Continue dex for COVID    CKD stable    Medically ready for d/c  Yuri Carty MD    5:10 PM  6/6/2021

## 2021-06-06 NOTE — PROGRESS NOTES
Chief Complaint:  Chief Complaint   Patient presents with    Generalized Body Aches     pt is from home- states she is weak and unable to eat x a week. Pt recently on Azythro. EMT states that pt needs adult services. House is unlivable for pt      Pneumonia due to COVID-19 virus     Subjective:    Breathing comfortably, denies dyspnea or cough    Objective:    BP (!) 172/72   Pulse (!) 45   Temp 98.1 °F (36.7 °C) (Oral)   Resp 18   Ht 5' 3\" (1.6 m)   Wt (!) 350 lb (158.8 kg)   SpO2 96%   BMI 62.00 kg/m²     Current medications that patient is taking have been reviewed.     General appearance: NAD, conversant, morbidly obese  HEENT: AT/NC, MMM  Neck: FROM, supple  Lungs: Clear to auscultation, WOB normal  CV: RRR, no MRGs  Abdomen: Soft, non-tender; no masses or HSM, +BS  Extremities: No peripheral edema or digital cyanosis  Skin: no rash, lesions or ulcers  Psych: Calm and cooperative  Neuro: Alert and interactive, face symmetric, moving all extremities, speech fluent    Labs:  CBC with Differential:    Lab Results   Component Value Date    WBC 7.6 06/05/2021    RBC 4.64 06/05/2021    HGB 12.4 06/05/2021    HCT 38.7 06/05/2021     06/05/2021    MCV 83.4 06/05/2021    MCH 26.7 06/05/2021    MCHC 32.0 06/05/2021    RDW 17.0 06/05/2021    NRBC 0.0 06/04/2021    SEGSPCT 72 11/12/2013    LYMPHOPCT 12.0 06/05/2021    PROMYELOPCT 0.9 05/31/2021    MONOPCT 3.0 06/05/2021    BASOPCT 0.0 06/05/2021    MONOSABS 0.23 06/05/2021    LYMPHSABS 0.91 06/05/2021    EOSABS 0.00 06/05/2021    BASOSABS 0.00 06/05/2021     CMP:    Lab Results   Component Value Date     06/05/2021    K 4.6 06/05/2021    K 3.9 06/01/2021     06/05/2021    CO2 24 06/05/2021    BUN 27 06/05/2021    CREATININE 1.5 06/05/2021    GFRAA 43 06/05/2021    LABGLOM 36 06/05/2021    GLUCOSE 109 06/05/2021    GLUCOSE 96 05/24/2012    PROT 7.1 06/01/2021    LABALBU 2.9 06/01/2021    LABALBU 4.2 05/24/2012    CALCIUM 8.6 06/05/2021    BILITOT 0.3 06/01/2021    ALKPHOS 64 06/01/2021    AST 37 06/01/2021    ALT 17 06/01/2021          Assessment/Plan:  Principal Problem:    Pneumonia due to COVID-19 virus  Active Problems:    Hypothyroidism    JOSE on CPAP    HTN (hypertension)    PETRA (acute kidney injury) (HCC)    PFO with atrial septal aneurysm    Rt Hemiparesis following cerebrovascular accident (CVA) ([facility])    Hemiparesis affecting right side as late effect of cerebrovascular accident (CVA) ([facility] [address]    CKD (chronic kidney disease) stage 3, GFR 30-59 ml/min ([facility])    Morbid obesity with BMI of 60.0-69.9, adult ([facility] [address]    Hypoprothrombinemia ([facility])  Resolved Problems:    * No resolved hospital problems.  *       Hypoxemia has resolved    Profound central hypothyroidism, luckily without signs of myxedema coma but she does have significant bradycardia    FT4 was normal in April, now undetectable    Not sure why synthroid is being held now    Resume at higher dose    Check FT3 and recheck the FT4    Not sure I can evaluate cortisol levels appropriately since she's on high dose of steroids for COVID    MRI brain (if she fits scanner - a little doubtful)    Consider IV synthroid -- will consult Endocrinology    CKD stable    Requires continued inpatient level of care   Driss Dowd MD    8:33 PM  6/5/2021

## 2021-06-06 NOTE — CONSULTS
CARDIOLOGY CONSULTATION    Patient Name:  Marcell Davila    :  1960    Reason for Consultation:   History of atrial septal aneurysm and subsequent CVA-unrepaired    History of Present Illness:   Marcell Davila presents to [facility], following a 2-week history of upper respiratory infection subsequently diagnosed as COVID-19 virus. She has a longstanding cardiovascular history dating back to  when she sustained an embolic stroke with right-sided hemiparesis which has improved since that time. At that time she underwent a transesophageal echocardiogram which demonstrated an atrial septal aneurysm with presence of thrombus. Interestingly she has undergone 2 subsequent echocardiograms which suggested the interatrial septum was intact yet she never followed through with closure of her patent foramen ovale fortunately, she is not experienced any further strokelike symptoms to suggest recurrence. She denies any chest discomfort but does have shortness of breath with exertion. She also has a recurrent cough related to her COVID-19. Presently she remains on anticoagulation in the form of warfarin and her INR remains super therapeutic. Past Medical History:   has a past medical history of Cerebral artery occlusion with cerebral infarction ([address] Depression, FLAVIO (generalized anxiety disorder), Hypertension, Hypoprothrombinemia ([address] Hypothyroidism, Lymphedema of both lower extremities, Morbid obesity with BMI of 60.0-69.9, adult ([address] Neuropathy, JOSE on CPAP, Osteoarthritis, PFO with atrial septal aneurysm, Pneumonia due to COVID-19 virus, Rt Hemiparesis following cerebrovascular accident (CVA) ([address] TIA (transient ischemic attack), Varicose veins of left leg with edema, Varicose veins of right leg with edema, and Venous insufficiency of both lower extremities. Surgical History:   has a past surgical history that includes Total knee arthroplasty (2008); Tonsillectomy;  Finger trigger release; Dilation and curettage of uterus (N/A, 07/07/2017); other surgical history (N/A, 07/31/2017); joint replacement (Bilateral, 01/2008); and Endometrial biopsy (N/A, 02/26/2018). Social History:   reports that she has never smoked. She has never used smokeless tobacco. She reports that she does not drink alcohol and does not use drugs. Family History:  family history includes Diabetes in her father and mother; Stroke in her father. Medications:  Prior to Admission medications    Medication Sig Start Date End Date Taking? Authorizing Provider   warfarin (COUMADIN) 2.5 MG tablet 2.5mg Daily, monitor INR adjust dose accordingly per physician 6/4/21  Yes Mary Ring MD   dexamethasone (DECADRON) 6 MG tablet Take 1 tablet by mouth daily (with breakfast) for 5 days 6/4/21 6/9/21 Yes Mary Ring MD   benzonatate (TESSALON PERLES) 100 MG capsule Take 1 capsule by mouth 3 times daily as needed for Cough 5/24/21   Bryan Disla DO   vitamin D (ERGOCALCIFEROL) 1.25 MG (20304 UT) CAPS capsule Take 1 capsule by mouth once a week  Patient taking differently: Take 50,000 Units by mouth once a week ON MONDAYS 5/7/21   Bryan Disla DO   traMADol (ULTRAM) 50 MG tablet Take 50 mg by mouth every 6 hours as needed for Pain. Historical Provider, MD   atorvastatin (LIPITOR) 20 MG tablet Take 1 tablet by mouth daily 2/15/21   Yoan Disla DO   loratadine (CLARITIN) 10 MG capsule Take 1 capsule by mouth daily 2/15/21   Yoan Disla, DO   busPIRone (BUSPAR) 15 MG tablet Take 15 mg by mouth 2 times daily 2/15/21   Yoan Disla DO   levothyroxine (SYNTHROID) 175 MCG tablet Take 1 tablet by mouth Daily 2/15/21   Yoan Disla, DO   citalopram (CELEXA) 40 MG tablet Take 1 tablet by mouth daily 2/15/21   Bryan Disla DO   gabapentin (NEURONTIN) 600 MG tablet Take 1 tablet by mouth 3 times daily for 90 days.   Patient taking KELTON Vigil Gender               Female   Medical Record     [id number]      Room Number          6714  Number   Account #          [de-identified]     Procedure Date       06/03/2021   Corporate ID                     Ordering Physician   Julia Alcazar   Accession Number   [id number]    Referring Physician  Kaci Vanegas   Date of Birth      1960    Sonographer          Frankey Eng                                                        [facility]   Age                63 year(s)    Interpreting         Marci Ramirez DO                                   Physician                                    Any Other  Procedure Type of Study   TTE procedure:Echo Complete W/Doppler & Color Flow. Procedure Date Date: 06/03/2021 Start: 02:38 PM Study Location: Portable Technical Quality: Poor visualization due to body habitus. Indications:Bradycardia. Patient Status: Routine Contrast Medium: Definity. Height: 63 inches Weight: 350 pounds BSA: 2.45 m^2 BMI: 62 kg/m^2 HR: 46 bpm BP: 140/68 mmHg  Findings   Left Ventricle  Left ventricle grossly normal in size. Normal LV segmental wall motion. Normal left ventricular wall thickness. Estimated left ventricular ejection fraction is 58±5%. Does not meet 50% criteria for diastolic dysfunction. Right Ventricle  Mildly dilated right ventricle. Right ventricular segmental wall motion is normal.  TAPSE is normal   Left Atrium  The left atrium is mildly dilated. The LAESV Index is <34ml/m2. Interatrial septum appears intact. Right Atrium  Right atrium is normal in size. Mitral Valve  Structurally normal mitral valve. Physiologic and/or trace mitral regurgitation is present. Tricuspid Valve  The tricuspid valve appears structurally normal.  Physiologic and/or trace tricuspid regurgitation. RVSP is 45 mmHg. Pulmonary hypertension is mild . Aortic Valve  The morphology of the aortic valve could not be determined from the images  obtained. Aortic valve opens well.   The LVOT: 2 cm                   Ascending Aorta: 3.3 cm  CI: 1.78                                     LA volume/Index: 72.5 ml  l/m*m^2                                      /29.48ml/m^2  LV Mass: 192.94                              RA Area: 20.9 cm^2  g                                               IVC Expiration: 2.3 cm  Doppler Measurements & Calculations   MV Peak E-Wave: 0.87 AV Peak Velocity:     LVOT Peak Velocity: 1.09 m/s  m/s                  1.47 m/s              LVOT Mean Velocity: 0.74 m/s  MV Peak A-Wave: 0.58 AV Peak Gradient:     LVOT Peak Gradient: 4.8  m/s                  8.63 mmHg             mmHgLVOT Mean Gradient: 2.4  MV E/A Ratio: 1.49   AV Mean Velocity: 1   mmHg  MV Peak Gradient:    m/s  4.6 mmHg             AV Mean Gradient: 4.5  MV Mean Gradient:    mmHg  1.3 mmHg             AV VTI: 40.5 cm       TR Velocity:2.72 m/s  MV Mean Velocity:    AV Area               TR Gradient:29.57 mmHg  0.5 m/s              (Continuity):2.34     PV Peak Velocity: 1.08 m/s  MV Deceleration      cm^2                  PV Peak Gradient: 4.67 mmHg  Time: 144.7 msec                           PV Mean Velocity: 0.78 m/s  MV P1/2t: 78.1 msec  LVOT VTI: 30.2 cm     PV Mean Gradient: 2.7 mmHg  MVA by PHT:2.82 cm^2 IVRT: 110.7 msec  MV Area  (continuity): 2.5  cm^2  MV E' Septal  Velocity: 0.1 m/s  MV E' Lateral  Velocity: 11 m/s  [email or web address] DocKey=[id number]    CT CHEST WO CONTRAST    Result Date: 5/31/2021  EXAMINATION: CT OF THE CHEST WITHOUT CONTRAST 5/31/2021 9:25 pm TECHNIQUE: CT of the chest was performed without the administration of intravenous contrast. Multiplanar reformatted images are provided for review. Dose modulation, iterative reconstruction, and/or weight based adjustment of the mA/kV was utilized to reduce the radiation dose to as low as reasonably achievable.  COMPARISON: July 8, 2015 Plan:  Would consider readjusting dosage of levothyroxine with present thyroid lab results. Continue anticoagulation and would add clopidogrel for the next 30 days with the hope of decreasing any potential abnormal thrombotic state associated with COVID-19. Fortunately her proBNP is only minimally elevated presently.  requests that I follow her up on an outpatient basis as she is not attended with any cardiologist since 2015 on her own accord. I have spent more than 45 minutes face to face with Vera Carreno,and reviewing notes and laboratory data with greater than 50% of this time instructing and counseling the patient regarding my findings and recommendations and I have answered all questions as posed to me by Ms. Carreno. Thank you, Lynn Cannon DO for allowing me to consult in the care of this patient. Harleen Girard DO DO, FACP, [facility]    NOTE:  This report was transcribed using voice recognition software. Every effort was made to ensure accuracy; however, inadvertent computerized transcription errors may be present.

## 2021-06-06 NOTE — PROGRESS NOTES
Pharmacy Consultation Note  (Warfarin Dosing and Monitoring)    Initial consult date: 6/1  Consulting physician: Joel Gonzalez    Allergies:  Pcn [penicillins] and Penicillin g    63 y.o. female    Ht Readings from Last 1 Encounters:   06/02/21 5' 3\" (1.6 m)     Wt Readings from Last 1 Encounters:   05/31/21 (!) 350 lb (158.8 kg)         Warfarin Indication Target   INR [place] Dose  (if applicable) Diet/Feeding Tube   (Enteral feeds, nutritional drinks, increased Vitamin K in diet can decrease INR)   H/o CVA 2-3 Recent changes by PCP: 5mg daily from 2.5mg TRS, 5mg all others General       x Home Med? Meds Increasing INR x Home Med?  Meds Decreasing INR     Allopurinol    Azathioprine     Amiodarone/Propafenone/Dronedarone   Carbamazepine     Androgens   Cholestyramine     Chemotherapy (BBW: Capecitabine)   Estrogen     Ciprofloxacin/Levofloxacin   Nafcillin/Dicloxacillin     Clarithromycin/Erythromycin/Azithromycin   Barbiturates      Fluconazole/Itraconazole/Voriconazole/Ketoconazole   Phenytoin (Variable)     Metronidazole   Rifampin     Phenytoin (Variable)   Steroids (Variable/Dose Dependent)   X   Statins/Fenofibrate/Gemfibrozil   Sucralfate     Steroids (Variable/Dose Dependent)   Other:     Sulfamethoxazole/Trimethoprim        Tramadol         Other:        Comments regarding medication interactions:      x Diseases Affecting INR x Increased Bleeding Risk    CHF Exacerbation (Increases)  History GI Bleed/PUD    Liver Disease (Increases)  Chronic NSAID Use    Thyroid: Hyper (Increases)  Hypo (Decreases)  Chronic ASA/Antiplatelet Use (Clopidogrel/ Dipyridamole/Prasugrel/Ticagrelor)      Malignancy (Increases)  Abnormal Renal Function (dialysis, renal transplant, SCr ? 2.3 mg/dL)     History of EtOH Abuse: Acute (Increases)   Chronic (Decreases)  Liver Function (cirrhosis, bilirubin >2x ULN with AST/ALT/AP >3x ULN)   X Fever (Increases)  Age > 65 years   X Acute infection (Increases)  Hypertension/Uncontrolled BP   X

## 2021-06-06 NOTE — PROGRESS NOTES
PROGRESS NOTE       PATIENT PROBLEM LIST:  Principal Problem:    Pneumonia due to COVID-19 virus  Active Problems:    Hypothyroidism    JOSE on CPAP    HTN (hypertension)    PETRA (acute kidney injury) ([facility])    PFO with atrial septal aneurysm    Rt Hemiparesis following cerebrovascular accident (CVA) ([facility])    Hemiparesis affecting right side as late effect of cerebrovascular accident (CVA) ([facility]    CKD (chronic kidney disease) stage 3, GFR 30-59 ml/min ([facility])    Morbid obesity with BMI of 60.0-69.9, adult ([facility]    Hypoprothrombinemia ([facility]  Resolved Problems:    * No resolved hospital problems. *      SUBJECTIVE:  [place] ***    REVIEW OF SYSTEMS:  General ROS: negative for - fatigue, malaise,  weight gain or weight loss  Psychological ROS: negative for - anxiety , depression  Ophthalmic ROS: negative for - decreased vision or visual distortion. ENT ROS: negative  Allergy and Immunology ROS: negative  Hematological and Lymphatic ROS: negative  Endocrine: no heat or cold intolerance and no polyphagia, polydipsia, or polyuria  Respiratory ROS: positive for - {rosresp symptoms:525383}  Cardiovascular ROS: positive for - {roscv symptoms:669243}. Gastrointestinal ROS: no abdominal pain, change in bowel habits, or black or bloody stools  Genito-Urinary ROS: no nocturia, dysuria, trouble voiding, frequency or hematuria  Musculoskeletal ROS: negative for- myalgias, arthralgias, or claudication  Neurological ROS: no TIA or stroke symptoms otherwise no significant change in symptoms or problems since yesterday as documented in previous progress notes.     SCHEDULED MEDICATIONS:   levothyroxine  100 mcg Oral Daily    clopidogrel  75 mg Oral Daily    dexamethasone  6 mg Oral Daily    warfarin (COUMADIN) daily dosing (placeholder)   Other Daily    atorvastatin  20 mg Oral Daily    busPIRone  15 mg Oral BID    citalopram  40 mg Oral Daily    sodium chloride flush  5-40 mL Intravenous 2 times per day       VITAL SIGNS:                                                                                                                          /70   Pulse (!) 45   Temp 98 °F (36.7 °C) (Oral)   Resp 18   Ht 5' 3\" (1.6 m)   Wt (!) 350 lb (158.8 kg)   SpO2 94%   BMI 62.00 kg/m²   No data found. OBJECTIVE:    HEENT: PERRL, EOM  Intact; sclera non-icteric, conjunctiva pink. Carotids are brisk in upstroke with normal contour. No carotid bruits. Normal jugular venous pulsation at 45°. No palpable cervical nor supraclavicular nodes. Thyroid not palpable. Trachea midline. Chest: Even excursion  Lungs: CTA B, no expiratory wheezes or rhonchi, no decreased tactile fremitus without inspiratory rales. Heart: Regular  rhythm; S1 > S2, no gallop or murmur. No clicks, rub, palpable thrills   or heaves. PMI nondisplaced, 5th intercostal space MCL. Abdomen: Soft, nontender, nondistended,  {Blank single:43595::\"scaphoid\",\"mildly protuberant\",\"moderately protuberant\",\"grossly protuberant\"}, no masses or organomegaly. Bowel sounds active. Extremities: Without clubbing, cyanosis or edema. Pulses present 3+ upper extermities bilaterally; {POSITIVE-NEGATIVE PULSES:96165} DP and {POSITIVE-NEGATIVE PULSES:03884} PT bilaterally.      Data:   Scheduled Meds: Reviewed  Continuous Infusions:    sodium chloride         Intake/Output Summary (Last 24 hours) at 6/6/2021 1309  Last data filed at 6/6/2021 0800  Gross per 24 hour   Intake    Output 1850 ml   Net -1850 ml     CBC:   Recent Labs     06/04/21  0530 06/05/21  0440 06/06/21  0515   WBC 5.9 7.6 7.9   HGB 12.1 12.4 12.1   HCT 37.2 38.7 38.4    243 258     BMP:  Recent Labs     06/04/21  0530 06/05/21  0440 06/06/21  0515    139 139   K 4.5 4.6 4.6    106 104   CO2 25 24 26   BUN 25* 27* 29*   CREATININE 1.4* 1.5* 1.4*   LABGLOM 38 36 38     ABGs: No results found for: PH, PO2, PCO2  INR:   Recent Labs     06/04/21  0530 06/05/21  0440 06/06/21  0515   INR 4.0 4.1 3.4 PRO-BNP:   Lab Results   Component Value Date    PROBNP 599 (H) 05/31/2021      TSH:   Lab Results   Component Value Date    TSH 0.026 (L) 06/02/2021      Cardiac Injury Profile: No results for input(s): CKTOTAL, CKMB, TROPONINI in the last 72 hours. Lipid Profile:   Lab Results   Component Value Date    TRIG 97 04/06/2021    HDL 44 04/06/2021    LDLCALC 46 04/06/2021    CHOL 109 04/06/2021      Hemoglobin A1C: No components found for: HGBA1C     RAD:   Echo Complete    Result Date: 6/4/2021  Transthoracic Echocardiography Report (TTE)  Demographics   Patient Name       Dominick BOURNE Gender               Female   Medical Record     [id number]      Room Number          8749  Number   Account #          [de-identified]     Procedure Date       06/03/2021   Corporate ID                     Ordering Physician   Mary Lyon   Accession Number   [id number]    Referring Physician  Kirsten Gagnon   Date of Birth      1960    Sonographer          [address] [place] [address]   Age                63 year(s)    Interpreting         Keri Zamora DO                                   Physician                                    Any Other  Procedure Type of Study   TTE procedure:Echo Complete W/Doppler & Color Flow. Procedure Date Date: 06/03/2021 Start: 02:38 PM Study Location: Portable Technical Quality: Poor visualization due to body habitus. Indications:Bradycardia. Patient Status: Routine Contrast Medium: Definity. Height: 63 inches Weight: 350 pounds BSA: 2.45 m^2 BMI: 62 kg/m^2 HR: 46 bpm BP: 140/68 mmHg  Findings   Left Ventricle  Left ventricle grossly normal in size. Normal LV segmental wall motion. Normal left ventricular wall thickness. Estimated left ventricular ejection fraction is 58±5%. Does not meet 50% criteria for diastolic dysfunction. Right Ventricle  Mildly dilated right ventricle.   Right ventricular segmental wall motion is normal.  TAPSE is ----------------------------------------------------------------  M-Mode/2D Measurements & Calculations   LV Diastolic    LV Volume Diastolic: 48.3 ml AV Cusp Separation: 0.8 cmLA  Dimension: 4.6  LV Volume Systolic: 05.5 ml  Dimension: 4.1 cmAO Root  cm              LV EDV/LV EDV Index: 97.9    Dimension: 2 cm  LV PW           ml/40 ml/m^2LV ESV/LV ESV  Diastolic: 1.2  Index: 69.9 ml/21ml/ m^2  cm              EF Calculated: 46.7 %  Septum          LV Mass Index: 79 l/min*m^2  RV Diastolic Dimension: 4 cm  Diastolic: 1.1  LV Length: 7.9 cm  cm                                           LA/Aorta: 2.05  CO: 4.36 l/min  LVOT: 2 cm                   Ascending Aorta: 3.3 cm  CI: 1.78                                     LA volume/Index: 72.5 ml  l/m*m^2                                      /29.48ml/m^2  LV Mass: 192.94                              RA Area: 20.9 cm^2  g                                               IVC Expiration: 2.3 cm  Doppler Measurements & Calculations   MV Peak E-Wave: 0.87 AV Peak Velocity:     LVOT Peak Velocity: 1.09 m/s  m/s                  1.47 m/s              LVOT Mean Velocity: 0.74 m/s  MV Peak A-Wave: 0.58 AV Peak Gradient:     LVOT Peak Gradient: 4.8  m/s                  8.63 mmHg             mmHgLVOT Mean Gradient: 2.4  MV E/A Ratio: 1.49   AV Mean Velocity: 1   mmHg  MV Peak Gradient:    m/s  4.6 mmHg             AV Mean Gradient: 4.5  MV Mean Gradient:    mmHg  1.3 mmHg             AV VTI: 40.5 cm       TR Velocity:2.72 m/s  MV Mean Velocity:    AV Area               TR Gradient:29.57 mmHg  0.5 m/s              (Continuity):2.34     PV Peak Velocity: 1.08 m/s  MV Deceleration      cm^2                  PV Peak Gradient: 4.67 mmHg  Time: 144.7 msec                           PV Mean Velocity: 0.78 m/s  MV P1/2t: 78.1 msec  LVOT VTI: 30.2 cm     PV Mean Gradient: 2.7 mmHg  MVA by PHT:2.82 cm^2 IVRT: 110.7 msec  MV Area  (continuity): 2.5  cm^2  MV E' Septal  Velocity: 0.1 m/s  MV E' Lateral  Velocity: 11 m/s  [email or web address] [id number]    CT CHEST WO CONTRAST    Result Date: 5/31/2021  EXAMINATION: CT OF THE CHEST WITHOUT CONTRAST 5/31/2021 9:25 pm TECHNIQUE: CT of the chest was performed without the administration of intravenous contrast. Multiplanar reformatted images are provided for review. Dose modulation, iterative reconstruction, and/or weight based adjustment of the mA/kV was utilized to reduce the radiation dose to as low as reasonably achievable. COMPARISON: July 8, 2015 HISTORY: ORDERING SYSTEM PROVIDED HISTORY: COVID+, CXR not helpful due to BMI TECHNOLOGIST PROVIDED HISTORY: Reason for exam:->COVID+, CXR not helpful due to BMI FINDINGS: Patchy ground-glass opacities throughout both lungs notable toward lung periphery. No pleural effusion. No pneumothorax. There are multiple prominent and enlarged mediastinal lymph nodes notable in left paratracheal location measuring up to 2.1 x 2 cm and anterior mediastinum on axial image number 45 measuring 1.4 x 1.7 cm. The heart is mildly enlarged. No pericardial effusion. View of the upper abdomen shows normal bilateral adrenal glands. Calcified gallstone located in the gallbladder measures up to 1.3 cm. Atrophy of visualized right kidney with nonobstructing calculus. 1.  Diffuse patchy ground-glass opacities related to bilateral pneumonia. 2.  Mediastinal lymphadenopathy. 3.  Cholelithiasis. XR CHEST PORTABLE    Result Date: 5/31/2021  EXAMINATION: ONE XRAY VIEW OF THE CHEST 5/31/2021 5:32 pm COMPARISON: None. HISTORY: ORDERING SYSTEM PROVIDED HISTORY: cough TECHNOLOGIST PROVIDED HISTORY: Reason for exam:->cough FINDINGS: The heart is enlarged. There is focal opacity in the left midlung. Pulmonary vessels are congested. No pneumothorax. Costophrenic angles are clear.      Cardiomegaly and pulmonary vascular

## 2021-06-07 LAB
ANION GAP SERPL CALCULATED.3IONS-SCNC: 9 MMOL/L (ref 7–16)
BASOPHILS ABSOLUTE: 0.01 E9/L (ref 0–0.2)
BASOPHILS RELATIVE PERCENT: 0.1 % (ref 0–2)
BUN BLDV-MCNC: 26 MG/DL (ref 6–20)
CALCIUM SERPL-MCNC: 8.4 MG/DL (ref 8.6–10.2)
CHLORIDE BLD-SCNC: 103 MMOL/L (ref 98–107)
CO2: 24 MMOL/L (ref 22–29)
CREAT SERPL-MCNC: 1.4 MG/DL (ref 0.5–1)
EOSINOPHILS ABSOLUTE: 0 E9/L (ref 0.05–0.5)
EOSINOPHILS RELATIVE PERCENT: 0 % (ref 0–6)
GFR AFRICAN AMERICAN: 47
GFR NON-AFRICAN AMERICAN: 38 ML/MIN/1.73
GLUCOSE BLD-MCNC: 101 MG/DL (ref 74–99)
HCT VFR BLD CALC: 40.6 % (ref 34–48)
HEMOGLOBIN: 12.8 G/DL (ref 11.5–15.5)
IMMATURE GRANULOCYTES #: 0.05 E9/L
IMMATURE GRANULOCYTES %: 0.6 % (ref 0–5)
INR BLD: 2.6
LYMPHOCYTES ABSOLUTE: 1.02 E9/L (ref 1.5–4)
LYMPHOCYTES RELATIVE PERCENT: 12.4 % (ref 20–42)
MCH RBC QN AUTO: 27.1 PG (ref 26–35)
MCHC RBC AUTO-ENTMCNC: 31.5 % (ref 32–34.5)
MCV RBC AUTO: 86 FL (ref 80–99.9)
MONOCYTES ABSOLUTE: 0.41 E9/L (ref 0.1–0.95)
MONOCYTES RELATIVE PERCENT: 5 % (ref 2–12)
NEUTROPHILS ABSOLUTE: 6.73 E9/L (ref 1.8–7.3)
NEUTROPHILS RELATIVE PERCENT: 81.9 % (ref 43–80)
PDW BLD-RTO: 17.5 FL (ref 11.5–15)
PLATELET # BLD: 238 E9/L (ref 130–450)
PMV BLD AUTO: 11.3 FL (ref 7–12)
POTASSIUM SERPL-SCNC: 5.1 MMOL/L (ref 3.5–5)
PROTHROMBIN TIME: 28.4 SEC (ref 9.3–12.4)
RBC # BLD: 4.72 E12/L (ref 3.5–5.5)
SODIUM BLD-SCNC: 136 MMOL/L (ref 132–146)
WBC # BLD: 8.2 E9/L (ref 4.5–11.5)

## 2021-06-07 PROCEDURE — 97530 THERAPEUTIC ACTIVITIES: CPT

## 2021-06-07 PROCEDURE — 1200000000 HC SEMI PRIVATE

## 2021-06-07 PROCEDURE — 80048 BASIC METABOLIC PNL TOTAL CA: CPT

## 2021-06-07 PROCEDURE — 9900000074 HC THERAPEUTIC ACTIVITIES PER 15 MIN (SELF-PAY)

## 2021-06-07 PROCEDURE — 85025 COMPLETE CBC W/AUTO DIFF WBC: CPT

## 2021-06-07 PROCEDURE — 36415 COLL VENOUS BLD VENIPUNCTURE: CPT

## 2021-06-07 PROCEDURE — 94660 CPAP INITIATION&MGMT: CPT

## 2021-06-07 PROCEDURE — 97535 SELF CARE MNGMENT TRAINING: CPT

## 2021-06-07 PROCEDURE — 85610 PROTHROMBIN TIME: CPT

## 2021-06-07 PROCEDURE — 6370000000 HC RX 637 (ALT 250 FOR IP): Performed by: INTERNAL MEDICINE

## 2021-06-07 PROCEDURE — 6360000002 HC RX W HCPCS: Performed by: INTERNAL MEDICINE

## 2021-06-07 RX ORDER — WARFARIN SODIUM 2.5 MG/1
2.5 TABLET ORAL
Status: COMPLETED | OUTPATIENT
Start: 2021-06-07 | End: 2021-06-07

## 2021-06-07 RX ORDER — LEVOTHYROXINE SODIUM 0.12 MG/1
125 TABLET ORAL NIGHTLY
Status: DISCONTINUED | OUTPATIENT
Start: 2021-06-07 | End: 2021-06-09 | Stop reason: HOSPADM

## 2021-06-07 RX ADMIN — CLOPIDOGREL BISULFATE 75 MG: 75 TABLET ORAL at 10:45

## 2021-06-07 RX ADMIN — BUSPIRONE HYDROCHLORIDE 15 MG: 5 TABLET ORAL at 10:44

## 2021-06-07 RX ADMIN — WARFARIN SODIUM 2.5 MG: 2.5 TABLET ORAL at 17:30

## 2021-06-07 RX ADMIN — ATORVASTATIN CALCIUM 20 MG: 20 TABLET, FILM COATED ORAL at 10:45

## 2021-06-07 RX ADMIN — CITALOPRAM HYDROBROMIDE 40 MG: 20 TABLET ORAL at 10:44

## 2021-06-07 RX ADMIN — DEXAMETHASONE 6 MG: 4 TABLET ORAL at 10:45

## 2021-06-07 RX ADMIN — BUSPIRONE HYDROCHLORIDE 15 MG: 5 TABLET ORAL at 17:30

## 2021-06-07 RX ADMIN — LEVOTHYROXINE SODIUM 125 MCG: 125 TABLET ORAL at 22:29

## 2021-06-07 ASSESSMENT — PAIN SCALES - GENERAL
PAINLEVEL_OUTOF10: 0

## 2021-06-07 ASSESSMENT — PAIN SCALES - WONG BAKER: WONGBAKER_NUMERICALRESPONSE: 0

## 2021-06-07 NOTE — PROGRESS NOTES
Outpatient pharmacy did not fill prescriptions for patient , patient is going  to facility per Alan Mcgrath.

## 2021-06-07 NOTE — PROGRESS NOTES
Pharmacy Consultation Note  (Warfarin Dosing and Monitoring)    Initial consult date: 6/1  Consulting physician: Piper Fowler    Allergies:  Pcn [penicillins] and Penicillin g    63 y.o. female    Ht Readings from Last 1 Encounters:   06/02/21 5' 3\" (1.6 m)     Wt Readings from Last 1 Encounters:   05/31/21 (!) 350 lb (158.8 kg)         Warfarin Indication Target   INR Range Home Dose  (if applicable) Diet/Feeding Tube   (Enteral feeds, nutritional drinks, increased Vitamin K in diet can decrease INR)   H/o CVA 2-3 Recent changes by PCP: 5mg daily from 2.5mg TRS, 5mg all others General       x Home Med? Meds Increasing INR x Home Med?  Meds Decreasing INR     Allopurinol    Azathioprine     Amiodarone/Propafenone/Dronedarone   Carbamazepine     Androgens   Cholestyramine     Chemotherapy (BBW: Capecitabine)   Estrogen     Ciprofloxacin/Levofloxacin   Nafcillin/Dicloxacillin     Clarithromycin/Erythromycin/Azithromycin   Barbiturates      Fluconazole/Itraconazole/Voriconazole/Ketoconazole   Phenytoin (Variable)     Metronidazole   Rifampin     Phenytoin (Variable)   Steroids (Variable/Dose Dependent)   X   Statins/Fenofibrate/Gemfibrozil   Sucralfate     Steroids (Variable/Dose Dependent)   Other:     Sulfamethoxazole/Trimethoprim        Tramadol         Other:        Comments regarding medication interactions:      x Diseases Affecting INR x Increased Bleeding Risk    CHF Exacerbation (Increases)  History GI Bleed/PUD    Liver Disease (Increases)  Chronic NSAID Use    Thyroid: Hyper (Increases)  Hypo (Decreases)  Chronic ASA/Antiplatelet Use (Clopidogrel/ Dipyridamole/Prasugrel/Ticagrelor)      Malignancy (Increases)  Abnormal Renal Function (dialysis, renal transplant, SCr ? 2.3 mg/dL)     History of EtOH Abuse: Acute (Increases)   Chronic (Decreases)  Liver Function (cirrhosis, bilirubin >2x ULN with AST/ALT/AP >3x ULN)   X Fever (Increases)  Age > 65 years   X Acute infection (Increases)  Hypertension/Uncontrolled BP   X Diarrhea/Dehydration (Increases)  History of stroke    Other: __________________  Other:___________________       Vitamin K or Blood product  Administration Date    Vit K 2.5mg PO x1 5/31              TSH:    Lab Results   Component Value Date    TSH 0.026 06/02/2021        Hepatic Function Panel:                            Lab Results   Component Value Date    ALKPHOS 64 06/01/2021    ALT 17 06/01/2021    AST 37 06/01/2021    PROT 7.1 06/01/2021    BILITOT 0.3 06/01/2021    BILIDIR <0.2 04/28/2017    IBILI 0.1 04/28/2017    LABALBU 2.9 06/01/2021    LABALBU 4.2 05/24/2012       Date Warfarin Dose INR Heparin or LMWH HGB/HCT PLT Comment   5/31 HOLD 7.5 -- 12.8/39.5 135 Vit K 2.5mg PO x1   6/1 HOLD 6.7 -- 12.3/39.1 130    6/2 3.5mg 2.5 -- 12.4/37.8 169    6/3 HOLD 3.2 -- 12.3/37.7 198    6/4 HOLD 4 -- 12.1/37.2 210    6/5 HOLD 4.1 X 12.4/38.7 243    6/6 HOLD 3.4 X 12.1/38.4 258    6/7 2.5mg 2.6 -- 12.8/40.6 238      Assessment and Plan:  · Patient is a 64 yo female presents with COVID   · Patient is on warfarin for hx of CVA   · Goal INR 2-3  · INR 2.6  · Warfarin 2.5mg tonight; INR may continue to decrease tomorrow after being on hold for multiple days  · Daily PT/INR until the INR is stable within the therapeutic range  · Pharmacist will follow and monitor/adjust dosing as necessary    Dimple RoldanD, BCPS 6/7/2021 12:53 PM   Ext: [phone]

## 2021-06-07 NOTE — PROGRESS NOTES
[facility] Quality Flow/Interdisciplinary Rounds Progress Note        Quality Flow Rounds held on June 7, 2021    Disciplines Attending:  Bedside Nurse, ,  and Nursing Unit Leadership    Dev Valencia was admitted on 5/31/2021  4:28 PM    Anticipated Discharge Date:  Expected Discharge Date: 06/03/21    Disposition:    Roberto Score:  Roberto Scale Score: 16    Readmission Risk              Risk of Unplanned Readmission:  20           Discussed patient goal for the day, patient clinical progression, and barriers to discharge. The following Goal(s) of the Day/Commitment(s) have been identified:  awaiting precert, PT/OT re eval, discharge planning.        Stanley Renner RN  June 7, 2021

## 2021-06-07 NOTE — PROGRESS NOTES
Subjective:  Feeling better   No CP or SOB  No fever or chills   No uncontrolled pain  No vomiting or diarrhea     Objective:    BP (!) 140/74   Pulse (!) 48   Temp 98.7 °F (37.1 °C) (Oral)   Resp 16   Ht 5' 3\" (1.6 m)   Wt (!) 350 lb (158.8 kg)   SpO2 98%   BMI 62.00 kg/m²     24HR INTAKE/OUTPUT:      Intake/Output Summary (Last 24 hours) at 6/7/2021 0926  Last data filed at 6/7/2021 0745  Gross per 24 hour   Intake --   Output 700 ml   Net -700 ml       General appearance: NAD, conversant obese  Neck: FROM, supple   Lungs: Clear bilaterally no wheezes, no rhonchi, no crackles  CV: RRR, no MRGs; normal carotid upstroke and amplitude without Bruits  Abdomen: Soft, non-tender; no masses or HSM  Extremities: No edema, no cyanosis, no clubbing  Skin: Intact no rash, no lesions, no ulcers    Psych: Alert and oriented normal affect  Neuro: Nonfocal  Most Recent Labs  Lab Results   Component Value Date    WBC 8.2 06/07/2021    HGB 12.8 06/07/2021    HCT 40.6 06/07/2021     06/07/2021     06/07/2021    K 5.1 (H) 06/07/2021     06/07/2021    CREATININE 1.4 (H) 06/07/2021    BUN 26 (H) 06/07/2021    CO2 24 06/07/2021    GLUCOSE 101 (H) 06/07/2021    ALT 17 06/01/2021    AST 37 (H) 06/01/2021    INR 2.6 06/07/2021    TSH 0.026 (L) 06/02/2021    LABA1C 5.9 (H) 06/04/2021    LABMICR <12.0 04/26/2017     Recent Labs     06/05/21  0440   MG 1.9     Lab Results   Component Value Date    CALCIUM 8.4 (L) 06/07/2021    PHOS 3.1 06/05/2021        CT CHEST WO CONTRAST   Final Result   1. Diffuse patchy ground-glass opacities related to bilateral pneumonia. 2.  Mediastinal lymphadenopathy. 3.  Cholelithiasis. XR CHEST PORTABLE   Final Result   Cardiomegaly and pulmonary vascular congestion. Focal opacity in the left midlung.              Assessment    Principal Problem:    Pneumonia due to COVID-19 virus  Active Problems:    Hypothyroidism    JOSE on CPAP    HTN (hypertension)    PETRA (acute kidney injury) ([facility]    PFO with atrial septal aneurysm    Rt Hemiparesis following cerebrovascular accident (CVA) ([facility] [address]    Hemiparesis affecting right side as late effect of cerebrovascular accident (CVA) ([facility] [address]    CKD (chronic kidney disease) stage 3, GFR 30-59 ml/min ([facility])    Morbid obesity with BMI of 60.0-69.9, adult ([facility] [address]    Hypoprothrombinemia ([facility] [address]  Resolved Problems:    * No resolved hospital problems. *      Plan:  63year old female presenting with fever, chills, fatigue and cough.        COVID-19 PNA -IV decadron  -remdesivir per pharmacy  -monitor D.  Dimer and CRP  -monitor O2 sats closely  -tessalon for cough     UTI  UCx polymicrobial  -IV Rocphin completed     Hx CVA from PFO  -coumadin per pharmacy-supratherapeutic INR -  -continue statin     Coumadin coagulopathy  INR 6.7 on admission  Hold and monitor    Hypothyroidism--current laboratory assessment suggests a component of iatrogenic hyperthyroidism presently  -cont synthroid-decrease dose  -TSH low, free T4 elevated    PETRA complicating CKD 3  Improving with IV fluids- stopped  Avoid nephrotoxins  Monitor     JOSE  dscd import of CPAP compliance  Medications for other co morbidities cont as appropriate w dosage adjustments as necessary    Bradycardia-asymptomatic  No AV blocking agents  Baclofen is ordered as needed but has not been taken  Sinus bradycardia on EKG  Cardiology consulted by overnight provider     PT/OT  DVT PPx  DC planning SNF pending bed       Electronically signed by Clement Beal MD on 6/7/2021 at 9:26 AM

## 2021-06-07 NOTE — PROGRESS NOTES
Physical Therapy  [facility]  Daily Treatment Note  NAME: Tate Rojo  : 1960  MRN: [id number]    Date of Service: 2021    Patient Diagnosis(es): The primary encounter diagnosis was Pneumonia due to infectious organism, unspecified laterality, unspecified part of lung. Diagnoses of Rt Hemiparesis following cerebrovascular accident (CVA) ([facility] [address] and Unable to ambulate were also pertinent to this visit. has a past medical history of Cerebral artery occlusion with cerebral infarction ([address] Depression, FLAVIO (generalized anxiety disorder), Hypertension, Hypoprothrombinemia ([facility] [address] Hypothyroidism, Lymphedema of both lower extremities, Morbid obesity with BMI of 60.0-69.9, adult ([facility] [address] Neuropathy, JOSE on CPAP, Osteoarthritis, PFO with atrial septal aneurysm, Pneumonia due to COVID-19 virus, Rt Hemiparesis following cerebrovascular accident (CVA) ([facility] [address] TIA (transient ischemic attack), Varicose veins of left leg with edema, Varicose veins of right leg with edema, and Venous insufficiency of both lower extremities. has a past surgical history that includes Total knee arthroplasty (2008); Tonsillectomy; Finger trigger release; Dilation and curettage of uterus (N/A, 2017); other surgical history (N/A, 2017); joint replacement (Bilateral, 2008); and Endometrial biopsy (N/A, 2018).    Referring Provider:  Bimal Faye MD        Evaluating Therapist: Madeline BARROS        Room #:629  DIAGNOSIS: Pneumonia  Additional Pertinent History:CVA with R side weakness  PRECAUTIONS: falls, droplet plus isolaiton     Social:  Pt lives alone in a 1 floor plan has caregiver 5 hrs a day that assist with cleaning, meals, bathing. Prior to admission Pt non-ambulatory. [facility]       Initial Evaluation  Date: 21 Treatment   21   Short Term/ Long Term   Goals   Was pt agreeable to Eval/treatment? yes yes     Does pt have pain?  Pain R UE

## 2021-06-07 NOTE — DISCHARGE SUMMARY
Physician Discharge Summary     Patient ID:  Ghada Hubbard  [id number]  63 y.o.  1960    Admit date: 5/31/2021    Discharge date and time:  6/9/2021    Discharge Diagnoses: Principal Problem:    Pneumonia due to COVID-19 virus  Active Problems:    Hypothyroidism    JOSE on CPAP    HTN (hypertension)    PETRA (acute kidney injury) (H[facility])    PFO with atrial septal aneurysm    Rt Hemiparesis following cerebrovascular accident (CVA) ([facility] [address]    Hemiparesis affecting right side as late effect of cerebrovascular accident (CVA) ([facility] [address]    CKD (chronic kidney disease) stage 3, GFR 30-59 ml/min (H[facility])    Morbid obesity with BMI of 60.0-69.9, adult ([facility] [address]    Hypoprothrombinemia ([facility] [address]  Resolved Problems:    * No resolved hospital problems. *      Consults: IP CONSULT TO INTERNAL MEDICINE  IP CONSULT TO PHARMACY  IP CONSULT TO PHARMACY  IP CONSULT TO HOME CARE NEEDS  IP CONSULT TO HOME CARE NEEDS  IP CONSULT TO CARDIOLOGY  IP CONSULT TO HOME CARE NEEDS    Procedures: See below    Hospital Course: 63year old female presenting with fever, chills, fatigue and cough.        COVID-19 PNA -IV decadron completed   -remdesivir per pharmacy  -monitor D.  Dimer and CRP  -monitor O2 sats closely  -tessalon for cough     UTI  UCx polymicrobial  -IV Rocphin completed     Hx CVA from PFO  -coumadin per pharmacy-supratherapeutic INR -  -continue statin     Coumadin coagulopathy  INR 6.7 on admission  Hold and monitor    Hypothyroidism--current laboratory assessment suggests a component of iatrogenic hyperthyroidism presently  -cont synthroid-decrease dose  -TSH low, free T4 elevated    PETRA complicating CKD 3-improved  Improving with IV fluids- stopped  Avoid nephrotoxins  Monitor     JOSE  dscd import of CPAP compliance  Medications for other co morbidities cont as appropriate w dosage adjustments as necessary    Bradycardia-asymptomatic  No AV blocking agents  Baclofen is ordered as needed but has not been taken  Sinus bradycardia on EKG  Cardiology tingling of right arm and leg      baclofen (LIORESAL) 10 MG tablet TAKE 1/2 TABLET BY MOUTH 2 TIMES A DAY  Qty: 90 tablet, Refills: 0    Associated Diagnoses: Hemiparesis following cerebrovascular accident (CVA) ([address]      mupirocin (BACTROBAN) 2 % ointment Apply topically 3 times daily. Qty: 22 g, Refills: 2    Associated Diagnoses: Impetigo      mineral oil-hydrophilic petrolatum (HYDROPHOR) ointment Apply topically as needed. Qty: 228 g, Refills: 0      Disposable Gloves (LATEX GLOVES MEDIUM) MISC Use as needed. Qty: 100 each, Refills: 0      Elastic Bandages & Supports (MEDICAL COMPRESSION STOCKINGS) MISC 1 each by Does not apply route daily  Qty: 1 each, Refills: 0    Comments: Siguaris compreflex compression stockings. Send to [facility].  Associated Diagnoses: Lymphedema of both lower extremities      Magnesium Cl-Calcium Carbonate (SLOW-MAG PO) Take by mouth nightly      Lift Chair MISC by Does not apply route  Qty: 1 each, Refills: 0      acetaminophen (TYLENOL ARTHRITIS PAIN) 650 MG extended release tablet Take 1,300 mg by mouth every 8 hours as needed for Pain      CPAP Machine MISC by Does not apply route      levonorgestrel (MIRENA, 52 MG,) IUD 52 mg 1 each by Intrauterine route once      Misc.  Devices (GEL-FOAM CUSHION) MISC For chair  Qty: 1 each, Refills: 0         STOP taking these medications       azithromycin (ZITHROMAX Z-DRE) 250 MG tablet Comments:   Reason for Stopping:         lisinopril (PRINIVIL;ZESTRIL) 5 MG tablet Comments:   Reason for Stopping:             Activity: activity as tolerated  Diet: cardiac    Follow-up with 1wk PCP virtual visit    Note that over 30 minutes was spent in preparing discharge papers, discussing discharge with patient, staff, consultants, medication review, arranging follow up, etc.    Signed:  Jodie Franz MD  6/9/2021  2:27 PM

## 2021-06-07 NOTE — CARE COORDINATION
Social Work:    Received a call from [facility] at [facility] advising that they need updated therapy and feel confident they can obtain auth due to Emily's past history. Social work prompted for therapy updated and notified  Ray Crocker, as well as LSW Shruti, covering Emily's care today.      Electronically signed by MONSE Maldonado on 6/7/2021 at 9:08 AM

## 2021-06-07 NOTE — ED PROVIDER NOTES
Department of Emergency Medicine   ED  Provider Note  Admit Date/RoomTime: 5/31/2021  4:28 PM  ED Room: 29/0629-A          History of Present Illness:  6/7/21, Time: 8:09 AM EDT  Chief Complaint   Patient presents with    Generalized Body Aches     pt is from home- states she is weak and unable to eat x a week. Pt recently on Azythro. EMT states that pt needs adult services. House is unlivable for pt                 Drew Doe is a 63 y.o. female presenting to the ED for generalized weakness, beginning 2 weeks ago. The complaint has been constant, severe in severity, and worsened by recent respiratory infection. Patient states she started to have cough congestion shortness of breath which she talk to her physician about and he called her in a prescription for an unknown antibiotic. Since then she has only gotten worse with her shortness of breath and productive cough. She admits to chills denies fever. Denies chest pain. She states this is made her generally weak and fatigued to the point that she cannot get around her home anymore. Her home health aide agrees that she has had a harder time getting around the house. Patient denies any unilateral weakness or loss of function.     Review of Systems:   Pertinent positives and negatives are stated within HPI, all other systems reviewed and are negative.        --------------------------------------------- PAST HISTORY ---------------------------------------------  Past Medical History:  has a past medical history of Cerebral artery occlusion with cerebral infarction ([facility] [address] Depression, FLAVIO (generalized anxiety disorder), Hypertension, Hypoprothrombinemia ([facility] [address] Hypothyroidism, Lymphedema of both lower extremities, Morbid obesity with BMI of 60.0-69.9, adult ([facility] [address] Neuropathy, JOSE on CPAP, Osteoarthritis, PFO with atrial septal aneurysm, Pneumonia due to COVID-19 virus, Rt Hemiparesis following cerebrovascular accident (CVA) ([facility] [address] TIA (transient Percent 0.9 0 - 0 %    nRBC 0.0 /100 WBC   Comprehensive Metabolic Panel w/ Reflex to MG   Result Value Ref Range    Sodium 130 (L) 132 - 146 mmol/L    Potassium reflex Magnesium 3.9 3.5 - 5.0 mmol/L    Chloride 97 (L) 98 - 107 mmol/L    CO2 22 22 - 29 mmol/L    Anion Gap 11 7 - 16 mmol/L    Glucose 98 74 - 99 mg/dL    BUN 20 6 - 20 mg/dL    CREATININE 1.8 (H) 0.5 - 1.0 mg/dL    GFR Non-African American 29 >=60 mL/min/1.73    GFR African American 35     Calcium 8.2 (L) 8.6 - 10.2 mg/dL    Total Protein 7.2 6.4 - 8.3 g/dL    Albumin 3.2 (L) 3.5 - 5.2 g/dL    Total Bilirubin 0.4 0.0 - 1.2 mg/dL    Alkaline Phosphatase 66 35 - 104 U/L    ALT 17 0 - 32 U/L    AST 33 (H) 0 - 31 U/L   Troponin   Result Value Ref Range    Troponin, High Sensitivity 24 (H) 0 - 9 ng/L   Brain Natriuretic Peptide   Result Value Ref Range    Pro- (H) 0 - 125 pg/mL   Lactic Acid, Plasma   Result Value Ref Range    Lactic Acid 1.3 0.5 - 2.2 mmol/L   Protime-INR   Result Value Ref Range    Protime 83.6 (H) 9.3 - 12.4 sec    INR 7.5 (HH)    C-Reactive Protein   Result Value Ref Range    CRP 9.8 (H) 0.0 - 0.4 mg/dL   Ferritin   Result Value Ref Range    Ferritin 384 ng/mL   D-Dimer, Quantitative   Result Value Ref Range    D-Dimer, Quant 528 ng/mL DDU   Procalcitonin   Result Value Ref Range    Procalcitonin 0.20 (H) 0.00 - 0.08 ng/mL   Comprehensive Metabolic Panel w/ Reflex to MG   Result Value Ref Range    Sodium 135 132 - 146 mmol/L    Potassium reflex Magnesium 3.9 3.5 - 5.0 mmol/L    Chloride 102 98 - 107 mmol/L    CO2 22 22 - 29 mmol/L    Anion Gap 11 7 - 16 mmol/L    Glucose 93 74 - 99 mg/dL    BUN 21 (H) 6 - 20 mg/dL    CREATININE 1.7 (H) 0.5 - 1.0 mg/dL    GFR Non-African American 31 >=60 mL/min/1.73    GFR African American 37     Calcium 8.4 (L) 8.6 - 10.2 mg/dL    Total Protein 7.1 6.4 - 8.3 g/dL    Albumin 2.9 (L) 3.5 - 5.2 g/dL    Total Bilirubin 0.3 0.0 - 1.2 mg/dL    Alkaline Phosphatase 64 35 - 104 U/L    ALT 17 0 - 32 U/L - 34.5 %    RDW 16.9 (H) 11.5 - 15.0 fL    Platelets 316 840 - 096 E9/L    MPV 11.0 7.0 - 12.0 fL    Neutrophils % 82.0 (H) 43.0 - 80.0 %    Lymphocytes % 15.0 (L) 20.0 - 42.0 %    Monocytes % 3.0 2.0 - 12.0 %    Eosinophils % 0.0 0.0 - 6.0 %    Basophils % 0.0 0.0 - 2.0 %    Neutrophils Absolute 4.10 1.80 - 7.30 E9/L    Lymphocytes Absolute 0.75 (L) 1.50 - 4.00 E9/L    Monocytes Absolute 0.15 0.10 - 0.95 E9/L    Eosinophils Absolute 0.00 (L) 0.05 - 0.50 E9/L    Basophils Absolute 0.00 0.00 - 0.20 E9/L    Anisocytosis 1+    Basic Metabolic Panel   Result Value Ref Range    Sodium 139 132 - 146 mmol/L    Potassium 4.7 3.5 - 5.0 mmol/L    Chloride 107 98 - 107 mmol/L    CO2 23 22 - 29 mmol/L    Anion Gap 9 7 - 16 mmol/L    Glucose 132 (H) 74 - 99 mg/dL    BUN 22 (H) 6 - 20 mg/dL    CREATININE 1.4 (H) 0.5 - 1.0 mg/dL    GFR Non-African American 38 >=60 mL/min/1.73    GFR African American 47     Calcium 8.6 8.6 - 10.2 mg/dL   Troponin   Result Value Ref Range    Troponin, High Sensitivity 15 (H) 0 - 9 ng/L   Troponin   Result Value Ref Range    Troponin, High Sensitivity 14 (H) 0 - 9 ng/L   D-Dimer, Quantitative   Result Value Ref Range    D-Dimer, Quant 230 ng/mL DDU   Protime-INR   Result Value Ref Range    Protime 43.5 (H) 9.3 - 12.4 sec    INR 4.0    CBC Auto Differential   Result Value Ref Range    WBC 5.9 4.5 - 11.5 E9/L    RBC 4.46 3.50 - 5.50 E12/L    Hemoglobin 12.1 11.5 - 15.5 g/dL    Hematocrit 37.2 34.0 - 48.0 %    MCV 83.4 80.0 - 99.9 fL    MCH 27.1 26.0 - 35.0 pg    MCHC 32.5 32.0 - 34.5 %    RDW 16.7 (H) 11.5 - 15.0 fL    Platelets 523 497 - 908 E9/L    MPV 10.5 7.0 - 12.0 fL    Neutrophils % 86.0 (H) 43.0 - 80.0 %    Lymphocytes % 10.5 (L) 20.0 - 42.0 %    Monocytes % 2.6 2.0 - 12.0 %    Eosinophils % 0.0 0.0 - 6.0 %    Basophils % 0.0 0.0 - 2.0 %    Neutrophils Absolute 5.07 1.80 - 7.30 E9/L    Lymphocytes Absolute 0.65 (L) 1.50 - 4.00 E9/L    Monocytes Absolute 0.18 0.10 - 0.95 E9/L    Eosinophils Absolute 0.00 (L) 0.05 - 0.50 E9/L    Basophils Absolute 0.00 0.00 - 0.20 E9/L    Atypical Lymphocytes Relative 0.9 0.0 - 4.0 %    nRBC 0.0 /100 WBC    Anisocytosis 1+     Polychromasia 1+     Poikilocytes 1+     Ovalocytes 1+    Basic Metabolic Panel   Result Value Ref Range    Sodium 136 132 - 146 mmol/L    Potassium 4.5 3.5 - 5.0 mmol/L    Chloride 104 98 - 107 mmol/L    CO2 25 22 - 29 mmol/L    Anion Gap 7 7 - 16 mmol/L    Glucose 122 (H) 74 - 99 mg/dL    BUN 25 (H) 6 - 20 mg/dL    CREATININE 1.4 (H) 0.5 - 1.0 mg/dL    GFR Non-African American 38 >=60 mL/min/1.73    GFR African American 47     Calcium 8.7 8.6 - 10.2 mg/dL   Magnesium   Result Value Ref Range    Magnesium 1.9 1.6 - 2.6 mg/dL   Calcium, Ionized   Result Value Ref Range    Calcium, Ion 1.22 1.15 - 1.33 mmol/L   Hemoglobin A1C   Result Value Ref Range    Hemoglobin A1C 5.9 (H) 4.0 - 5.6 %   Phosphorus   Result Value Ref Range    Phosphorus 3.0 2.5 - 4.5 mg/dL   T4, Free   Result Value Ref Range    T4 Free <0.10 (L) 0.93 - 1.70 ng/dL   D-Dimer, Quantitative   Result Value Ref Range    D-Dimer, Quant <200 ng/mL DDU   Protime-INR   Result Value Ref Range    Protime 44.0 (H) 9.3 - 12.4 sec    INR 4.1    CBC Auto Differential   Result Value Ref Range    WBC 7.6 4.5 - 11.5 E9/L    RBC 4.64 3.50 - 5.50 E12/L    Hemoglobin 12.4 11.5 - 15.5 g/dL    Hematocrit 38.7 34.0 - 48.0 %    MCV 83.4 80.0 - 99.9 fL    MCH 26.7 26.0 - 35.0 pg    MCHC 32.0 32.0 - 34.5 %    RDW 17.0 (H) 11.5 - 15.0 fL    Platelets 694 123 - 677 E9/L    MPV 11.3 7.0 - 12.0 fL    Neutrophils % 85.0 (H) 43.0 - 80.0 %    Lymphocytes % 12.0 (L) 20.0 - 42.0 %    Monocytes % 3.0 2.0 - 12.0 %    Eosinophils % 0.0 0.0 - 6.0 %    Basophils % 0.0 0.0 - 2.0 %    Neutrophils Absolute 6.46 1.80 - 7.30 E9/L    Lymphocytes Absolute 0.91 (L) 1.50 - 4.00 E9/L    Monocytes Absolute 0.23 0.10 - 0.95 E9/L    Eosinophils Absolute 0.00 (L) 0.05 - 0.50 E9/L    Basophils Absolute 0.00 0.00 - 0.20 E9/L 8.8 8.6 - 10.2 mg/dL   T3, free   Result Value Ref Range    T3, Free 1.2 (L) 2.0 - 4.4 pg/mL   T4, free   Result Value Ref Range    T4 Free 2.01 (H) 0.93 - 1.70 ng/dL   T4, Free   Result Value Ref Range    T4 Free 1.72 (H) 0.93 - 1.70 ng/dL   T3, Free   Result Value Ref Range    T3, Free 1.2 (L) 2.0 - 4.4 pg/mL   Protime-INR   Result Value Ref Range    Protime 28.4 (H) 9.3 - 12.4 sec    INR 2.6    CBC Auto Differential   Result Value Ref Range    WBC 8.2 4.5 - 11.5 E9/L    RBC 4.72 3.50 - 5.50 E12/L    Hemoglobin 12.8 11.5 - 15.5 g/dL    Hematocrit 40.6 34.0 - 48.0 %    MCV 86.0 80.0 - 99.9 fL    MCH 27.1 26.0 - 35.0 pg    MCHC 31.5 (L) 32.0 - 34.5 %    RDW 17.5 (H) 11.5 - 15.0 fL    Platelets 647 036 - 056 E9/L    MPV 11.3 7.0 - 12.0 fL    Neutrophils % 81.9 (H) 43.0 - 80.0 %    Immature Granulocytes % 0.6 0.0 - 5.0 %    Lymphocytes % 12.4 (L) 20.0 - 42.0 %    Monocytes % 5.0 2.0 - 12.0 %    Eosinophils % 0.0 0.0 - 6.0 %    Basophils % 0.1 0.0 - 2.0 %    Neutrophils Absolute 6.73 1.80 - 7.30 E9/L    Immature Granulocytes # 0.05 E9/L    Lymphocytes Absolute 1.02 (L) 1.50 - 4.00 E9/L    Monocytes Absolute 0.41 0.10 - 0.95 E9/L    Eosinophils Absolute 0.00 (L) 0.05 - 0.50 E9/L    Basophils Absolute 0.01 0.00 - 0.20 O2/Z   Basic Metabolic Panel   Result Value Ref Range    Sodium 136 132 - 146 mmol/L    Potassium 5.1 (H) 3.5 - 5.0 mmol/L    Chloride 103 98 - 107 mmol/L    CO2 24 22 - 29 mmol/L    Anion Gap 9 7 - 16 mmol/L    Glucose 101 (H) 74 - 99 mg/dL    BUN 26 (H) 6 - 20 mg/dL    CREATININE 1.4 (H) 0.5 - 1.0 mg/dL    GFR Non-African American 38 >=60 mL/min/1.73    GFR African American 47     Calcium 8.4 (L) 8.6 - 10.2 mg/dL   EKG 12 Lead   Result Value Ref Range    Ventricular Rate 47 BPM    Atrial Rate 47 BPM    P-R Interval 144 ms    QRS Duration 128 ms    Q-T Interval 568 ms    QTc Calculation (Bazett) 502 ms    P Axis 54 degrees    R Axis 51 degrees    T Axis 9 degrees   EKG 12 Lead   Result Value Ref Range Ventricular Rate 42 BPM    Atrial Rate 42 BPM    P-R Interval 126 ms    QRS Duration 124 ms    Q-T Interval 582 ms    QTc Calculation (Bazett) 485 ms    P Axis 36 degrees    R Axis 44 degrees    T Axis 28 degrees   ,       RADIOLOGY:  Interpreted by Radiologist unless otherwise specified  CT CHEST WO CONTRAST   Final Result   1. Diffuse patchy ground-glass opacities related to bilateral pneumonia. 2.  Mediastinal lymphadenopathy. 3.  Cholelithiasis. XR CHEST PORTABLE   Final Result   Cardiomegaly and pulmonary vascular congestion. Focal opacity in the left midlung.                          ------------------------- NURSING NOTES AND VITALS REVIEWED ---------------------------   The nursing notes within the ED encounter and vital signs as below have been reviewed by myself  BP (!) 140/74   Pulse (!) 48   Temp 98.7 °F (37.1 °C) (Oral)   Resp 16   Ht 5' 3\" (1.6 m)   Wt (!) 350 lb (158.8 kg)   SpO2 98%   BMI 62.00 kg/m²     Oxygen Saturation Interpretation: Normal      The patients available past medical records and past encounters were reviewed.         ------------------------------ ED COURSE/MEDICAL DECISION MAKING----------------------  Medications   atorvastatin (LIPITOR) tablet 20 mg (20 mg Oral Given 6/6/21 1142)   baclofen (LIORESAL) tablet 5 mg (has no administration in time range)   benzonatate (TESSALON) capsule 100 mg (100 mg Oral Given 6/4/21 0937)   busPIRone (BUSPAR) tablet 15 mg (15 mg Oral Given 6/6/21 1739)   citalopram (CELEXA) tablet 40 mg (40 mg Oral Given 6/6/21 1142)   traMADol (ULTRAM) tablet 50 mg (has no administration in time range)   sodium chloride flush 0.9 % injection 5-40 mL (5 mLs Intravenous Not Given 6/6/21 2108)   sodium chloride flush 0.9 % injection 5-40 mL (has no administration in time range)   0.9 % sodium chloride infusion (has no administration in time range)   promethazine (PHENERGAN) tablet 12.5 mg (has no administration in time range)     Or ondansetron (ZOFRAN) injection 4 mg (has no administration in time range)   polyethylene glycol (GLYCOLAX) packet 17 g (has no administration in time range)   acetaminophen (TYLENOL) tablet 650 mg (650 mg Oral Given 6/1/21 0925)     Or   acetaminophen (TYLENOL) suppository 650 mg ( Rectal See Alternative 6/1/21 0925)   warfarin (COUMADIN) daily dosing (placeholder) (0 mLs Other Held 6/6/21 1723)   perflutren lipid microspheres (DEFINITY) injection 1.65 mg (has no administration in time range)   dexamethasone (DECADRON) tablet 6 mg (6 mg Oral Given 6/6/21 1142)   clopidogrel (PLAVIX) tablet 75 mg (75 mg Oral Given 6/6/21 1518)   levothyroxine (SYNTHROID) tablet 175 mcg (175 mcg Oral Given 6/6/21 2108)   cefTRIAXone (ROCEPHIN) 2,000 mg in sterile water 20 mL IV syringe (2,000 mg Intravenous Given 5/31/21 2034)   doxycycline (VIBRAMYCIN) 100 mg in dextrose 5 % 100 mL IVPB (0 mg Intravenous Stopped 5/31/21 2207)   acetaminophen (TYLENOL) tablet 650 mg (650 mg Oral Given 5/31/21 2032)   phytonadione (VITAMIN K) tablet 2.5 mg (2.5 mg Oral Given 5/31/21 2345)   warfarin (COUMADIN) tablet 3.5 mg (3.5 mg Oral Given 6/2/21 1721)                    Medical Decision Making:     I, Dr. Purvi Pedraza am the primary provider of record    Patient is unable to ambulate secondary to generalized weakness for weeks. She has equal strength sensation pulses to bilateral lower extremities however she is unable to carry herself likely secondary to infection. I am concerned for pneumonia causing this. I believe the patient benefit from admission for further evaluation and management. Discussed patient care with admitting physician. Re-Evaluations:       Exam unchanged.       This patient's ED course included: a personal history and physicial examination, re-evaluation prior to disposition, IV medications and complex medical decision making and emergency management    This patient has remained hemodynamically stable during their ED course. Counseling: The emergency provider has spoken with the patient and discussed todays results, in addition to providing specific details for the plan of care and counseling regarding the diagnosis and prognosis. Questions are answered at this time and they are agreeable with the plan.       --------------------------------- IMPRESSION AND DISPOSITION ---------------------------------    IMPRESSION  1. Pneumonia due to infectious organism, unspecified laterality, unspecified part of lung    2. Rt Hemiparesis following cerebrovascular accident (CVA) ([facility] [address]    3. Unable to ambulate        DISPOSITION  Disposition: Admit to med/surg floor  Patient condition is stable        NOTE: This report was transcribed using voice recognition software.  Every effort was made to ensure accuracy; however, inadvertent computerized transcription errors may be present     Erika De La Cruz MD  06/07/21 [id number]

## 2021-06-07 NOTE — PROGRESS NOTES
Occupational Therapy  OT BEDSIDE TREATMENT NOTE      Date:2021  Patient Name: Gonsalo Comer  MRN: [id number]  : 1960  Room: [address] [place], [place] [address]     Evaluating OT: Romero Lerma OTR/L   [id number]       Referring Merwin Riedel, MD    Specific Provider Orders/Date:OT eval and treat 2021       Diagnosis: Pneumonia   COVID 23    Pertinent Medical History: OA, lymphedema, CVA, R jessica,  Neuropathy    Precautions:  Fall Risk, DROPLET PLUS      Assessment of current deficits    [x]? Functional mobility            [x]?ADLs           [x]? Strength                  []?Cognition    [x]? Functional transfers          [x]? IADLs         [x]? Safety Awareness   [x]? Endurance    [x]? Fine Coordination                         [x]? Balance      []? Vision/perception   [x]? Sensation      []? Gross Motor Coordination             []? ROM           []? Delirium                   []? Motor Control      OT PLAN OF CARE   OT POC based on physician orders, patient diagnosis and results of clinical assessment     Frequency/Duration  1-3days/wk for 2 weeks PRN   Specific OT Treatment Interventions to include:   [x]? ?ADL retraining/adapted techniques and AE recommendations to increase functional independence within precautions                    [x]? ? Energy conservation techniques to improve tolerance for selfcare routine   [x]? ? Functional transfer/mobility training/DME recommendations for increased independence, safety and fall prevention         [x]? ?Patient/family education to increase safety and functional independence             [x]? ? Environmental modifications for safe mobility and completion of ADLs                             [x]? ? Therapeutic activity to improve functional performance during ADLs.                                         [x]? ? Therapeutic exercise to improve tolerance and functional strength for ADLs   [x]? ? Balance retraining/tolerance tasks for facilitation of postural control with dynamic challenges during complaints of SOB  Limited   Good  with ADL activity        Comments:  Pt laying in the bed. Motivated to go to the rehab facility. Returned to bed at end of the session. Education/treatment:  ADL retraining with facilitation of movement to increase self care skills. Therapeutic activity to address balance and endurance for ADL and transfers. Pt education of transfer safety. · Pt has made  progress towards set goals.        Time In: 3:20  Time Out: 3:52     Min Units   Therapeutic Ex [id number]     Therapeutic Activities [id number] 87 4   ADL/Self Care 70411 12 1   Orthotic Management [id number]     Neuro Re-Ed [id number]     Non-Billable Time     TOTAL TIMED TREATMENT [address] [facility] BARBARA/L [address]5450

## 2021-06-07 NOTE — PROGRESS NOTES
Pt refusing IV access, explained to her the importance of needing IV access.  She verbalized understanding

## 2021-06-08 LAB
ANION GAP SERPL CALCULATED.3IONS-SCNC: 8 MMOL/L (ref 7–16)
BASOPHILS ABSOLUTE: 0.01 E9/L (ref 0–0.2)
BASOPHILS RELATIVE PERCENT: 0.1 % (ref 0–2)
BUN BLDV-MCNC: 26 MG/DL (ref 6–20)
CALCIUM SERPL-MCNC: 8.6 MG/DL (ref 8.6–10.2)
CHLORIDE BLD-SCNC: 100 MMOL/L (ref 98–107)
CO2: 28 MMOL/L (ref 22–29)
CREAT SERPL-MCNC: 1.3 MG/DL (ref 0.5–1)
EKG ATRIAL RATE: 42 BPM
EKG ATRIAL RATE: 47 BPM
EKG P AXIS: 36 DEGREES
EKG P AXIS: 54 DEGREES
EKG P-R INTERVAL: 126 MS
EKG P-R INTERVAL: 144 MS
EKG Q-T INTERVAL: 568 MS
EKG Q-T INTERVAL: 582 MS
EKG QRS DURATION: 124 MS
EKG QRS DURATION: 128 MS
EKG QTC CALCULATION (BAZETT): 485 MS
EKG QTC CALCULATION (BAZETT): 502 MS
EKG R AXIS: 44 DEGREES
EKG R AXIS: 51 DEGREES
EKG T AXIS: 28 DEGREES
EKG T AXIS: 9 DEGREES
EKG VENTRICULAR RATE: 42 BPM
EKG VENTRICULAR RATE: 47 BPM
EOSINOPHILS ABSOLUTE: 0 E9/L (ref 0.05–0.5)
EOSINOPHILS RELATIVE PERCENT: 0 % (ref 0–6)
GFR AFRICAN AMERICAN: 51
GFR NON-AFRICAN AMERICAN: 42 ML/MIN/1.73
GLUCOSE BLD-MCNC: 152 MG/DL (ref 74–99)
HCT VFR BLD CALC: 42.8 % (ref 34–48)
HEMOGLOBIN: 13.8 G/DL (ref 11.5–15.5)
IMMATURE GRANULOCYTES #: 0.11 E9/L
IMMATURE GRANULOCYTES %: 0.9 % (ref 0–5)
INR BLD: 2
LYMPHOCYTES ABSOLUTE: 0.86 E9/L (ref 1.5–4)
LYMPHOCYTES RELATIVE PERCENT: 7.1 % (ref 20–42)
MCH RBC QN AUTO: 27.5 PG (ref 26–35)
MCHC RBC AUTO-ENTMCNC: 32.2 % (ref 32–34.5)
MCV RBC AUTO: 85.3 FL (ref 80–99.9)
MONOCYTES ABSOLUTE: 0.44 E9/L (ref 0.1–0.95)
MONOCYTES RELATIVE PERCENT: 3.7 % (ref 2–12)
NEUTROPHILS ABSOLUTE: 10.61 E9/L (ref 1.8–7.3)
NEUTROPHILS RELATIVE PERCENT: 88.2 % (ref 43–80)
PDW BLD-RTO: 17.8 FL (ref 11.5–15)
PLATELET # BLD: 302 E9/L (ref 130–450)
PMV BLD AUTO: 11.2 FL (ref 7–12)
POTASSIUM SERPL-SCNC: 4.6 MMOL/L (ref 3.5–5)
PROTHROMBIN TIME: 21.6 SEC (ref 9.3–12.4)
RBC # BLD: 5.02 E12/L (ref 3.5–5.5)
SODIUM BLD-SCNC: 136 MMOL/L (ref 132–146)
WBC # BLD: 12 E9/L (ref 4.5–11.5)

## 2021-06-08 PROCEDURE — 80048 BASIC METABOLIC PNL TOTAL CA: CPT

## 2021-06-08 PROCEDURE — 85610 PROTHROMBIN TIME: CPT

## 2021-06-08 PROCEDURE — 85025 COMPLETE CBC W/AUTO DIFF WBC: CPT

## 2021-06-08 PROCEDURE — 6370000000 HC RX 637 (ALT 250 FOR IP): Performed by: INTERNAL MEDICINE

## 2021-06-08 PROCEDURE — 1200000000 HC SEMI PRIVATE

## 2021-06-08 PROCEDURE — 6360000002 HC RX W HCPCS: Performed by: INTERNAL MEDICINE

## 2021-06-08 PROCEDURE — 94660 CPAP INITIATION&MGMT: CPT

## 2021-06-08 PROCEDURE — 36415 COLL VENOUS BLD VENIPUNCTURE: CPT

## 2021-06-08 RX ORDER — WARFARIN SODIUM 5 MG/1
5 TABLET ORAL
Status: COMPLETED | OUTPATIENT
Start: 2021-06-08 | End: 2021-06-08

## 2021-06-08 RX ADMIN — BUSPIRONE HYDROCHLORIDE 15 MG: 5 TABLET ORAL at 17:00

## 2021-06-08 RX ADMIN — ATORVASTATIN CALCIUM 20 MG: 20 TABLET, FILM COATED ORAL at 08:33

## 2021-06-08 RX ADMIN — CLOPIDOGREL BISULFATE 75 MG: 75 TABLET ORAL at 08:34

## 2021-06-08 RX ADMIN — WARFARIN SODIUM 5 MG: 5 TABLET ORAL at 18:00

## 2021-06-08 RX ADMIN — LEVOTHYROXINE SODIUM 125 MCG: 125 TABLET ORAL at 20:07

## 2021-06-08 RX ADMIN — DEXAMETHASONE 6 MG: 4 TABLET ORAL at 08:33

## 2021-06-08 RX ADMIN — BUSPIRONE HYDROCHLORIDE 15 MG: 5 TABLET ORAL at 08:34

## 2021-06-08 RX ADMIN — CITALOPRAM HYDROBROMIDE 40 MG: 20 TABLET ORAL at 08:34

## 2021-06-08 ASSESSMENT — PAIN SCALES - GENERAL
PAINLEVEL_OUTOF10: 0

## 2021-06-08 ASSESSMENT — PAIN SCALES - WONG BAKER: WONGBAKER_NUMERICALRESPONSE: 0

## 2021-06-08 NOTE — CARE COORDINATION
Social work / Discharge planning:      [place] [address]. CM from [place] informed social work that precert has been denied. Information provided for peer to peer [phone]. Option #5. Reference # [id number]. Must by done by 10:30am on 6/9/21. The physician for peer to peer is available until 7pm.  RN will update physician.   Electronically signed by MONSE Marcos on 6/8/2021 at 3:01 PM

## 2021-06-08 NOTE — CARE COORDINATION
Social work / Discharge Planning:       RN updated social work that the physician recommends [facility] rather than peer to peer for [facility]. [place] liaison informed. Social work spoke to the patient who absolutely refuses [facility]. She informed social work that her home caregivers can start seeing her at home starting tomorrow. Social work attempted to call her  Ana Luisa Palta [phone] to confirm this but her voicemail box is full. Patient states that she is still interested in home care from [facility]. Per the liaison from [facility], they are still following the patient and can accept. Patient states that she will need a ambulance transport home tomorrow. Ambulance form completed. RN updated that per patient, she can discharge home tomorrow with her usual aid services but social work cannot confirm this because the  voicemail box is full.   Electronically signed by MONSE Magana on 6/8/2021 at 4:03 PM

## 2021-06-08 NOTE — PROGRESS NOTES
Call placed to Dr. Wang Donnelly re: [facility] denying pt. Offered information for Peer to Peer Dr. Wang Donnelly declined. States pt should go to SNF. Call placed to [facility].

## 2021-06-08 NOTE — PROGRESS NOTES
PROGRESS NOTE       PATIENT PROBLEM LIST:  Principal Problem:    Pneumonia due to COVID-19 virus  Active Problems:    Hypothyroidism    JOSE on CPAP    HTN (hypertension)    PETRA (acute kidney injury) ([facility])    PFO with atrial septal aneurysm    Rt Hemiparesis following cerebrovascular accident (CVA) ([facility])    Hemiparesis affecting right side as late effect of cerebrovascular accident (CVA) ([facility]    CKD (chronic kidney disease) stage 3, GFR 30-59 ml/min ([facility])    Morbid obesity with BMI of 60.0-69.9, adult ([facility]    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 *      SUBJECTIVE:  Jennifer Salinas states She feels somewhat better and is looking forward to being discharged. She denies any chest discomfort nor significant shortness of breath presently. REVIEW OF SYSTEMS:  General ROS: negative for - fatigue, malaise,  weight gain or weight loss  Psychological ROS: negative for - anxiety , depression  Ophthalmic ROS: negative for - decreased vision or visual distortion. ENT ROS: negative  Allergy and Immunology ROS: negative  Hematological and Lymphatic ROS: negative  Endocrine: no heat or cold intolerance and no polyphagia, polydipsia, or polyuria  Respiratory ROS: negative for - hemoptysis, pleuritic pain and wheezing  Cardiovascular ROS: positive for - irregular heartbeat and rapid heart rate. Gastrointestinal ROS: no abdominal pain, change in bowel habits, or black or bloody stools  Genito-Urinary ROS: no nocturia, dysuria, trouble voiding, frequency or hematuria  Musculoskeletal ROS: negative for- myalgias, arthralgias, or claudication  Neurological ROS: no TIA or stroke symptoms otherwise no significant change in symptoms or problems since yesterday as documented in previous progress notes.     SCHEDULED MEDICATIONS:   levothyroxine  125 mcg Oral Nightly    clopidogrel  75 mg Oral Daily    dexamethasone  6 mg Oral Daily    warfarin (COUMADIN) daily dosing (placeholder)   Other 06/05/21  0440 06/06/21  0515 06/07/21  0558   INR 4.1 3.4 2.6     PRO-BNP:   Lab Results   Component Value Date    PROBNP 599 (H) 05/31/2021      TSH:   Lab Results   Component Value Date    TSH 0.026 (L) 06/02/2021      Cardiac Injury Profile: No results for input(s): CKTOTAL, CKMB, TROPONINI in the last 72 hours. Lipid Profile:   Lab Results   Component Value Date    TRIG 97 04/06/2021    HDL 44 04/06/2021    LDLCALC 46 04/06/2021    CHOL 109 04/06/2021      Hemoglobin A1C: No components found for: HGBA1C     RAD:   Echo Complete    Result Date: 6/4/2021  Transthoracic Echocardiography Report (TTE)  Demographics   Patient Name       Dominick BOURNE Gender               Female   Medical Record     [id number]      Room Number          2877  Number   Account #          [de-identified]     Procedure Date       06/03/2021   Corporate ID                     Ordering Physician   July Fernandez   Accession Number   [id number]    Referring Physician  Vicenta Mojica   Date of Birth      1960    Sonographer          [address] [place] [address]   Age                63 year(s)    Interpreting         Margarito Falk DO                                   Physician                                    Any Other  Procedure Type of Study   TTE procedure:Echo Complete W/Doppler & Color Flow. Procedure Date Date: 06/03/2021 Start: 02:38 PM Study Location: Portable Technical Quality: Poor visualization due to body habitus. Indications:Bradycardia. Patient Status: Routine Contrast Medium: Definity. Height: 63 inches Weight: 350 pounds BSA: 2.45 m^2 BMI: 62 kg/m^2 HR: 46 bpm BP: 140/68 mmHg  Findings   Left Ventricle  Left ventricle grossly normal in size. Normal LV segmental wall motion. Normal left ventricular wall thickness. Estimated left ventricular ejection fraction is 58±5%. Does not meet 50% criteria for diastolic dysfunction. Right Ventricle  Mildly dilated right ventricle. Right ventricular segmental wall motion is normal.  TAPSE is normal   Left Atrium  The left atrium is mildly dilated. The LAESV Index is <34ml/m2. Interatrial septum appears intact. Right Atrium  Right atrium is normal in size. Mitral Valve  Structurally normal mitral valve. Physiologic and/or trace mitral regurgitation is present. Tricuspid Valve  The tricuspid valve appears structurally normal.  Physiologic and/or trace tricuspid regurgitation. RVSP is 45 mmHg. Pulmonary hypertension is mild . Aortic Valve  The morphology of the aortic valve could not be determined from the images  obtained. Aortic valve opens well. The aortic valve appears mildly sclerotic. No doppler evidence of aortic stenosis or regurgitation. Pulmonic Valve  Pulmonic valve is structurally normal.  No evidence of pulmonic regurgitation. Pericardial Effusion  No evidence of pericardial thickening/calcification present. Aorta  Aortic root dimension within normal limits. Miscellaneous  Dilated Inferior Vena Cava. Inferior Vena Cava, <50% respiratory variation. Conclusions   Summary  Left ventricle grossly normal in size. Normal LV segmental wall motion. Normal left ventricular wall thickness. Estimated left ventricular ejection fraction is 58±5%. Does not meet 50% criteria for diastolic dysfunction. The LAESV Index is <34ml/m2. Mildly dilated right ventricle. Right ventricular segmental wall motion is normal.  TAPSE is normal  Physiologic and/or trace mitral regurgitation is present. Physiologic and/or trace tricuspid regurgitation. RVSP is 45 mmHg. Pulmonary hypertension is mild . Technically suboptimal quality study. Technically difficult study due to patient''s body habitus. Compared to prior echo, no significant changes noted. Suggest clinical correlation.    Signature   ----------------------------------------------------------------  Electronically signed by Jl Andres DO(Interpreting  physician) Velocity: 0.1 m/s  MV E' Lateral  Velocity: 11 m/s  [email or web address] [id number]    CT CHEST WO CONTRAST    Result Date: 5/31/2021  EXAMINATION: CT OF THE CHEST WITHOUT CONTRAST 5/31/2021 9:25 pm TECHNIQUE: CT of the chest was performed without the administration of intravenous contrast. Multiplanar reformatted images are provided for review. Dose modulation, iterative reconstruction, and/or weight based adjustment of the mA/kV was utilized to reduce the radiation dose to as low as reasonably achievable. COMPARISON: July 8, 2015 HISTORY: ORDERING SYSTEM PROVIDED HISTORY: COVID+, CXR not helpful due to BMI TECHNOLOGIST PROVIDED HISTORY: Reason for exam:->COVID+, CXR not helpful due to BMI FINDINGS: Patchy ground-glass opacities throughout both lungs notable toward lung periphery. No pleural effusion. No pneumothorax. There are multiple prominent and enlarged mediastinal lymph nodes notable in left paratracheal location measuring up to 2.1 x 2 cm and anterior mediastinum on axial image number 45 measuring 1.4 x 1.7 cm. The heart is mildly enlarged. No pericardial effusion. View of the upper abdomen shows normal bilateral adrenal glands. Calcified gallstone located in the gallbladder measures up to 1.3 cm. Atrophy of visualized right kidney with nonobstructing calculus. 1.  Diffuse patchy ground-glass opacities related to bilateral pneumonia. 2.  Mediastinal lymphadenopathy. 3.  Cholelithiasis. XR CHEST PORTABLE    Result Date: 5/31/2021  EXAMINATION: ONE XRAY VIEW OF THE CHEST 5/31/2021 5:32 pm COMPARISON: None. HISTORY: ORDERING SYSTEM PROVIDED HISTORY: cough TECHNOLOGIST PROVIDED HISTORY: Reason for exam:->cough FINDINGS: The heart is enlarged. There is focal opacity in the left midlung. Pulmonary vessels are congested. No pneumothorax. Costophrenic angles are clear.      Cardiomegaly and pulmonary vascular congestion. Focal opacity in the left midlung. EKG: See Report  Echo: See Report      IMPRESSIONS:  Principal Problem:    Pneumonia due to COVID-19 virus  Active Problems:    Hypothyroidism    JOSE on CPAP    HTN (hypertension)    PETRA (acute kidney injury) ([facility]    PFO with atrial septal aneurysm    Rt Hemiparesis following cerebrovascular accident (CVA) ([facility]    Hemiparesis affecting right side as late effect of cerebrovascular accident (CVA) ([facility]    CKD (chronic kidney disease) stage 3, GFR 30-59 ml/min ([facility])    Morbid obesity with BMI of 60.0-69.9, adult ([facility]    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 *      RECOMMENDATIONS:  From a cardiac standpoint I see no reason why Mrs. Castro could not be discharged to a rehabilitation center. Fortunately she remains in sinus rhythm. Adjust all medications in accordance with renal function. I have spent more than 30 minutes face to face with Sagar Jain and reviewing notes and laboratory data, with greater than 50% of this time instructing and counseling the patient  face to face regarding my findings and recommendations and I have answered all questions as posed to me by Ms. Carreno. Marilou Robert, DO FACP,FACC,FSCAI      NOTE:  This report was transcribed using voice recognition software.   Every effort was made to ensure accuracy; however, inadvertent computerized transcription errors may be present

## 2021-06-08 NOTE — PROGRESS NOTES
P Quality Flow/Interdisciplinary Rounds Progress Note        Quality Flow Rounds held on June 8, 2021    Disciplines Attending:  Bedside Nurse, ,  and Nursing Unit Leadership    Melissa Johnson was admitted on 5/31/2021  4:28 PM    Anticipated Discharge Date:  Expected Discharge Date: 06/08/21    Disposition:    Roberto Score:  Roberto Scale Score: 15    Readmission Risk              Risk of Unplanned Readmission:  18           Discussed patient goal for the day, patient clinical progression, and barriers to discharge. The following Goal(s) of the Day/Commitment(s) have been identified:  Discharge planning.        Shanti Castano RN  June 8, 2021

## 2021-06-08 NOTE — PROGRESS NOTES
Spoke with the patient, pt states that she has spoke with her CM and Aide service, states they will be able to come to her house tomorrow to work with her.

## 2021-06-08 NOTE — PROGRESS NOTES
Pharmacy Consultation Note  (Warfarin Dosing and Monitoring)    Initial consult date: 6/1  Consulting physician: Tamiko Vaca    Allergies:  Pcn [penicillins] and Penicillin g    63 y.o. female    Ht Readings from Last 1 Encounters:   06/08/21 5' 3\" (1.6 m)     Wt Readings from Last 1 Encounters:   05/31/21 (!) 350 lb (158.8 kg)         Warfarin Indication Target   INR [place] Dose  (if applicable) Diet/Feeding Tube   (Enteral feeds, nutritional drinks, increased Vitamin K in diet can decrease INR)   H/o CVA 2-3 Recent changes by PCP: 5mg daily from 2.5mg TRS, 5mg all others General       x Home Med? Meds Increasing INR x Home Med?  Meds Decreasing INR     Allopurinol    Azathioprine     Amiodarone/Propafenone/Dronedarone   Carbamazepine     Androgens   Cholestyramine     Chemotherapy (BBW: Capecitabine)   Estrogen     Ciprofloxacin/Levofloxacin   Nafcillin/Dicloxacillin     Clarithromycin/Erythromycin/Azithromycin   Barbiturates      Fluconazole/Itraconazole/Voriconazole/Ketoconazole   Phenytoin (Variable)     Metronidazole   Rifampin     Phenytoin (Variable)   Steroids (Variable/Dose Dependent)   X   Statins/Fenofibrate/Gemfibrozil   Sucralfate     Steroids (Variable/Dose Dependent)   Other:     Sulfamethoxazole/Trimethoprim        Tramadol         Other:        Comments regarding medication interactions:      x Diseases Affecting INR x Increased Bleeding Risk    CHF Exacerbation (Increases)  History GI Bleed/PUD    Liver Disease (Increases)  Chronic NSAID Use    Thyroid: Hyper (Increases)  Hypo (Decreases)  Chronic ASA/Antiplatelet Use (Clopidogrel/ Dipyridamole/Prasugrel/Ticagrelor)      Malignancy (Increases)  Abnormal Renal Function (dialysis, renal transplant, SCr ? 2.3 mg/dL)     History of EtOH Abuse: Acute (Increases)   Chronic (Decreases)  Liver Function (cirrhosis, bilirubin >2x ULN with AST/ALT/AP >3x ULN)   X Fever (Increases)  Age > 65 years   X Acute infection (Increases)  Hypertension/Uncontrolled BP   X

## 2021-06-08 NOTE — CARE COORDINATION
Social work / Discharge Planning:       [facility]. Awaiting precert for [facility]. N-17 and ambulance form completed.    Electronically signed by MONSE Lane on 6/8/2021 at 10:36 AM

## 2021-06-08 NOTE — PROGRESS NOTES
Comprehensive Nutrition Assessment    Type and Reason for Visit:  Reassess    Nutrition Recommendations/Plan: Continue current diet to meet nutritional needs. Nutrition Assessment:  Pt adm with Pneumonia due to COVID-19 virus. PO intake has improved to %. No more nausea or poor appetite. Pt declinded any ONS. Pt is getting ready for discharge to rehab facility today. PMHx of hypothyroidism, JOSE,HTN, CKD stage 3. Malnutrition Assessment:  Malnutrition Status:  Insufficient data    Context:  Acute Illness     Findings of the 6 clinical characteristics of malnutrition:  Energy Intake:  Mild decrease in energy intake (Comment) (per pt (on phone) poor po and appetite for 1wk)  Weight Loss:  Unable to assess     Body Fat Loss:  Unable to assess (COVID isolation)     Muscle Mass Loss:  Unable to assess (COVID isolation)    Fluid Accumulation:  Unable to assess (COVID isolation)     Strength:  Not Performed    Estimated Daily Nutrient Needs:  Energy (kcal):  6741-7360 (10-11kcal/kg stated BW); Weight Used for Energy Requirements:   (stated wt)     Protein (g):  70-80g (1.3-1.5g/kg IBW as tolerated d/t CKD stage 3); Weight Used for Protein Requirements:  Ideal        Fluid (ml/day):  ; Method Used for Fluid Requirements:  1 ml/kcal      Nutrition Related Findings:  A&Ox4, Abd WDL, -I/Os, Edema BLE non pitting      Wounds:  None       Current Nutrition Therapies:    DIET GENERAL;     Anthropometric Measures:  · Height: 5' 3\" (160 cm)  · Current Body Weight: 350 lb (158.8 kg) (5/31 stated)   · Admission Body Weight: 350 lb (158.8 kg) (5/31 stated)    · Usual Body Weight:  (UTO per EMR)     · Ideal Body Weight: 115 lbs; % Ideal Body Weight 304.3 %   · BMI: 62    · BMI Categories: Obese Class 3 (BMI 40.0 or greater)       Nutrition Diagnosis:   · Increased nutrient needs related to impaired respiratory function (COVID) as evidenced by poor intake prior to admission    Nutrition Interventions:   Food and/or Nutrient Delivery:  Continue Current Diet (pt declined ONS)  Nutrition Education/Counseling:  No recommendation at this time   Coordination of Nutrition Care:  Continue to monitor while inpatient    Goals:  Continue intake at >75% of meals       Nutrition Monitoring and Evaluation:   Behavioral-Environmental Outcomes:  None Identified   Food/Nutrient Intake Outcomes:  Food and Nutrient Intake  Physical Signs/Symptoms Outcomes:  Biochemical Data, Diarrhea, GI Status, Nausea or Vomiting, Fluid Status or Edema, Nutrition Focused Physical Findings, Skin, Weight     Discharge Planning:     Too soon to determine     Electronically signed by Cody Garcia RD, LD on 6/8/21 at 10:12 AM EDT    Contact: [phone]

## 2021-06-08 NOTE — PROGRESS NOTES
Problem: Goal Outcome Summary  Goal: Goal Outcome Summary  Outcome: No Change  Patient is A/O through shift with some confusion to situations. Sinus rhythm on my shift but previously in am was in v.fib in the morning. Headache during this shift which was treated with tylenol and ipubproen. CT results showed PNA, treatment with zosyn. Oxygen stats went to low 70s and finally stayed stable at 5L nasal canula, check flowsheet for times and amounts of Spo2. Continue to use bed alarms. Continue to monitor.        Spoke with patient to let her know that her precert has been denied. She is unable to have her aide come in to her home until she is out of quarantine. Tomorrow will be day 10 that she had a positive covid result. Discussed with charge.

## 2021-06-09 VITALS
SYSTOLIC BLOOD PRESSURE: 142 MMHG | HEART RATE: 52 BPM | WEIGHT: 293 LBS | TEMPERATURE: 97.9 F | RESPIRATION RATE: 16 BRPM | OXYGEN SATURATION: 98 % | DIASTOLIC BLOOD PRESSURE: 66 MMHG | HEIGHT: 63 IN | BODY MASS INDEX: 51.91 KG/M2

## 2021-06-09 LAB
ANION GAP SERPL CALCULATED.3IONS-SCNC: 8 MMOL/L (ref 7–16)
BASOPHILS ABSOLUTE: 0.01 E9/L (ref 0–0.2)
BASOPHILS RELATIVE PERCENT: 0.1 % (ref 0–2)
BUN BLDV-MCNC: 25 MG/DL (ref 6–20)
CALCIUM SERPL-MCNC: 8.6 MG/DL (ref 8.6–10.2)
CHLORIDE BLD-SCNC: 99 MMOL/L (ref 98–107)
CO2: 27 MMOL/L (ref 22–29)
CREAT SERPL-MCNC: 1.2 MG/DL (ref 0.5–1)
EOSINOPHILS ABSOLUTE: 0.01 E9/L (ref 0.05–0.5)
EOSINOPHILS RELATIVE PERCENT: 0.1 % (ref 0–6)
GFR AFRICAN AMERICAN: 56
GFR NON-AFRICAN AMERICAN: 46 ML/MIN/1.73
GLUCOSE BLD-MCNC: 173 MG/DL (ref 74–99)
HCT VFR BLD CALC: 41.4 % (ref 34–48)
HEMOGLOBIN: 13.2 G/DL (ref 11.5–15.5)
IMMATURE GRANULOCYTES #: 0.06 E9/L
IMMATURE GRANULOCYTES %: 0.5 % (ref 0–5)
INR BLD: 2.5
LYMPHOCYTES ABSOLUTE: 0.69 E9/L (ref 1.5–4)
LYMPHOCYTES RELATIVE PERCENT: 6.2 % (ref 20–42)
MCH RBC QN AUTO: 26.7 PG (ref 26–35)
MCHC RBC AUTO-ENTMCNC: 31.9 % (ref 32–34.5)
MCV RBC AUTO: 83.6 FL (ref 80–99.9)
MONOCYTES ABSOLUTE: 0.47 E9/L (ref 0.1–0.95)
MONOCYTES RELATIVE PERCENT: 4.2 % (ref 2–12)
NEUTROPHILS ABSOLUTE: 9.86 E9/L (ref 1.8–7.3)
NEUTROPHILS RELATIVE PERCENT: 88.9 % (ref 43–80)
PDW BLD-RTO: 17.6 FL (ref 11.5–15)
PLATELET # BLD: 268 E9/L (ref 130–450)
PMV BLD AUTO: 11.3 FL (ref 7–12)
POTASSIUM SERPL-SCNC: 4.5 MMOL/L (ref 3.5–5)
PROTHROMBIN TIME: 26.5 SEC (ref 9.3–12.4)
RBC # BLD: 4.95 E12/L (ref 3.5–5.5)
SODIUM BLD-SCNC: 134 MMOL/L (ref 132–146)
WBC # BLD: 11.1 E9/L (ref 4.5–11.5)

## 2021-06-09 PROCEDURE — 36415 COLL VENOUS BLD VENIPUNCTURE: CPT

## 2021-06-09 PROCEDURE — 94660 CPAP INITIATION&MGMT: CPT

## 2021-06-09 PROCEDURE — 6370000000 HC RX 637 (ALT 250 FOR IP): Performed by: INTERNAL MEDICINE

## 2021-06-09 PROCEDURE — 6360000002 HC RX W HCPCS: Performed by: INTERNAL MEDICINE

## 2021-06-09 PROCEDURE — 85610 PROTHROMBIN TIME: CPT

## 2021-06-09 PROCEDURE — 80048 BASIC METABOLIC PNL TOTAL CA: CPT

## 2021-06-09 PROCEDURE — 85025 COMPLETE CBC W/AUTO DIFF WBC: CPT

## 2021-06-09 RX ORDER — WARFARIN SODIUM 5 MG/1
5 TABLET ORAL
Status: DISCONTINUED | OUTPATIENT
Start: 2021-06-09 | End: 2021-06-09 | Stop reason: HOSPADM

## 2021-06-09 RX ORDER — LEVOTHYROXINE SODIUM 0.15 MG/1
150 TABLET ORAL DAILY
Qty: 30 TABLET | Refills: 1 | Status: SHIPPED
Start: 2021-06-09 | End: 2021-06-15 | Stop reason: SDUPTHER

## 2021-06-09 RX ORDER — CLOPIDOGREL BISULFATE 75 MG/1
75 TABLET ORAL DAILY
Qty: 30 TABLET | Refills: 0 | Status: SHIPPED
Start: 2021-06-10 | End: 2021-06-15 | Stop reason: SDUPTHER

## 2021-06-09 RX ADMIN — BUSPIRONE HYDROCHLORIDE 15 MG: 5 TABLET ORAL at 10:18

## 2021-06-09 RX ADMIN — CITALOPRAM HYDROBROMIDE 40 MG: 20 TABLET ORAL at 10:18

## 2021-06-09 RX ADMIN — DEXAMETHASONE 6 MG: 4 TABLET ORAL at 10:18

## 2021-06-09 RX ADMIN — CLOPIDOGREL BISULFATE 75 MG: 75 TABLET ORAL at 10:18

## 2021-06-09 ASSESSMENT — PAIN SCALES - GENERAL
PAINLEVEL_OUTOF10: 0

## 2021-06-09 NOTE — PROGRESS NOTES
Secure telephone message left for Dr Mir Comes regarding completion of discharge med reconciliation. Awaiting response/orders.

## 2021-06-09 NOTE — PROGRESS NOTES
P Quality Flow/Interdisciplinary Rounds Progress Note        Quality Flow Rounds held on June 9, 2021    Disciplines Attending:  Bedside Nurse and     Guerda Whatley was admitted on 5/31/2021  4:28 PM    Anticipated Discharge Date:  Expected Discharge Date: 06/08/21    Disposition:    Roberto Score:  Roberto Scale Score: 14    Readmission Risk              Risk of Unplanned Readmission:  16           Discussed patient goal for the day, patient clinical progression, and barriers to discharge. The following Goal(s) of the Day/Commitment(s) have been identified:  discharge today.       Nahid Hawkins RN  June 9, 2021

## 2021-06-09 NOTE — PLAN OF CARE
Problem: Airway Clearance - Ineffective  Goal: Achieve or maintain patent airway  Outcome: Met This Shift     Problem: Gas Exchange - Impaired  Goal: Absence of hypoxia  Outcome: Met This Shift  Goal: Promote optimal lung function  Outcome: Met This Shift     Problem: Breathing Pattern - Ineffective  Goal: Ability to achieve and maintain a regular respiratory rate  Outcome: Met This Shift     Problem:  Body Temperature -  Risk of, Imbalanced  Goal: Ability to maintain a body temperature within defined limits  Outcome: Met This Shift  Goal: Will regain or maintain usual level of consciousness  Outcome: Met This Shift  Goal: Complications related to the disease process, condition or treatment will be avoided or minimized  Outcome: Met This Shift     Problem: Isolation Precautions - Risk of Spread of Infection  Goal: Prevent transmission of infection  Outcome: Met This Shift     Problem: Nutrition Deficits  Goal: Optimize nutritional status  Outcome: Met This Shift     Problem: Risk for Fluid Volume Deficit  Goal: Maintain normal heart rhythm  Outcome: Met This Shift  Goal: Maintain absence of muscle cramping  Outcome: Met This Shift  Goal: Maintain normal serum potassium, sodium, calcium, phosphorus, and pH  Outcome: Met This Shift     Problem: Loneliness or Risk for Loneliness  Goal: Demonstrate positive use of time alone when socialization is not possible  Outcome: Met This Shift     Problem: Fatigue  Goal: Verbalize increase energy and improved vitality  Outcome: Met This Shift     Problem: Patient Education: Go to Patient Education Activity  Goal: Patient/Family Education  Outcome: Met This Shift     Problem: Skin Integrity:  Goal: Will show no infection signs and symptoms  Description: Will show no infection signs and symptoms  Outcome: Met This Shift  Goal: Absence of new skin breakdown  Description: Absence of new skin breakdown  Outcome: Met This Shift     Problem: Falls - Risk of:  Goal: Will remain free

## 2021-06-09 NOTE — PROGRESS NOTES
Subjective:  Feeling better   No CP or SOB  No fever or chills   No uncontrolled pain  No vomiting or diarrhea     Objective:    BP (!) 142/66   Pulse 50   Temp 97.9 °F (36.6 °C) (Oral)   Resp 16   Ht 5' 3\" (1.6 m)   Wt (!) 350 lb (158.8 kg)   SpO2 98%   BMI 62.00 kg/m²     24HR INTAKE/OUTPUT:      Intake/Output Summary (Last 24 hours) at 6/9/2021 1013  Last data filed at 6/9/2021 0445  Gross per 24 hour   Intake --   Output 800 ml   Net -800 ml       General appearance: NAD, conversant obese  Neck: FROM, supple   Lungs: Clear bilaterally no wheezes, no rhonchi, no crackles  CV: RRR, no MRGs; normal carotid upstroke and amplitude without Bruits  Abdomen: Soft, non-tender; no masses or HSM  Extremities: No edema, no cyanosis, no clubbing  Skin: Intact no rash, no lesions, no ulcers    Psych: Alert and oriented normal affect  Neuro: Nonfocal  Most Recent Labs  Lab Results   Component Value Date    WBC 12.0 (H) 06/08/2021    HGB 13.8 06/08/2021    HCT 42.8 06/08/2021     06/08/2021     06/08/2021    K 4.6 06/08/2021     06/08/2021    CREATININE 1.3 (H) 06/08/2021    BUN 26 (H) 06/08/2021    CO2 28 06/08/2021    GLUCOSE 152 (H) 06/08/2021    ALT 17 06/01/2021    AST 37 (H) 06/01/2021    INR 2.0 06/08/2021    TSH 0.026 (L) 06/02/2021    LABA1C 5.9 (H) 06/04/2021    LABMICR <12.0 04/26/2017     No results for input(s): MG in the last 72 hours. Lab Results   Component Value Date    CALCIUM 8.6 06/08/2021    PHOS 3.1 06/05/2021        CT CHEST WO CONTRAST   Final Result   1. Diffuse patchy ground-glass opacities related to bilateral pneumonia. 2.  Mediastinal lymphadenopathy. 3.  Cholelithiasis. XR CHEST PORTABLE   Final Result   Cardiomegaly and pulmonary vascular congestion. Focal opacity in the left midlung.              Assessment    Principal Problem:    Pneumonia due to COVID-19 virus  Active Problems:    Hypothyroidism    JOSE on CPAP    HTN (hypertension)    PETRA

## 2021-06-09 NOTE — PROGRESS NOTES
Pharmacy Consultation Note  (Warfarin Dosing and Monitoring)    Initial consult date: 6/1  Consulting physician: Ashley Cortez    Allergies:  Pcn [penicillins] and Penicillin g    63 y.o. female    Ht Readings from Last 1 Encounters:   06/08/21 5' 3\" (1.6 m)     Wt Readings from Last 1 Encounters:   05/31/21 (!) 350 lb (158.8 kg)         Warfarin Indication Target   INR [place] Dose  (if applicable) Diet/Feeding Tube   (Enteral feeds, nutritional drinks, increased Vitamin K in diet can decrease INR)   H/o CVA 2-3 Recent changes by PCP: 5mg daily from 2.5mg TRS, 5mg all others General       x Home Med? Meds Increasing INR x Home Med?  Meds Decreasing INR     Allopurinol    Azathioprine     Amiodarone/Propafenone/Dronedarone   Carbamazepine     Androgens   Cholestyramine     Chemotherapy (BBW: Capecitabine)   Estrogen     Ciprofloxacin/Levofloxacin   Nafcillin/Dicloxacillin     Clarithromycin/Erythromycin/Azithromycin   Barbiturates      Fluconazole/Itraconazole/Voriconazole/Ketoconazole   Phenytoin (Variable)     Metronidazole   Rifampin     Phenytoin (Variable)   Steroids (Variable/Dose Dependent)   X   Statins/Fenofibrate/Gemfibrozil   Sucralfate     Steroids (Variable/Dose Dependent)   Other:     Sulfamethoxazole/Trimethoprim        Tramadol         Other:        Comments regarding medication interactions:      x Diseases Affecting INR x Increased Bleeding Risk    CHF Exacerbation (Increases)  History GI Bleed/PUD    Liver Disease (Increases)  Chronic NSAID Use    Thyroid: Hyper (Increases)  Hypo (Decreases)  Chronic ASA/Antiplatelet Use (Clopidogrel/ Dipyridamole/Prasugrel/Ticagrelor)      Malignancy (Increases)  Abnormal Renal Function (dialysis, renal transplant, SCr ? 2.3 mg/dL)     History of EtOH Abuse: Acute (Increases)   Chronic (Decreases)  Liver Function (cirrhosis, bilirubin >2x ULN with AST/ALT/AP >3x ULN)   X Fever (Increases)  Age > 65 years   X Acute infection (Increases)  Hypertension/Uncontrolled BP   X Diarrhea/Dehydration (Increases)  History of stroke    Other: __________________  Other:___________________       Vitamin K or Blood product  Administration Date    Vit K 2.5mg PO x1 5/31              TSH:    Lab Results   Component Value Date    TSH 0.026 06/02/2021        Hepatic Function Panel:                            Lab Results   Component Value Date    ALKPHOS 64 06/01/2021    ALT 17 06/01/2021    AST 37 06/01/2021    PROT 7.1 06/01/2021    BILITOT 0.3 06/01/2021    BILIDIR <0.2 04/28/2017    IBILI 0.1 04/28/2017    LABALBU 2.9 06/01/2021    LABALBU 4.2 05/24/2012       Date Warfarin Dose INR Heparin or LMWH HGB/HCT PLT Comment   5/31 HOLD 7.5 -- 12.8/39.5 135 Vit K 2.5mg PO x1   6/1 HOLD 6.7 -- 12.3/39.1 130    6/2 3.5mg 2.5 -- 12.4/37.8 169    6/3 HOLD 3.2 -- 12.3/37.7 198    6/4 HOLD 4 -- 12.1/37.2 210    6/5 HOLD 4.1 X 12.4/38.7 243    6/6 HOLD 3.4 X 12.1/38.4 258    6/7 2.5mg 2.6 -- 12.8/40.6 238    6/8 5mg 2.0 -- 13.8/42.8 302    6/9 5mg ? -- -- --      Assessment and Plan:  · Patient is a 62 yo female presents with COVID   · Patient is on warfarin for hx of CVA   · Goal INR 2-3  · INR 2.0  · Warfarin 5mg tonight; INR may continue to decrease tomorrow after being on hold for multiple days  · Daily PT/INR until the INR is stable within the therapeutic range  · Pharmacist will follow and monitor/adjust dosing as necessary    Rodrigo Balbuena PharmD, BCPS 6/9/2021 10:22 AM   Ext: [phone]

## 2021-06-09 NOTE — PROGRESS NOTES
Spoke to [employer] re: discharge, patient set up with [facility]. Called PAS,  set up to  patient at 1430 for discharge home.

## 2021-06-10 ENCOUNTER — CARE COORDINATION (OUTPATIENT)
Dept: CASE MANAGEMENT | Age: 61
End: 2021-06-10

## 2021-06-11 ENCOUNTER — CARE COORDINATION (OUTPATIENT)
Dept: CASE MANAGEMENT | Age: 61
End: 2021-06-11

## 2021-06-11 NOTE — CARE COORDINATION
COVID-19 Monitoring Initial Follow-up Note    Call within 2 business days of discharge: Yes. Second and final attempt to reach the patient for COVID Monitoring (positive) Care Transition call post hospital discharge. Message left with [facility]'s contact information requesting return phone call. Will sign off.

## 2021-06-15 ENCOUNTER — VIRTUAL VISIT (OUTPATIENT)
Dept: PRIMARY CARE CLINIC | Age: 61
End: 2021-06-15
Payer: COMMERCIAL

## 2021-06-15 DIAGNOSIS — I69.359 HEMIPARESIS FOLLOWING CEREBROVASCULAR ACCIDENT (CVA) (HCC): Chronic | ICD-10-CM

## 2021-06-15 DIAGNOSIS — E78.5 HYPERLIPIDEMIA, UNSPECIFIED HYPERLIPIDEMIA TYPE: ICD-10-CM

## 2021-06-15 DIAGNOSIS — D68.9 MEDICATION INDUCED COAGULOPATHY (HCC): ICD-10-CM

## 2021-06-15 DIAGNOSIS — T50.905A MEDICATION INDUCED COAGULOPATHY (HCC): ICD-10-CM

## 2021-06-15 DIAGNOSIS — R20.0 NUMBNESS AND TINGLING OF RIGHT ARM AND LEG: ICD-10-CM

## 2021-06-15 DIAGNOSIS — R20.2 NUMBNESS AND TINGLING OF RIGHT ARM AND LEG: ICD-10-CM

## 2021-06-15 DIAGNOSIS — U07.1 PNEUMONIA DUE TO COVID-19 VIRUS: Primary | ICD-10-CM

## 2021-06-15 DIAGNOSIS — N30.00 ACUTE CYSTITIS WITHOUT HEMATURIA: ICD-10-CM

## 2021-06-15 DIAGNOSIS — F41.9 ANXIETY: ICD-10-CM

## 2021-06-15 DIAGNOSIS — J12.82 PNEUMONIA DUE TO COVID-19 VIRUS: Primary | ICD-10-CM

## 2021-06-15 DIAGNOSIS — E03.9 ACQUIRED HYPOTHYROIDISM: Chronic | ICD-10-CM

## 2021-06-15 PROCEDURE — 99496 TRANSJ CARE MGMT HIGH F2F 7D: CPT | Performed by: FAMILY MEDICINE

## 2021-06-15 PROCEDURE — 1111F DSCHRG MED/CURRENT MED MERGE: CPT | Performed by: FAMILY MEDICINE

## 2021-06-15 RX ORDER — BACLOFEN 10 MG/1
TABLET ORAL
Qty: 90 TABLET | Refills: 1 | Status: SHIPPED
Start: 2021-06-15 | End: 2021-11-21

## 2021-06-15 RX ORDER — GABAPENTIN 600 MG/1
600 TABLET ORAL 2 TIMES DAILY
Qty: 180 TABLET | Refills: 0 | Status: SHIPPED
Start: 2021-06-15 | End: 2022-01-19

## 2021-06-15 RX ORDER — LORATADINE 10 MG/1
10 CAPSULE, LIQUID FILLED ORAL DAILY
Qty: 90 CAPSULE | Refills: 1 | Status: SHIPPED
Start: 2021-06-15 | End: 2022-01-19 | Stop reason: SDUPTHER

## 2021-06-15 RX ORDER — LEVOTHYROXINE SODIUM 0.15 MG/1
150 TABLET ORAL DAILY
Qty: 90 TABLET | Refills: 1 | Status: SHIPPED
Start: 2021-06-15 | End: 2022-01-19

## 2021-06-15 RX ORDER — CLOPIDOGREL BISULFATE 75 MG/1
75 TABLET ORAL DAILY
Qty: 30 TABLET | Refills: 0 | Status: SHIPPED
Start: 2021-06-15 | End: 2021-08-12

## 2021-06-15 RX ORDER — ATORVASTATIN CALCIUM 20 MG/1
20 TABLET, FILM COATED ORAL DAILY
Qty: 90 TABLET | Refills: 1 | Status: SHIPPED
Start: 2021-06-15 | End: 2022-01-19

## 2021-06-15 RX ORDER — WARFARIN SODIUM 2.5 MG/1
TABLET ORAL
Qty: 90 TABLET | Refills: 1 | Status: SHIPPED
Start: 2021-06-15 | End: 2021-07-30

## 2021-06-15 RX ORDER — BUSPIRONE HYDROCHLORIDE 15 MG/1
15 TABLET ORAL 2 TIMES DAILY
Qty: 180 TABLET | Refills: 1 | Status: SHIPPED
Start: 2021-06-15 | End: 2022-01-19

## 2021-06-15 RX ORDER — CITALOPRAM 40 MG/1
40 TABLET ORAL DAILY
Qty: 90 TABLET | Refills: 1 | Status: SHIPPED
Start: 2021-06-15 | End: 2022-01-03

## 2021-06-15 NOTE — PROGRESS NOTES
Post-Discharge Transitional Care Management Services or Hospital Follow Up      Guerda Whatley   YOB: 1960    Date of Office Visit:  6/15/2021  Date of Hospital Admission: 5/31/21  Date of Hospital Discharge: 6/9/21  Risk of hospital readmission (high >=14%.  Medium >=10%) :Readmission Risk Score: 16      Care management risk score Rising risk (score 2-5) and Complex Care (Scores >=6): 5     Non face to face  following discharge, date last encounter closed (first attempt may have been earlier): 6/11/2021 10:49 AM    Call initiated 2 business days of discharge: Yes    Patient Active Problem List   Diagnosis    OA (osteoarthritis)    Hypothyroidism    JOSE on CPAP    HTN (hypertension)    [facility]) [facility]    PFO with atrial septal aneurysm    Adult BMI >=70 kg/sq m ([address]    Rt Hemiparesis following cerebrovascular accident (CVA) ([facility]    Neuropathy    Depression    Venous insufficiency of both lower extremities    FLAVIO (generalized anxiety disorder)    Varicose veins of left leg with edema    Cellulitis of right leg    Iron deficiency anemia    Postmenopausal vaginal bleeding    Complex endometrial hyperplasia with atypia    Lymphedema of both lower extremities    Pneumonia due to COVID-19 virus    Hemiparesis affecting right side as late effect of cerebrovascular accident (CVA) ([address]    CKD (chronic kidney disease) stage 3, GFR 30-59 ml/min (HCC)    Morbid obesity with BMI of 60.0-69.9, adult ([address]    Hypoprothrombinemia (HCC)       Allergies   Allergen Reactions    Pcn [Penicillins] Shortness Of Breath    Penicillin G Shortness Of Breath       Medications listed as ordered at the time of discharge from hospital   [place], [address] [facility] [address] Medication Instructions HAFSA:    Printed on:06/15/21 1111   Medication Information                      acetaminophen (TYLENOL ARTHRITIS PAIN) 650 MG extended release tablet  Take 1,300 mg by mouth every 8 hours as needed for Pain Domenico Disla, DO   Medication Sig Dispense Refill    levothyroxine (SYNTHROID) 150 MCG tablet Take 1 tablet by mouth daily 30 tablet 1    clopidogrel (PLAVIX) 75 MG tablet Take 1 tablet by mouth daily 30 days only 30 tablet 0    warfarin (COUMADIN) 2.5 MG tablet 2.5mg Daily, monitor INR adjust dose accordingly per physician 90 tablet 0    benzonatate (TESSALON PERLES) 100 MG capsule Take 1 capsule by mouth 3 times daily as needed for Cough 30 capsule 0    vitamin D (ERGOCALCIFEROL) 1.25 MG (03709 UT) CAPS capsule Take 1 capsule by mouth once a week (Patient taking differently: Take 50,000 Units by mouth once a week ON MONDAYS) 12 capsule 0    traMADol (ULTRAM) 50 MG tablet Take 50 mg by mouth every 6 hours as needed for Pain.  atorvastatin (LIPITOR) 20 MG tablet Take 1 tablet by mouth daily 90 tablet 0    loratadine (CLARITIN) 10 MG capsule Take 1 capsule by mouth daily 90 capsule 0    busPIRone (BUSPAR) 15 MG tablet Take 15 mg by mouth 2 times daily 90 tablet 0    citalopram (CELEXA) 40 MG tablet Take 1 tablet by mouth daily 90 tablet 0    gabapentin (NEURONTIN) 600 MG tablet Take 1 tablet by mouth 3 times daily for 90 days. (Patient taking differently: Take 600 mg by mouth 2 times daily. ) 270 tablet 0    baclofen (LIORESAL) 10 MG tablet TAKE 1/2 TABLET BY MOUTH 2 TIMES A DAY 90 tablet 0    mupirocin (BACTROBAN) 2 % ointment Apply topically 3 times daily. 22 g 2    mineral oil-hydrophilic petrolatum (HYDROPHOR) ointment Apply topically as needed. 228 g 0    Disposable Gloves (LATEX GLOVES MEDIUM) MISC Use as needed.  100 each 0    Elastic Bandages & Supports (MEDICAL COMPRESSION STOCKINGS) MISC 1 each by Does not apply route daily 1 each 0    Magnesium Cl-Calcium Carbonate (SLOW-MAG PO) Take by mouth nightly      Lift Chair MISC by Does not apply route 1 each 0    acetaminophen (TYLENOL ARTHRITIS PAIN) 650 MG extended release tablet Take 1,300 mg by mouth every 8 hours as needed for Pain      CPAP Machine MISC by Does not apply route      levonorgestrel (MIRENA, 52 MG,) IUD 52 mg 1 each by Intrauterine route once      Misc. Devices (GEL-FOAM CUSHION) MISC For chair 1 each 0        Medications patient taking as of now reconciled against medications ordered at time of hospital discharge: Yes    Chief Complaint   Patient presents with   [address] from Hospital       History of Present illness - Follow up of Hospital diagnosis(es): pneumonia due to Covid, coagulopathy, uti,  hypothyroidism,  obesity    Inpatient course: Discharge summary reviewed- see chart. Interval history/Current status: Gen: no chills or fevers. Tired but improving able to stay up longer during day. Was sleeping a lot in the hospital.  Lost 10 lbs in hosp because wasn't eating much. Having improved taste. Never lost smell. having PT 3 x's a wk. Nurse coming to house once a wk for 3 wks. Resp: breathing fine without sob, cough. CV: no cp or palp's. Coumadin dose changed 2.5 mg qd vs 5 mg with 2.5 mg qod. .  : no dysuria, freq      A comprehensive review of systems was negative except for what was noted in the HPI. There were no vitals filed for this visit. There is no height or weight on file to calculate BMI. Wt Readings from Last 3 Encounters:   05/31/21 (!) 350 lb (158.8 kg)   04/28/21 (!) 360 lb (163.3 kg)   04/06/21 (!) 360 lb (163.3 kg)     BP Readings from Last 3 Encounters:   06/09/21 (!) 142/66   04/06/21 128/80   01/13/20 134/84        Physical Exam:  General Appearance: alert and oriented to person, place and time, well-developed and well-nourished, in no acute distress  Head: normocephalic and atraumatic  Eyes: sclera anicteric  Pulmonary/Chest: doesn't appear sob,    Assessment/Plan:  1. Pneumonia due to COVID-19 virus  - monitor breathing, temps    2. Hyperlipidemia, unspecified hyperlipidemia type    - atorvastatin (LIPITOR) 20 MG tablet;  Take 1 tablet by mouth daily  Dispense: 90 tablet; Refill: 1    3. Rt Hemiparesis following cerebrovascular accident (CVA) ([facility]    - baclofen (LIORESAL) 10 MG tablet; TAKE 1/2 TABLET BY MOUTH 2 TIMES A DAY  Dispense: 90 tablet; Refill: 1  - warfarin (COUMADIN) 2.5 MG tablet; 2.5mg Daily, monitor INR adjust dose accordingly per physician  Dispense: 90 tablet; Refill: 1    4. Anxiety  - continue meds  - busPIRone (BUSPAR) 15 MG tablet; Take 15 mg by mouth 2 times daily  Dispense: 180 tablet; Refill: 1  - citalopram (CELEXA) 40 MG tablet; Take 1 tablet by mouth daily  Dispense: 90 tablet; Refill: 1    5. Numbness and tingling of right arm and leg  - resume med  - gabapentin (NEURONTIN) 600 MG tablet; Take 1 tablet by mouth 2 times daily for 90 days. Dispense: 180 tablet; Refill: 0    6. Acquired hypothyroidism  - stay on lower dose of synthroid. 7. Medication induced coagulopathy (HCC)  - inr 2.5 today on coumadin 2.5 mg    8. Acute cystitis without hematuria  - on ab in [facility].          Medical Decision Making: high complexity

## 2021-06-18 ENCOUNTER — TELEPHONE (OUTPATIENT)
Dept: PRIMARY CARE CLINIC | Age: 61
End: 2021-06-18

## 2021-06-18 NOTE — TELEPHONE ENCOUNTER
Tylenol for the headache. Benadryl for the swishing sensation in her ear which could be fluid due to her recent infection.

## 2021-06-18 NOTE — TELEPHONE ENCOUNTER
Patient was just seen in the office on tuesday for a virtual visit. She is now stating she is having a frontal headache and swishing in her ears. I offered her an appt and a virtual and she wanted me to send a message about it since she just seen you Tuesday. She wanted to know what she should do.  Please advise

## 2021-06-24 ENCOUNTER — TELEPHONE (OUTPATIENT)
Dept: PRIMARY CARE CLINIC | Age: 61
End: 2021-06-24

## 2021-06-24 DIAGNOSIS — E66.01 CLASS 3 SEVERE OBESITY DUE TO EXCESS CALORIES WITH SERIOUS COMORBIDITY AND BODY MASS INDEX (BMI) OF 60.0 TO 69.9 IN ADULT (HCC): Primary | ICD-10-CM

## 2021-06-28 NOTE — TELEPHONE ENCOUNTER
Pt called and stated the order for the bed was wrote wrong. . she said it was ordered as a hi/low and it needs to be for a semi -automatic bed.

## 2021-06-29 ENCOUNTER — TELEPHONE (OUTPATIENT)
Dept: PRIMARY CARE CLINIC | Age: 61
End: 2021-06-29

## 2021-06-29 RX ORDER — TALC
POWDER (GRAM) TOPICAL
Qty: 392 G | Refills: 1 | Status: SHIPPED | OUTPATIENT
Start: 2021-06-29

## 2021-06-29 RX ORDER — NYSTATIN 100000 [USP'U]/G
POWDER TOPICAL
Qty: 60 G | Refills: 1 | Status: SHIPPED
Start: 2021-06-29 | End: 2022-01-19 | Stop reason: SDUPTHER

## 2021-06-29 NOTE — TELEPHONE ENCOUNTER
Called in nystatin powder and cornstarch powder for rash that she is having below her breast folds in her lower stomach

## 2021-06-29 NOTE — TELEPHONE ENCOUNTER
Nurse called was out to check on patient. Patient is having a lot of yeast type rash issues under all the folds on her lower stomach area. Nurse would like to see if u can send over a rx for nystatin Powder and also a rx for cornstarch powder. Thanks any question can be directed to nurse at [phone]. send rx to pharmacy on file

## 2021-07-06 ENCOUNTER — ANTI-COAG VISIT (OUTPATIENT)
Dept: PRIMARY CARE CLINIC | Age: 61
End: 2021-07-06

## 2021-07-06 LAB — INR BLD: 1.2

## 2021-07-13 ENCOUNTER — ANTI-COAG VISIT (OUTPATIENT)
Dept: PRIMARY CARE CLINIC | Age: 61
End: 2021-07-13

## 2021-07-13 LAB — INR BLD: 1.3

## 2021-07-13 NOTE — PROGRESS NOTES
Increase Coumadin to 5 mg every other day with a 2.5 mg tablet every other day.   Recheck INR 1 week not examined

## 2021-07-14 ENCOUNTER — ANTI-COAG VISIT (OUTPATIENT)
Dept: PRIMARY CARE CLINIC | Age: 61
End: 2021-07-14

## 2021-07-14 LAB — INR BLD: 1.3

## 2021-07-20 ENCOUNTER — TELEPHONE (OUTPATIENT)
Dept: PRIMARY CARE CLINIC | Age: 61
End: 2021-07-20

## 2021-07-20 ENCOUNTER — ANTI-COAG VISIT (OUTPATIENT)
Dept: PRIMARY CARE CLINIC | Age: 61
End: 2021-07-20

## 2021-07-20 LAB — INR BLD: 1.7

## 2021-07-20 NOTE — TELEPHONE ENCOUNTER
Pt called stating her PT was 1.7 Dr. Phylis Klinefelter has her taking her medication 5mg every day except Saturday and on saturdays just to take 2.5. She wanted to know what she should do?

## 2021-07-20 NOTE — PROGRESS NOTES
Patient informed to increase her dose to 5mg daily and recheck in 1week. Patient verbalized understanding.

## 2021-07-27 ENCOUNTER — ANTI-COAG VISIT (OUTPATIENT)
Dept: PRIMARY CARE CLINIC | Age: 61
End: 2021-07-27

## 2021-07-27 LAB — INR BLD: 2.6

## 2021-08-10 ENCOUNTER — ANTI-COAG VISIT (OUTPATIENT)
Dept: PRIMARY CARE CLINIC | Age: 61
End: 2021-08-10

## 2021-08-10 LAB — INR BLD: 3.8

## 2021-08-31 ENCOUNTER — ANTI-COAG VISIT (OUTPATIENT)
Dept: PRIMARY CARE CLINIC | Age: 61
End: 2021-08-31

## 2021-08-31 LAB — INR BLD: 3.9

## 2021-09-07 ENCOUNTER — VIRTUAL VISIT (OUTPATIENT)
Dept: PRIMARY CARE CLINIC | Age: 61
End: 2021-09-07
Payer: COMMERCIAL

## 2021-09-07 ENCOUNTER — ANTI-COAG VISIT (OUTPATIENT)
Dept: PRIMARY CARE CLINIC | Age: 61
End: 2021-09-07

## 2021-09-07 DIAGNOSIS — I69.359 HEMIPARESIS FOLLOWING CEREBROVASCULAR ACCIDENT (CVA) (HCC): ICD-10-CM

## 2021-09-07 DIAGNOSIS — R23.4 PEELING SKIN: Primary | ICD-10-CM

## 2021-09-07 DIAGNOSIS — M25.551 HIP PAIN, ACUTE, RIGHT: ICD-10-CM

## 2021-09-07 LAB — INR BLD: 3.3

## 2021-09-07 PROCEDURE — G8427 DOCREV CUR MEDS BY ELIG CLIN: HCPCS | Performed by: FAMILY MEDICINE

## 2021-09-07 PROCEDURE — 3017F COLORECTAL CA SCREEN DOC REV: CPT | Performed by: FAMILY MEDICINE

## 2021-09-07 PROCEDURE — G8417 CALC BMI ABV UP PARAM F/U: HCPCS | Performed by: FAMILY MEDICINE

## 2021-09-07 PROCEDURE — 99213 OFFICE O/P EST LOW 20 MIN: CPT | Performed by: FAMILY MEDICINE

## 2021-09-07 PROCEDURE — 1036F TOBACCO NON-USER: CPT | Performed by: FAMILY MEDICINE

## 2021-09-07 SDOH — ECONOMIC STABILITY: FOOD INSECURITY: WITHIN THE PAST 12 MONTHS, THE FOOD YOU BOUGHT JUST DIDN'T LAST AND YOU DIDN'T HAVE MONEY TO GET MORE.: NEVER TRUE

## 2021-09-07 SDOH — ECONOMIC STABILITY: FOOD INSECURITY: WITHIN THE PAST 12 MONTHS, YOU WORRIED THAT YOUR FOOD WOULD RUN OUT BEFORE YOU GOT MONEY TO BUY MORE.: NEVER TRUE

## 2021-09-07 ASSESSMENT — ENCOUNTER SYMPTOMS: SHORTNESS OF BREATH: 0

## 2021-09-07 ASSESSMENT — SOCIAL DETERMINANTS OF HEALTH (SDOH): HOW HARD IS IT FOR YOU TO PAY FOR THE VERY BASICS LIKE FOOD, HOUSING, MEDICAL CARE, AND HEATING?: NOT HARD AT ALL

## 2021-09-07 NOTE — PROGRESS NOTES
Dayanna Fisher, a female of 63 y.o.did a virtual visit on 9/7/2021. Patient Active Problem List   Diagnosis    OA (osteoarthritis)    Hypothyroidism    JOSE on CPAP    HTN (hypertension)    [facility]    PFO with atrial septal aneurysm    Adult BMI >=70 kg/sq m ([facility]    Rt Hemiparesis following cerebrovascular accident (CVA) ([facility]    Neuropathy    Depression    Venous insufficiency of both lower extremities    FLAVIO (generalized anxiety disorder)    Varicose veins of left leg with edema    Cellulitis of right leg    Iron deficiency anemia    Postmenopausal vaginal bleeding    Complex endometrial hyperplasia with atypia    Lymphedema of both lower extremities    Pneumonia due to COVID-19 virus    Hemiparesis affecting right side as late effect of cerebrovascular accident (CVA) ([facility]    CKD (chronic kidney disease) stage 3, GFR 30-59 ml/min ([facility]    Morbid obesity with BMI of 60.0-69.9, adult ([facility]    Hypoprothrombinemia (HCC)          HPI skin: RLE is peeling. Using Aquaphor which helps. No redness to skin or warmness. No pain in the leg. Also leg is seeping as well. R inguinal pain: inside her body not outside. Thigh begins to hurt as day goes on and then has trouble lifting her leg. Denies genitalia pain. Pain feels like its in the bone. Tylenol not helping. In wheelchair all day. Pain for 3 wks that is getting worse. CVA: on 5 mg daily of Coumadin. Allergies   Allergen Reactions    Pcn [Penicillins] Shortness Of Breath    Penicillin G Shortness Of Breath       Current Outpatient Medications on File Prior to Visit   Medication Sig Dispense Refill    vitamin D (ERGOCALCIFEROL) 1.25 MG (42540 UT) CAPS capsule Take 1 capsule by mouth once a week 12 capsule 0    warfarin (COUMADIN) 5 MG tablet Take 1 tablet by mouth daily TAKE ONE TABLET BY MOUTH DAILY ON SUNDAYS, MONDAYS, WEDNESDAYS AND FRIDAYS.  90 tablet 1    nystatin (MYCOSTATIN) 272762 UNIT/GM powder Apply 3 times daily. 60 g 1    Corn Starch ([employer] CORNSTARCH) POWD Apply in a.m. 392 g [place] by Does not apply route Semiautomatic 1 each 0    atorvastatin (LIPITOR) 20 MG tablet Take 1 tablet by mouth daily 90 tablet 1    baclofen (LIORESAL) 10 MG tablet TAKE 1/2 TABLET BY MOUTH 2 TIMES A DAY 90 tablet 1    busPIRone (BUSPAR) 15 MG tablet Take 15 mg by mouth 2 times daily 180 tablet 1    citalopram (CELEXA) 40 MG tablet Take 1 tablet by mouth daily 90 tablet 1    gabapentin (NEURONTIN) 600 MG tablet Take 1 tablet by mouth 2 times daily for 90 days. 180 tablet 0    levothyroxine (SYNTHROID) 150 MCG tablet Take 1 tablet by mouth daily 90 tablet 1    loratadine (CLARITIN) 10 MG capsule Take 1 capsule by mouth daily 90 capsule 1    benzonatate (TESSALON PERLES) 100 MG capsule Take 1 capsule by mouth 3 times daily as needed for Cough 30 capsule 0    traMADol (ULTRAM) 50 MG tablet Take 50 mg by mouth every 6 hours as needed for Pain.  mupirocin (BACTROBAN) 2 % ointment Apply topically 3 times daily. 22 g 2    mineral oil-hydrophilic petrolatum (HYDROPHOR) ointment Apply topically as needed. 228 g 0    Disposable Gloves (LATEX GLOVES MEDIUM) MISC Use as needed. 100 each 0    Elastic Bandages & Supports (MEDICAL COMPRESSION STOCKINGS) MISC 1 each by Does not apply route daily 1 each 0    Magnesium Cl-Calcium Carbonate (SLOW-MAG PO) Take by mouth nightly      Lift Chair MISC by Does not apply route 1 each 0    acetaminophen (TYLENOL ARTHRITIS PAIN) 650 MG extended release tablet Take 1,300 mg by mouth every 8 hours as needed for Pain      CPAP Machine MISC by Does not apply route      levonorgestrel (MIRENA, 52 MG,) IUD 52 mg 1 each by Intrauterine route once      Misc. Devices (GEL-FOAM CUSHION) MISC For chair 1 each 0     No current facility-administered medications on file prior to visit. Review of Systems   Respiratory: Negative for shortness of breath. Cardiovascular: Negative for chest pain. Musculoskeletal: Positive for gait problem. Psychiatric/Behavioral:        Has a new dog.     other review of systems reviewed and are negative    OBJECTIVE:  There were no vitals taken for this visit. Physical Exam  Constitutional:       General: She is not in acute distress. Appearance: Normal appearance. She is not ill-appearing, toxic-appearing or diaphoretic. HENT:      Head: Normocephalic. Eyes:      General: No scleral icterus. Pulmonary:      Effort: Pulmonary effort is normal.   Skin:     Comments: RLE: noted superficial peeling of skin. No redness or swelling noted. Neurological:      Mental Status: She is alert and oriented to person, place, and time. Psychiatric:         Mood and Affect: Mood normal.         ASSESSMENT AND PLAN:    Tylor Hugo was seen today for 3 month follow-up, leg problem and groin pain. Diagnoses and all orders for this visit:    Peeling skin    Rt Hemiparesis following cerebrovascular accident (CVA) ([address]    Hip pain, acute, right  -     XR HIP RIGHT (2-3 VIEWS); Future    - no change in coumadin dose  - monitor skin  - recommend covid vaccine     Return in 3 months (on 12/7/2021) for based on results of xray and if skin worsens. , or for acute problem. Endy Jim,     TeleMedicine Patient Consent    This visit was performed as a virtual video visit using a synchronous, two-way, audio-video telehealth technology platform. Patient identification was verified at the start of the visit, including the patient's telephone number and physical location. I discussed with the patient the nature of our telehealth visits, that:     1. Due to the nature of an audio- video modality, the only components of a physical exam that could be done are the elements supported by direct observation. 2. I would evaluate the patient and recommend diagnostics and treatments based on my assessment.   3. If it was felt that the patient should be evaluated in clinic or an emergency room setting, then they would be directed there. 4. Our sessions are not being recorded and that personal health information is protected. 5. Our team would provide follow up care in person if/when the patient needs it. Patient does agree to proceed with telemedicine consultation. Patient's location: home address in [address] [place] [address]  Physician  location [facility]. other people involved in call none. Time spent: Not billed by time    This visit was completed virtually using D[employer]. me

## 2021-09-09 DIAGNOSIS — M25.551 HIP PAIN, ACUTE, RIGHT: ICD-10-CM

## 2021-09-13 ENCOUNTER — TELEPHONE (OUTPATIENT)
Dept: PRIMARY CARE CLINIC | Age: 61
End: 2021-09-13

## 2021-09-13 DIAGNOSIS — R23.4 PEELING SKIN: Primary | ICD-10-CM

## 2021-09-13 NOTE — TELEPHONE ENCOUNTER
Spoke with patient right hip x-ray shows no abnormalities. She states she is feeling a little better as well. Encouraged her to try to do some movement with this hip. We will continue to monitor if it gets worse.

## 2021-09-13 NOTE — TELEPHONE ENCOUNTER
Patient called out of nurse visits needs a new order to have [facility] come out and evaluate wound right leg leaking.

## 2021-09-16 ENCOUNTER — TELEPHONE (OUTPATIENT)
Dept: PRIMARY CARE CLINIC | Age: 61
End: 2021-09-16

## 2021-09-16 NOTE — TELEPHONE ENCOUNTER
Patient called and said she had appointment today and she did not cancel but they did not set up her ride she is quite upset just wanted you to know. Can you addend your referral and note to state that her leg is also seeping.  They rescheduled her for 9/28

## 2021-09-20 ENCOUNTER — TELEPHONE (OUTPATIENT)
Dept: PRIMARY CARE CLINIC | Age: 61
End: 2021-09-20

## 2021-09-20 DIAGNOSIS — E66.01 MORBID OBESITY (HCC): ICD-10-CM

## 2021-09-20 DIAGNOSIS — R22.41 MASS OF RIGHT LOWER EXTREMITY: Primary | ICD-10-CM

## 2021-09-20 NOTE — TELEPHONE ENCOUNTER
Pt called stating she fell this weekend when she got up to go to the bathroom. She things the lump on the back of her thigh is related to this. She states she cant get her balance and wanted you to order an x-ray of the lump and the leg. She stated it would need to be the mobile x-ray.

## 2021-09-28 NOTE — TELEPHONE ENCOUNTER
Referral made to [facility] plastic surgery due to this leg mass on her right leg that she has. She has been seen by a local plastic surgeon in the past who stated he could not do surgery for her.

## 2021-11-19 DIAGNOSIS — I69.359 HEMIPARESIS FOLLOWING CEREBROVASCULAR ACCIDENT (CVA) (HCC): Chronic | ICD-10-CM

## 2021-11-21 RX ORDER — BACLOFEN 10 MG/1
TABLET ORAL
Qty: 90 TABLET | Refills: 0 | Status: SHIPPED
Start: 2021-11-21 | End: 2022-01-19 | Stop reason: SDUPTHER

## 2021-11-29 RX ORDER — ERGOCALCIFEROL 1.25 MG/1
50000 CAPSULE ORAL WEEKLY
Qty: 12 CAPSULE | Refills: 0 | Status: SHIPPED
Start: 2021-11-29 | End: 2022-01-19 | Stop reason: SDUPTHER

## 2021-12-07 ENCOUNTER — TELEMEDICINE (OUTPATIENT)
Dept: PRIMARY CARE CLINIC | Age: 61
End: 2021-12-07
Payer: COMMERCIAL

## 2021-12-07 DIAGNOSIS — I69.359 HEMIPARESIS FOLLOWING CEREBROVASCULAR ACCIDENT (CVA) (HCC): ICD-10-CM

## 2021-12-07 DIAGNOSIS — I10 ESSENTIAL HYPERTENSION: Primary | ICD-10-CM

## 2021-12-07 DIAGNOSIS — M77.8 TENDONITIS OF WRIST, LEFT: ICD-10-CM

## 2021-12-07 DIAGNOSIS — L30.9 DERMATITIS: ICD-10-CM

## 2021-12-07 DIAGNOSIS — E66.01 MORBID OBESITY (HCC): ICD-10-CM

## 2021-12-07 PROCEDURE — G8484 FLU IMMUNIZE NO ADMIN: HCPCS | Performed by: FAMILY MEDICINE

## 2021-12-07 PROCEDURE — 99214 OFFICE O/P EST MOD 30 MIN: CPT | Performed by: FAMILY MEDICINE

## 2021-12-07 PROCEDURE — G8417 CALC BMI ABV UP PARAM F/U: HCPCS | Performed by: FAMILY MEDICINE

## 2021-12-07 PROCEDURE — 3017F COLORECTAL CA SCREEN DOC REV: CPT | Performed by: FAMILY MEDICINE

## 2021-12-07 PROCEDURE — 1036F TOBACCO NON-USER: CPT | Performed by: FAMILY MEDICINE

## 2021-12-07 PROCEDURE — G8427 DOCREV CUR MEDS BY ELIG CLIN: HCPCS | Performed by: FAMILY MEDICINE

## 2021-12-07 RX ORDER — TRIAMCINOLONE ACETONIDE 1 MG/G
CREAM TOPICAL
Qty: 80 G | Refills: 1 | Status: SHIPPED
Start: 2021-12-07 | End: 2022-01-14

## 2021-12-07 ASSESSMENT — ENCOUNTER SYMPTOMS: SHORTNESS OF BREATH: 0

## 2021-12-07 NOTE — PROGRESS NOTES
to Visit   Medication Sig Dispense Refill    vitamin D (ERGOCALCIFEROL) 1.25 MG (47658 UT) CAPS capsule Take 1 capsule by mouth once a week 12 capsule 0    baclofen (LIORESAL) 10 MG tablet TAKE 1/2 TABLET BY MOUTH 2 TIMES A DAY 90 tablet 0    warfarin (COUMADIN) 5 MG tablet Take 1 tablet by mouth daily TAKE ONE TABLET BY MOUTH DAILY ON SUNDAYS, MONDAYS, WEDNESDAYS AND FRIDAYS. 90 tablet 1    nystatin (MYCOSTATIN) 501802 UNIT/GM powder Apply 3 times daily. 60 g 1    Corn Starch (JOHNSONS BABY CORNSTARCH) POWD Apply in a.m. 392 g [place] by Does not apply route Semiautomatic 1 each 0    atorvastatin (LIPITOR) 20 MG tablet Take 1 tablet by mouth daily 90 tablet 1    busPIRone (BUSPAR) 15 MG tablet Take 15 mg by mouth 2 times daily 180 tablet 1    citalopram (CELEXA) 40 MG tablet Take 1 tablet by mouth daily 90 tablet 1    gabapentin (NEURONTIN) 600 MG tablet Take 1 tablet by mouth 2 times daily for 90 days. 180 tablet 0    levothyroxine (SYNTHROID) 150 MCG tablet Take 1 tablet by mouth daily 90 tablet 1    loratadine (CLARITIN) 10 MG capsule Take 1 capsule by mouth daily 90 capsule 1    benzonatate (TESSALON PERLES) 100 MG capsule Take 1 capsule by mouth 3 times daily as needed for Cough 30 capsule 0    traMADol (ULTRAM) 50 MG tablet Take 50 mg by mouth every 6 hours as needed for Pain.  mupirocin (BACTROBAN) 2 % ointment Apply topically 3 times daily. 22 g 2    mineral oil-hydrophilic petrolatum (HYDROPHOR) ointment Apply topically as needed. 228 g 0    Disposable Gloves (LATEX GLOVES MEDIUM) MISC Use as needed.  100 each 0    Elastic Bandages & Supports (MEDICAL COMPRESSION STOCKINGS) MISC 1 each by Does not apply route daily 1 each 0    Magnesium Cl-Calcium Carbonate (SLOW-MAG PO) Take by mouth nightly      Lift Chair MISC by Does not apply route 1 each 0    acetaminophen (TYLENOL ARTHRITIS PAIN) 650 MG extended release tablet Take 1,300 mg by mouth every 8 hours as needed for Pain      CPAP Machine MISC by Does not apply route      levonorgestrel (MIRENA, 52 MG,) IUD 52 mg 1 each by Intrauterine route once      Misc. Devices (GEL-FOAM CUSHION) MISC For chair 1 each 0     No current facility-administered medications on file prior to visit. Review of Systems   Respiratory: Negative for shortness of breath. Cardiovascular: Positive for leg swelling. Negative for chest pain and palpitations. Musculoskeletal: Positive for arthralgias. Neurological: Negative for headaches. Psychiatric/Behavioral: The patient is nervous/anxious. other review of systems reviewed and are negative    OBJECTIVE:  There were no vitals taken for this visit. Physical Exam  Constitutional:       General: She is not in acute distress. Appearance: Normal appearance. She is not ill-appearing, toxic-appearing or diaphoretic. HENT:      Head: Normocephalic. Eyes:      General: No scleral icterus. Pulmonary:      Effort: Pulmonary effort is normal.   Skin:     Comments: Pictures showing dryness to skin of le's with abrasions and peeling. Neurological:      Mental Status: She is alert and oriented to person, place, and time. Psychiatric:         Mood and Affect: Mood normal.         ASSESSMENT AND PLAN:    Aby Vásquez was seen today for 3 month follow-up. Diagnoses and all orders for this visit:    Essential hypertension    Dermatitis  -     triamcinolone (KENALOG) 0.1 % cream; Apply topically 2 times daily. Tendonitis of wrist, left  -     diclofenac sodium (VOLTAREN) 1 % GEL; Apply 4 g topically 4 times daily    Rt Hemiparesis following cerebrovascular accident (CVA) ([address]    Morbid obesity ([address]  -     DME Order for Bedside Commode as OP    - bp stable continue current med  - recommend covid vax.  - encourage wt loss   - recommend exercise  - continue current coumadin dose. - continue Aquaphor to skin. - Rx for heavy duty mattress and bedside commode.      Return in about 3 months (around 3/7/2022). Dora Long, DO    TeleMedicine Patient Consent    This visit was performed as a virtual video visit using a synchronous, two-way, audio-video telehealth technology platform. Patient identification was verified at the start of the visit, including the patient's telephone number and physical location. I discussed with the patient the nature of our telehealth visits, that:     1. Due to the nature of an audio- video modality, the only components of a physical exam that could be done are the elements supported by direct observation. 2. I would evaluate the patient and recommend diagnostics and treatments based on my assessment. 3. If it was felt that the patient should be evaluated in clinic or an emergency room setting, then they would be directed there. 4. Our sessions are not being recorded and that personal health information is protected. 5. Our team would provide follow up care in person if/when the patient needs it. Patient does agree to proceed with telemedicine consultation. Patient's location: home address in [facility]. Physician  location [facility]. other people involved in call none. Time spent: Not billed by time    This visit was completed virtually using Doxy. me

## 2021-12-16 ENCOUNTER — TELEPHONE (OUTPATIENT)
Dept: PRIMARY CARE CLINIC | Age: 61
End: 2021-12-16

## 2021-12-16 NOTE — TELEPHONE ENCOUNTER
----- Message from Phill Joshi sent at 12/15/2021  3:43 PM EST -----  Subject: Message to Provider    QUESTIONS  Information for Provider? Carroll Mcdaniel wants to let the doctor know the   diclofenac is not helping her at all. She wants to know if they could go   there to give her an xray, or give her something else to try.  ---------------------------------------------------------------------------  --------------  CALL BACK INFO  What is the best way for the office to contact you? OK to leave message on   voicemail  Preferred Call Back Phone Number? [phone]  ---------------------------------------------------------------------------  --------------  SCRIPT ANSWERS  Relationship to Patient?  Self

## 2021-12-21 ENCOUNTER — TELEPHONE (OUTPATIENT)
Dept: PRIMARY CARE CLINIC | Age: 61
End: 2021-12-21

## 2021-12-27 DIAGNOSIS — M25.532 LEFT WRIST PAIN: Primary | ICD-10-CM

## 2021-12-28 ENCOUNTER — ANTI-COAG VISIT (OUTPATIENT)
Dept: PRIMARY CARE CLINIC | Age: 61
End: 2021-12-28

## 2021-12-28 DIAGNOSIS — M25.532 LEFT WRIST PAIN: ICD-10-CM

## 2021-12-28 LAB — INR BLD: 1.8

## 2022-01-03 ENCOUNTER — TELEPHONE (OUTPATIENT)
Dept: PRIMARY CARE CLINIC | Age: 62
End: 2022-01-03

## 2022-01-03 DIAGNOSIS — F41.9 ANXIETY: ICD-10-CM

## 2022-01-03 RX ORDER — CITALOPRAM 40 MG/1
40 TABLET ORAL DAILY
Qty: 90 TABLET | Refills: 0 | Status: SHIPPED
Start: 2022-01-03 | End: 2022-01-19 | Stop reason: SDUPTHER

## 2022-01-03 NOTE — TELEPHONE ENCOUNTER
Patient having terrible left wrist pain she has a virtual with you on Wednesday doesn't know what else she can take to help with her pain?

## 2022-01-05 ENCOUNTER — VIRTUAL VISIT (OUTPATIENT)
Dept: PRIMARY CARE CLINIC | Age: 62
End: 2022-01-05
Payer: COMMERCIAL

## 2022-01-05 DIAGNOSIS — M77.8 LEFT WRIST TENDONITIS: Primary | ICD-10-CM

## 2022-01-05 PROCEDURE — G8484 FLU IMMUNIZE NO ADMIN: HCPCS | Performed by: FAMILY MEDICINE

## 2022-01-05 PROCEDURE — 3017F COLORECTAL CA SCREEN DOC REV: CPT | Performed by: FAMILY MEDICINE

## 2022-01-05 PROCEDURE — 1036F TOBACCO NON-USER: CPT | Performed by: FAMILY MEDICINE

## 2022-01-05 PROCEDURE — G8417 CALC BMI ABV UP PARAM F/U: HCPCS | Performed by: FAMILY MEDICINE

## 2022-01-05 PROCEDURE — G8427 DOCREV CUR MEDS BY ELIG CLIN: HCPCS | Performed by: FAMILY MEDICINE

## 2022-01-05 PROCEDURE — 99213 OFFICE O/P EST LOW 20 MIN: CPT | Performed by: FAMILY MEDICINE

## 2022-01-05 RX ORDER — METHYLPREDNISOLONE 4 MG/1
TABLET ORAL
Qty: 1 KIT | Refills: 0 | Status: SHIPPED
Start: 2022-01-05 | End: 2022-03-14 | Stop reason: ALTCHOICE

## 2022-01-05 ASSESSMENT — PATIENT HEALTH QUESTIONNAIRE - PHQ9
9. THOUGHTS THAT YOU WOULD BE BETTER OFF DEAD, OR OF HURTING YOURSELF: 0
6. FEELING BAD ABOUT YOURSELF - OR THAT YOU ARE A FAILURE OR HAVE LET YOURSELF OR YOUR FAMILY DOWN: 0
5. POOR APPETITE OR OVEREATING: 0
8. MOVING OR SPEAKING SO SLOWLY THAT OTHER PEOPLE COULD HAVE NOTICED. OR THE OPPOSITE, BEING SO FIGETY OR RESTLESS THAT YOU HAVE BEEN MOVING AROUND A LOT MORE THAN USUAL: 0
SUM OF ALL RESPONSES TO PHQ QUESTIONS 1-9: 0
2. FEELING DOWN, DEPRESSED OR HOPELESS: 0
SUM OF ALL RESPONSES TO PHQ9 QUESTIONS 1 & 2: 0
SUM OF ALL RESPONSES TO PHQ QUESTIONS 1-9: 0
3. TROUBLE FALLING OR STAYING ASLEEP: 0
7. TROUBLE CONCENTRATING ON THINGS, SUCH AS READING THE NEWSPAPER OR WATCHING TELEVISION: 0
4. FEELING TIRED OR HAVING LITTLE ENERGY: 0
SUM OF ALL RESPONSES TO PHQ QUESTIONS 1-9: 0
SUM OF ALL RESPONSES TO PHQ QUESTIONS 1-9: 0
1. LITTLE INTEREST OR PLEASURE IN DOING THINGS: 0

## 2022-01-19 DIAGNOSIS — I69.359 HEMIPARESIS FOLLOWING CEREBROVASCULAR ACCIDENT (CVA) (HCC): Chronic | ICD-10-CM

## 2022-01-19 DIAGNOSIS — E78.5 HYPERLIPIDEMIA, UNSPECIFIED HYPERLIPIDEMIA TYPE: ICD-10-CM

## 2022-01-19 DIAGNOSIS — L30.9 DERMATITIS: ICD-10-CM

## 2022-01-19 DIAGNOSIS — R20.0 NUMBNESS AND TINGLING OF RIGHT ARM AND LEG: ICD-10-CM

## 2022-01-19 DIAGNOSIS — F41.9 ANXIETY: ICD-10-CM

## 2022-01-19 DIAGNOSIS — R20.2 NUMBNESS AND TINGLING OF RIGHT ARM AND LEG: ICD-10-CM

## 2022-01-19 RX ORDER — NYSTATIN 100000 [USP'U]/G
POWDER TOPICAL
Qty: 60 G | Refills: 1 | Status: SHIPPED | OUTPATIENT
Start: 2022-01-19

## 2022-01-19 RX ORDER — ATORVASTATIN CALCIUM 20 MG/1
20 TABLET, FILM COATED ORAL DAILY
Qty: 90 TABLET | Refills: 1 | Status: SHIPPED
Start: 2022-01-19 | End: 2022-03-14

## 2022-01-19 RX ORDER — LEVOTHYROXINE SODIUM 0.15 MG/1
150 TABLET ORAL DAILY
Qty: 90 TABLET | Refills: 1 | Status: SHIPPED
Start: 2022-01-19 | End: 2022-03-14 | Stop reason: ALTCHOICE

## 2022-01-19 RX ORDER — TRIAMCINOLONE ACETONIDE 1 MG/G
CREAM TOPICAL
Qty: 80 G | Refills: 0 | Status: SHIPPED
Start: 2022-01-19 | End: 2022-02-22 | Stop reason: SDUPTHER

## 2022-01-19 RX ORDER — ATORVASTATIN CALCIUM 20 MG/1
20 TABLET, FILM COATED ORAL DAILY
Qty: 90 TABLET | Refills: 0 | Status: SHIPPED
Start: 2022-01-19 | End: 2022-04-18

## 2022-01-19 RX ORDER — LORATADINE 10 MG/1
10 CAPSULE, LIQUID FILLED ORAL DAILY
Qty: 90 CAPSULE | Refills: 1 | Status: SHIPPED
Start: 2022-01-19 | End: 2022-04-18

## 2022-01-19 RX ORDER — BENZONATATE 100 MG/1
100 CAPSULE ORAL 3 TIMES DAILY PRN
Qty: 30 CAPSULE | Refills: 0 | Status: SHIPPED
Start: 2022-01-19 | End: 2022-05-13 | Stop reason: ALTCHOICE

## 2022-01-19 RX ORDER — GABAPENTIN 600 MG/1
600 TABLET ORAL 2 TIMES DAILY
Qty: 180 TABLET | Refills: 0 | Status: SHIPPED
Start: 2022-01-19 | End: 2022-03-14 | Stop reason: ALTCHOICE

## 2022-01-19 RX ORDER — WARFARIN SODIUM 5 MG/1
TABLET ORAL
Qty: 90 TABLET | Refills: 0 | Status: SHIPPED
Start: 2022-01-19 | End: 2022-10-07

## 2022-01-19 RX ORDER — LEVOTHYROXINE SODIUM 0.15 MG/1
150 TABLET ORAL DAILY
Qty: 90 TABLET | Refills: 0 | Status: SHIPPED
Start: 2022-01-19 | End: 2022-04-18

## 2022-01-19 RX ORDER — BACLOFEN 10 MG/1
10 TABLET ORAL 3 TIMES DAILY PRN
Qty: 90 TABLET | Refills: 0 | Status: SHIPPED
Start: 2022-01-19 | End: 2022-05-16

## 2022-01-19 RX ORDER — BUSPIRONE HYDROCHLORIDE 15 MG/1
15 TABLET ORAL 2 TIMES DAILY
Qty: 180 TABLET | Refills: 1 | Status: SHIPPED
Start: 2022-01-19 | End: 2022-03-14 | Stop reason: ALTCHOICE

## 2022-01-19 RX ORDER — GABAPENTIN 600 MG/1
600 TABLET ORAL 2 TIMES DAILY
Qty: 180 TABLET | Refills: 0 | Status: SHIPPED
Start: 2022-01-19 | End: 2022-04-18

## 2022-01-19 RX ORDER — BUSPIRONE HYDROCHLORIDE 15 MG/1
TABLET ORAL
Qty: 180 TABLET | Refills: 0 | Status: SHIPPED
Start: 2022-01-19 | End: 2022-04-18

## 2022-01-19 RX ORDER — ERGOCALCIFEROL 1.25 MG/1
50000 CAPSULE ORAL WEEKLY
Qty: 12 CAPSULE | Refills: 0 | Status: SHIPPED
Start: 2022-01-19 | End: 2022-05-13 | Stop reason: SDUPTHER

## 2022-01-19 RX ORDER — WARFARIN SODIUM 5 MG/1
5 TABLET ORAL DAILY
Qty: 90 TABLET | Refills: 1 | Status: SHIPPED
Start: 2022-01-19 | End: 2022-03-14 | Stop reason: ALTCHOICE

## 2022-01-19 RX ORDER — CITALOPRAM 40 MG/1
40 TABLET ORAL DAILY
Qty: 90 TABLET | Refills: 0 | Status: SHIPPED
Start: 2022-01-19 | End: 2022-06-28

## 2022-02-22 ENCOUNTER — TELEPHONE (OUTPATIENT)
Dept: PRIMARY CARE CLINIC | Age: 62
End: 2022-02-22

## 2022-02-22 ENCOUNTER — ANTI-COAG VISIT (OUTPATIENT)
Dept: PRIMARY CARE CLINIC | Age: 62
End: 2022-02-22

## 2022-02-22 DIAGNOSIS — L30.9 DERMATITIS: ICD-10-CM

## 2022-02-22 LAB — INR BLD: 1.9

## 2022-02-22 RX ORDER — TRIAMCINOLONE ACETONIDE 1 MG/G
CREAM TOPICAL
Qty: 80 G | Refills: 0 | Status: SHIPPED
Start: 2022-02-22 | End: 2022-03-14 | Stop reason: SDUPTHER

## 2022-02-22 NOTE — TELEPHONE ENCOUNTER
Spoke with pt, her pt is 1.9. she also lost her mother his past away on 02/14/2022, and woke up with hives this morning. She says it might be her nerves, wants to know what you think.

## 2022-02-22 NOTE — PROGRESS NOTES
Continue current Coumadin dose. Check INR is as scheduled. Please give her my sympathy on her mother's passing. The hives definitely could be secondary to stress. She can try some over-the-counter Allegra 180 mg for the hives as needed.   Call if no change

## 2022-03-14 ENCOUNTER — TELEMEDICINE (OUTPATIENT)
Dept: PRIMARY CARE CLINIC | Age: 62
End: 2022-03-14
Payer: COMMERCIAL

## 2022-03-14 DIAGNOSIS — E74.39 GLUCOSE INTOLERANCE: ICD-10-CM

## 2022-03-14 DIAGNOSIS — M77.8 LEFT WRIST TENDONITIS: ICD-10-CM

## 2022-03-14 DIAGNOSIS — L30.9 DERMATITIS: ICD-10-CM

## 2022-03-14 DIAGNOSIS — E66.01 MORBID OBESITY WITH BMI OF 60.0-69.9, ADULT (HCC): ICD-10-CM

## 2022-03-14 DIAGNOSIS — E03.9 ACQUIRED HYPOTHYROIDISM: Chronic | ICD-10-CM

## 2022-03-14 DIAGNOSIS — I10 ESSENTIAL HYPERTENSION: Primary | ICD-10-CM

## 2022-03-14 PROCEDURE — 1036F TOBACCO NON-USER: CPT | Performed by: FAMILY MEDICINE

## 2022-03-14 PROCEDURE — G8484 FLU IMMUNIZE NO ADMIN: HCPCS | Performed by: FAMILY MEDICINE

## 2022-03-14 PROCEDURE — G8427 DOCREV CUR MEDS BY ELIG CLIN: HCPCS | Performed by: FAMILY MEDICINE

## 2022-03-14 PROCEDURE — G8417 CALC BMI ABV UP PARAM F/U: HCPCS | Performed by: FAMILY MEDICINE

## 2022-03-14 PROCEDURE — 3017F COLORECTAL CA SCREEN DOC REV: CPT | Performed by: FAMILY MEDICINE

## 2022-03-14 PROCEDURE — 99214 OFFICE O/P EST MOD 30 MIN: CPT | Performed by: FAMILY MEDICINE

## 2022-03-14 RX ORDER — TRAMADOL HYDROCHLORIDE 50 MG/1
50 TABLET ORAL DAILY
Qty: 30 TABLET | Refills: 0 | Status: SHIPPED | OUTPATIENT
Start: 2022-03-14 | End: 2022-05-13

## 2022-03-14 RX ORDER — TRIAMCINOLONE ACETONIDE 1 MG/G
CREAM TOPICAL
Qty: 80 G | Refills: 0 | Status: SHIPPED
Start: 2022-03-14 | End: 2022-08-04 | Stop reason: SDUPTHER

## 2022-03-14 ASSESSMENT — ENCOUNTER SYMPTOMS
ROS SKIN COMMENTS: NO HAIR LOSS  NO DRY SKIN  NO BRITTLE NAILS
SHORTNESS OF BREATH: 0
DIARRHEA: 0
CONSTIPATION: 0

## 2022-03-14 NOTE — PROGRESS NOTES
Oscar Whatley, a female of 63 y.o.did a virtual visit on 3/14/2022. Patient Active Problem List   Diagnosis    OA (osteoarthritis)    Hypothyroidism    JOSE on CPAP    HTN (hypertension)    [facility]) [facility]    PFO with atrial septal aneurysm    Adult BMI >=70 kg/sq m ([facility]    Rt Hemiparesis following cerebrovascular accident (CVA) ([facility]    Neuropathy    Depression    Venous insufficiency of both lower extremities    FLAVIO (generalized anxiety disorder)    Varicose veins of left leg with edema    Cellulitis of right leg    Iron deficiency anemia    Postmenopausal vaginal bleeding    Complex endometrial hyperplasia with atypia    Lymphedema of both lower extremities    Pneumonia due to COVID-19 virus    Hemiparesis affecting right side as late effect of cerebrovascular accident (CVA) ([facility]    CKD (chronic kidney disease) stage 3, GFR 30-59 ml/min ([facility])    Morbid obesity with BMI of 60.0-69.9, adult ([facility]    Hypoprothrombinemia ([facility]          Hypertension  This is a chronic problem. The current episode started more than 1 year ago. The problem is uncontrolled. Associated symptoms include anxiety. Pertinent negatives include no chest pain, headaches, palpitations, peripheral edema or shortness of breath. (Recent stress with mom dying from MI and car crash 1 month ago. She's been handling all of her affairs. )      Wrist pain: doing better. Hypothyroidism: energy ok. Hyperglycemia: no inc thirst or urination. Allergies   Allergen Reactions    Pcn [Penicillins] Shortness Of Breath    Penicillin G Shortness Of Breath       Current Outpatient Medications on File Prior to Visit   Medication Sig Dispense Refill    warfarin (COUMADIN) 5 MG tablet TAKE ONE TABLET BY MOUTH DAILY ON SUNDAYS, MONDAYS, WEDNESDAYS AND FRIDAYS.  90 tablet 0    busPIRone (BUSPAR) 15 MG tablet TAKE 1 TABLET BY MOUTH TWICE DAILY 180 tablet 0    levothyroxine (SYNTHROID) 150 MCG tablet Take 1 tablet by mouth daily 90 tablet 0    atorvastatin (LIPITOR) 20 MG tablet Take 1 tablet by mouth daily 90 tablet 0    gabapentin (NEURONTIN) 600 MG tablet Take 1 tablet by mouth 2 times daily for 90 days. 180 tablet 0    loratadine (CLARITIN) 10 MG capsule Take 1 capsule by mouth daily 90 capsule 1    nystatin (MYCOSTATIN) 549738 UNIT/GM powder Apply 3 times daily. 60 g 1    baclofen (LIORESAL) 10 MG tablet Take 1 tablet by mouth 3 times daily as needed (muscle spasms) 90 tablet 0    vitamin D (ERGOCALCIFEROL) 1.25 MG (36382 UT) CAPS capsule Take 1 capsule by mouth once a week 12 capsule 0    citalopram (CELEXA) 40 MG tablet Take 1 tablet by mouth daily 90 tablet 0    diclofenac sodium (VOLTAREN) 1 % GEL Apply 4 g topically 4 times daily 100 g 1    Corn Starch (JOHNSONS BABY CORNSTARCH) POWD Apply in a.m. 392 g [place] by Does not apply route Semiautomatic 1 each 0    mupirocin (BACTROBAN) 2 % ointment Apply topically 3 times daily. 22 g 2    mineral oil-hydrophilic petrolatum (HYDROPHOR) ointment Apply topically as needed. 228 g 0    Disposable Gloves (LATEX GLOVES MEDIUM) MISC Use as needed. 100 each 0    Elastic Bandages & Supports (MEDICAL COMPRESSION STOCKINGS) MISC 1 each by Does not apply route daily 1 each 0    Magnesium Cl-Calcium Carbonate (SLOW-MAG PO) Take by mouth nightly      Lift Chair MISC by Does not apply route 1 each 0    acetaminophen (TYLENOL ARTHRITIS PAIN) 650 MG extended release tablet Take 1,300 mg by mouth every 8 hours as needed for Pain      CPAP Machine MISC by Does not apply route      levonorgestrel (MIRENA, 52 MG,) IUD 52 mg 1 each by Intrauterine route once      Misc. Devices (GEL-FOAM CUSHION) MISC For chair 1 each 0    benzonatate (TESSALON PERLES) 100 MG capsule Take 1 capsule by mouth 3 times daily as needed for Cough (Patient not taking: Reported on 3/14/2022) 30 capsule 0     No current facility-administered medications on file prior to visit. Review of Systems   Constitutional: Negative for fatigue. Respiratory: Negative for shortness of breath. Cardiovascular: Negative for chest pain, palpitations and leg swelling. Gastrointestinal: Negative for constipation and diarrhea. Endocrine: Negative for cold intolerance, heat intolerance, polydipsia and polyuria. Skin:        No hair loss  No dry skin  No brittle nails   Neurological: Negative for tremors and headaches. Psychiatric/Behavioral: Negative for dysphoric mood. The patient is not nervous/anxious. other review of systems reviewed and are negative    OBJECTIVE:  There were no vitals taken for this visit. Physical Exam  Constitutional:       General: She is not in acute distress. Appearance: Normal appearance. She is not ill-appearing, toxic-appearing or diaphoretic. HENT:      Head: Normocephalic. Eyes:      General: No scleral icterus. Pulmonary:      Effort: Pulmonary effort is normal.   Neurological:      Mental Status: She is alert and oriented to person, place, and time. Psychiatric:         Mood and Affect: Mood normal.         ASSESSMENT AND PLAN:    Babetta Fothergill was seen today for hypertension, hypothyroidism, chronic kidney disease and 3 month follow-up. Diagnoses and all orders for this visit:    Essential hypertension  -     [facility]    Dermatitis  -     triamcinolone (KENALOG) 0.1 % cream; APPLY TO AFFECTED AREA(S) TWICE A DAY. Acquired hypothyroidism  -     [facility] [address]    Glucose intolerance    Left wrist tendonitis  -     traMADol (ULTRAM) 50 MG tablet; Take 1 tablet by mouth Daily for 60 days. Morbid obesity with BMI of 60.0-69.9, adult ([facility]  -     [facility]    - monitor bp's at home and record and if still > 140/90 in 2 wks call and I will start her on med  - encourage low carb diet and exercise for wt loss  - would like to get labs done hga1c, cmp, tsh, T4, lipids.     Controlled substances monitoring: no signs of potential drug abuse or diversion identified. Return in about 3 months (around 6/14/2022), or if symptoms worsen or fail to improve with bp. Sybil Wasserman, DO    TeleMedicine Patient Consent    This visit was performed as a virtual video visit using a synchronous, two-way, audio-video telehealth technology platform. Patient identification was verified at the start of the visit, including the patient's telephone number and physical location. I discussed with the patient the nature of our telehealth visits, that:     1. Due to the nature of an audio- video modality, the only components of a physical exam that could be done are the elements supported by direct observation. 2. I would evaluate the patient and recommend diagnostics and treatments based on my assessment. 3. If it was felt that the patient should be evaluated in clinic or an emergency room setting, then they would be directed there. 4. Our sessions are not being recorded and that personal health information is protected. 5. Our team would provide follow up care in person if/when the patient needs it. Patient does agree to proceed with telemedicine consultation. Patient's location: home address in [facility]. Physician  location [facility]. other people involved in call none. Time spent: Not billed by time    This visit was completed virtually using Doxy. me    [employer], was evaluated through a synchronous (real-time (A/V encounter. The patient (or guardian if applicable (is aware that this is a billable service, which includes applicable co-pays. The virtual visit was conducted with patient's 9 and/or legal guardians (consent. The visit was conducted pursuant to the emergency declaration under the [place] act and the [place], [address] [place] [address] waiver authority and the coronavirus preparedness and response supplemental appropriations act.   Patient identification was verified, and a caregiver was present when appropriate. The patient was located in a state where the provider was licensed to provide care.

## 2022-04-18 DIAGNOSIS — E78.5 HYPERLIPIDEMIA, UNSPECIFIED HYPERLIPIDEMIA TYPE: ICD-10-CM

## 2022-04-18 DIAGNOSIS — R20.2 NUMBNESS AND TINGLING OF RIGHT ARM AND LEG: ICD-10-CM

## 2022-04-18 DIAGNOSIS — R20.0 NUMBNESS AND TINGLING OF RIGHT ARM AND LEG: ICD-10-CM

## 2022-04-18 DIAGNOSIS — F41.9 ANXIETY: ICD-10-CM

## 2022-04-18 RX ORDER — GABAPENTIN 600 MG/1
600 TABLET ORAL 2 TIMES DAILY
Qty: 180 TABLET | Refills: 0 | Status: SHIPPED
Start: 2022-04-18 | End: 2022-07-12

## 2022-04-18 RX ORDER — LEVOTHYROXINE SODIUM 0.15 MG/1
150 TABLET ORAL DAILY
Qty: 90 TABLET | Refills: 0 | Status: SHIPPED
Start: 2022-04-18 | End: 2022-10-28 | Stop reason: SDUPTHER

## 2022-04-18 RX ORDER — LORATADINE 10 MG/1
TABLET ORAL
Qty: 90 TABLET | Refills: 0 | Status: SHIPPED | OUTPATIENT
Start: 2022-04-18

## 2022-04-18 RX ORDER — BUSPIRONE HYDROCHLORIDE 15 MG/1
TABLET ORAL
Qty: 180 TABLET | Refills: 0 | Status: SHIPPED | OUTPATIENT
Start: 2022-04-18

## 2022-04-18 RX ORDER — ATORVASTATIN CALCIUM 20 MG/1
20 TABLET, FILM COATED ORAL DAILY
Qty: 90 TABLET | Refills: 0 | Status: SHIPPED
Start: 2022-04-18 | End: 2022-10-07

## 2022-04-26 ENCOUNTER — ANTI-COAG VISIT (OUTPATIENT)
Dept: PRIMARY CARE CLINIC | Age: 62
End: 2022-04-26

## 2022-04-26 LAB
INR BLD: 4.3
INR BLD: 4.3

## 2022-04-26 NOTE — PROGRESS NOTES
Change Coumadin dose to 2.5 mg on Tuesdays Thursdays and Sundays due to her elevated INR 5 mg all other days.

## 2022-05-12 ENCOUNTER — TELEPHONE (OUTPATIENT)
Dept: PRIMARY CARE CLINIC | Age: 62
End: 2022-05-12

## 2022-05-12 NOTE — TELEPHONE ENCOUNTER
----- Message from Fever sent at 5/12/2022  9:21 AM EDT -----  Subject: Appointment Request    Reason for Call: Semi-Routine Skin Problem    QUESTIONS  Type of Appointment? Established Patient  Reason for appointment request? Available appointments did not meet   patient need  Additional Information for Provider? Patient request Virtual visit, only   office available during search, patient has opening/wound on right leg  ---------------------------------------------------------------------------  --------------  CALL BACK INFO  What is the best way for the office to contact you? OK to leave message on   voicemail  Preferred Call Back Phone Number? [phone]  ---------------------------------------------------------------------------  --------------  SCRIPT ANSWERS  Relationship to Patient? Self  Are you having swelling in your throat or face? No  Are you having difficulty breathing? No  Have the symptoms worsened or spread in the last day? No  Are you having fevers (100.4), chills or sweats? No  Have you recently (14 days) seen a provider for this issue? No  Have you been diagnosed with, awaiting test results for, or told that you   are suspected of having COVID-19 (Coronavirus)? (If patient has tested   negative or was tested as a requirement for work, school, or travel and   not based on symptoms, answer no)? Yes  Did your symptoms begin within the past 10 days or was your positive test   result within the past 10 days? No  Within the past 10 days have you developed any of the following symptoms   (answer no if symptoms have been present longer than 10 days or began   more than 10 days ago)? Fever or Chills, Cough, Shortness of breath or   difficulty breathing, Loss of taste or smell, Sore throat, Nasal   congestion, Sneezing or runny nose, Fatigue or generalized body aches   (answer no if pain is specific to a body part e.g. back pain), Diarrhea,   Headache?  No  Have you had close contact with someone with COVID-19 in the last 7 days? No  (Service Expert  click yes below to proceed with CRAZE As Usual   Scheduling)?  Yes

## 2022-05-12 NOTE — TELEPHONE ENCOUNTER
Called pt to make her a vv apt for tomorrow. Was advised to go to Urgent care if the wound(s) get any worse.

## 2022-05-13 ENCOUNTER — TELEMEDICINE (OUTPATIENT)
Dept: PRIMARY CARE CLINIC | Age: 62
End: 2022-05-13
Payer: COMMERCIAL

## 2022-05-13 ENCOUNTER — TELEPHONE (OUTPATIENT)
Dept: PRIMARY CARE CLINIC | Age: 62
End: 2022-05-13

## 2022-05-13 DIAGNOSIS — I10 PRIMARY HYPERTENSION: Chronic | ICD-10-CM

## 2022-05-13 DIAGNOSIS — S81.002A OPEN WOUND OF LEFT KNEE, INITIAL ENCOUNTER: Primary | ICD-10-CM

## 2022-05-13 PROCEDURE — 1036F TOBACCO NON-USER: CPT | Performed by: FAMILY MEDICINE

## 2022-05-13 PROCEDURE — G8427 DOCREV CUR MEDS BY ELIG CLIN: HCPCS | Performed by: FAMILY MEDICINE

## 2022-05-13 PROCEDURE — 99213 OFFICE O/P EST LOW 20 MIN: CPT | Performed by: FAMILY MEDICINE

## 2022-05-13 PROCEDURE — 3017F COLORECTAL CA SCREEN DOC REV: CPT | Performed by: FAMILY MEDICINE

## 2022-05-13 PROCEDURE — G8417 CALC BMI ABV UP PARAM F/U: HCPCS | Performed by: FAMILY MEDICINE

## 2022-05-13 RX ORDER — CEPHALEXIN 500 MG/1
500 CAPSULE ORAL 3 TIMES DAILY
Qty: 21 CAPSULE | Refills: 0 | Status: SHIPPED | OUTPATIENT
Start: 2022-05-13 | End: 2022-05-20

## 2022-05-13 RX ORDER — ERGOCALCIFEROL 1.25 MG/1
50000 CAPSULE ORAL WEEKLY
Qty: 12 CAPSULE | Refills: 2 | Status: SHIPPED
Start: 2022-05-13 | End: 2022-08-04 | Stop reason: SDUPTHER

## 2022-05-13 RX ORDER — LISINOPRIL 10 MG/1
10 TABLET ORAL DAILY
Qty: 30 TABLET | Refills: 2 | Status: SHIPPED
Start: 2022-05-13 | End: 2022-06-10 | Stop reason: SDUPTHER

## 2022-05-13 ASSESSMENT — ENCOUNTER SYMPTOMS: SHORTNESS OF BREATH: 0

## 2022-05-13 NOTE — TELEPHONE ENCOUNTER
Pt called, wanted to see if she was able to be seen this morning instead of afternoon. Stated her home aide will be with her in the afternoon, wanted some privacy with her virtual josr.

## 2022-05-16 DIAGNOSIS — I69.359 HEMIPARESIS FOLLOWING CEREBROVASCULAR ACCIDENT (CVA) (HCC): Chronic | ICD-10-CM

## 2022-05-16 RX ORDER — BACLOFEN 10 MG/1
10 TABLET ORAL 3 TIMES DAILY PRN
Qty: 90 TABLET | Refills: 0 | Status: SHIPPED
Start: 2022-05-16 | End: 2022-07-12

## 2022-05-24 ENCOUNTER — ANTI-COAG VISIT (OUTPATIENT)
Dept: PRIMARY CARE CLINIC | Age: 62
End: 2022-05-24

## 2022-05-24 LAB
INR BLD: 1.9
INR BLD: 1.9

## 2022-05-25 ENCOUNTER — ANTI-COAG VISIT (OUTPATIENT)
Dept: PRIMARY CARE CLINIC | Age: 62
End: 2022-05-25

## 2022-05-31 ENCOUNTER — ANTI-COAG VISIT (OUTPATIENT)
Dept: PRIMARY CARE CLINIC | Age: 62
End: 2022-05-31

## 2022-05-31 LAB
INR BLD: 1.8
INR BLD: 1.8

## 2022-06-07 ENCOUNTER — ANTI-COAG VISIT (OUTPATIENT)
Dept: PRIMARY CARE CLINIC | Age: 62
End: 2022-06-07

## 2022-06-07 LAB — INR BLD: 1.9

## 2022-06-10 ENCOUNTER — TELEMEDICINE (OUTPATIENT)
Dept: PRIMARY CARE CLINIC | Age: 62
End: 2022-06-10
Payer: COMMERCIAL

## 2022-06-10 DIAGNOSIS — E78.5 HYPERLIPIDEMIA, UNSPECIFIED HYPERLIPIDEMIA TYPE: ICD-10-CM

## 2022-06-10 DIAGNOSIS — I10 PRIMARY HYPERTENSION: Primary | ICD-10-CM

## 2022-06-10 DIAGNOSIS — E03.9 ACQUIRED HYPOTHYROIDISM: ICD-10-CM

## 2022-06-10 PROCEDURE — G8421 BMI NOT CALCULATED: HCPCS | Performed by: FAMILY MEDICINE

## 2022-06-10 PROCEDURE — 3017F COLORECTAL CA SCREEN DOC REV: CPT | Performed by: FAMILY MEDICINE

## 2022-06-10 PROCEDURE — G8427 DOCREV CUR MEDS BY ELIG CLIN: HCPCS | Performed by: FAMILY MEDICINE

## 2022-06-10 PROCEDURE — 99213 OFFICE O/P EST LOW 20 MIN: CPT | Performed by: FAMILY MEDICINE

## 2022-06-10 PROCEDURE — 1036F TOBACCO NON-USER: CPT | Performed by: FAMILY MEDICINE

## 2022-06-10 RX ORDER — LISINOPRIL 20 MG/1
20 TABLET ORAL DAILY
Qty: 30 TABLET | Refills: 3 | Status: SHIPPED
Start: 2022-06-10 | End: 2022-09-06

## 2022-06-10 ASSESSMENT — ENCOUNTER SYMPTOMS
COUGH: 0
RHINORRHEA: 0
SINUS PAIN: 0
SHORTNESS OF BREATH: 0
DIARRHEA: 0
CONSTIPATION: 0
SORE THROAT: 0
SINUS PRESSURE: 0

## 2022-06-10 NOTE — PROGRESS NOTES
Aniket Mackey, a female of 63 y.o.did a virtual visit on 6/10/2022. Patient Active Problem List   Diagnosis    OA (osteoarthritis)    Hypothyroidism    JOSE on CPAP    HTN (hypertension)    [facility]    PFO with atrial septal aneurysm    Adult BMI >=70 kg/sq m ([facility]    Rt Hemiparesis following cerebrovascular accident (CVA) ([facility]    Neuropathy    Depression    Venous insufficiency of both lower extremities    FLAVIO (generalized anxiety disorder)    Varicose veins of left leg with edema    Cellulitis of right leg    Iron deficiency anemia    Postmenopausal vaginal bleeding    Complex endometrial hyperplasia with atypia    Lymphedema of both lower extremities    Pneumonia due to COVID-19 virus    Hemiparesis affecting right side as late effect of cerebrovascular accident (CVA) ([facility]    CKD (chronic kidney disease) stage 3, GFR 30-59 ml/min ([facility])    Morbid obesity with BMI of 60.0-69.9, adult ([facility]    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 The current episode started more than 1 month ago. Condition status: ave are 140's/90's. Pertinent negatives include no chest pain, headaches, palpitations, peripheral edema or shortness of breath. There are no compliance problems. Hyperlipidemia  This is a chronic problem. The current episode started more than 1 year ago. The problem is controlled. Pertinent negatives include no chest pain, leg pain, myalgias or shortness of breath. Current antihyperlipidemic treatment includes statins. There are no compliance problems. Hypothyroidism: energy level ok. Gen: has aide to give her a bath and empty her commode.      Ckd: stage 3a    Allergies   Allergen Reactions    Pcn [Penicillins] Shortness Of Breath    Penicillin G Shortness Of Breath       Current Outpatient Medications on File Prior to Visit   Medication Sig Dispense Refill    baclofen (LIORESAL) 10 MG tablet Take 1 tablet by mouth 3 times daily as needed (muscle spasms) 90 tablet 0    vitamin D (ERGOCALCIFEROL) 1.25 MG (75114 UT) CAPS capsule Take 1 capsule by mouth once a week 12 capsule 2    atorvastatin (LIPITOR) 20 MG tablet Take 1 tablet by mouth daily 90 tablet 0    busPIRone (BUSPAR) 15 MG tablet TAKE 1 TABLET BY MOUTH TWICE DAILY 180 tablet 0    gabapentin (NEURONTIN) 600 MG tablet Take 1 tablet by mouth 2 times daily for 90 days. 180 tablet 0    levothyroxine (SYNTHROID) 150 MCG tablet Take 1 tablet by mouth daily 90 tablet 0    ALLERGY RELIEF 10 MG tablet TAKE 1 TABLET BY MOUTH DAILY 90 tablet 0    triamcinolone (KENALOG) 0.1 % cream APPLY TO AFFECTED AREA(S) TWICE A DAY. 80 g 0    warfarin (COUMADIN) 5 MG tablet TAKE ONE TABLET BY MOUTH DAILY ON SUNDAYS, MONDAYS, WEDNESDAYS AND FRIDAYS. 90 tablet 0    nystatin (MYCOSTATIN) 528273 UNIT/GM powder Apply 3 times daily. 60 g 1    citalopram (CELEXA) 40 MG tablet Take 1 tablet by mouth daily 90 tablet 0    diclofenac sodium (VOLTAREN) 1 % GEL Apply 4 g topically 4 times daily 100 g 1    Corn Starch (JOHNSONS BABY CORNSTARCH) POWD Apply in a.m. 392 g [place] by Does not apply route Semiautomatic 1 each 0    mupirocin (BACTROBAN) 2 % ointment Apply topically 3 times daily. 22 g 2    mineral oil-hydrophilic petrolatum (HYDROPHOR) ointment Apply topically as needed. 228 g 0    Disposable Gloves (LATEX GLOVES MEDIUM) MISC Use as needed. 100 each 0    Elastic Bandages & Supports (MEDICAL COMPRESSION STOCKINGS) MISC 1 each by Does not apply route daily 1 each 0    Magnesium Cl-Calcium Carbonate (SLOW-MAG PO) Take by mouth nightly      Lift Chair MISC by Does not apply route 1 each 0    acetaminophen (TYLENOL ARTHRITIS PAIN) 650 MG extended release tablet Take 1,300 mg by mouth every 8 hours as needed for Pain      CPAP Machine MISC by Does not apply route      levonorgestrel (MIRENA, 52 MG,) IUD 52 mg 1 each by Intrauterine route once      Misc.  Devices (GEL-FOAM CUSHION) MISC For chair 1 each 0     No current facility-administered medications on file prior to visit. Review of Systems   Constitutional: Negative for chills and fever. HENT: Negative for congestion, ear pain, postnasal drip, rhinorrhea, sinus pressure, sinus pain and sore throat. Respiratory: Negative for cough and shortness of breath. Cardiovascular: Negative for chest pain, palpitations and leg swelling. Gastrointestinal: Negative for constipation and diarrhea. Endocrine: Negative for cold intolerance, heat intolerance, polydipsia and polyuria. Musculoskeletal: Negative for myalgias. Neurological: Negative for headaches. Psychiatric/Behavioral: Positive for dysphoric mood (due to losing her mother. ). other review of systems reviewed and are negative    OBJECTIVE:  There were no vitals taken for this visit. Physical Exam  Constitutional:       General: She is not in acute distress. Appearance: Normal appearance. She is not ill-appearing, toxic-appearing or diaphoretic. HENT:      Head: Normocephalic. Eyes:      General: No scleral icterus. Pulmonary:      Effort: Pulmonary effort is normal.   Neurological:      Mental Status: She is alert and oriented to person, place, and time. Psychiatric:         Mood and Affect: Mood normal.         ASSESSMENT AND PLAN:    Klarissa Patel was seen today for hypertension, chronic kidney disease, hypothyroidism and 3 month follow-up. Diagnoses and all orders for this visit:    Primary hypertension  -     lisinopril (PRINIVIL) 20 MG tablet; Take 1 tablet by mouth daily  -     Comprehensive Metabolic Panel; Future    Hyperlipidemia, unspecified hyperlipidemia type  -     Lipid Panel; Future    Acquired hypothyroidism  -     T4, Free; Future  -     TSH; Future    - increase Prinivil to 20 mg daily. - check checking bp  - get labs when able. - encourage diet and exercise for wt loss.     Return in about 3 months (around 9/10/2022). Claudeen Juniper, DO    TeleMedicine Patient Consent    This visit was performed as a virtual video visit using a synchronous, two-way, audio-video telehealth technology platform. Patient identification was verified at the start of the visit, including the patient's telephone number and physical location. I discussed with the patient the nature of our telehealth visits, that:     1. Due to the nature of an audio- video modality, the only components of a physical exam that could be done are the elements supported by direct observation. 2. I would evaluate the patient and recommend diagnostics and treatments based on my assessment. 3. If it was felt that the patient should be evaluated in clinic or an emergency room setting, then they would be directed there. 4. Our sessions are not being recorded and that personal health information is protected. 5. Our team would provide follow up care in person if/when the patient needs it. Patient does agree to proceed with telemedicine consultation. Patient's location: home address in [facility]. Physician  location [facility]. other people involved in call none. Time spent: Not billed by time    This visit was completed virtually using Doxy. nickolas [place], was evaluated through a synchronous (real-time (A/V encounter. The patient (or guardian if applicable (is aware that this is a billable service, which includes applicable co-pays. The virtual visit was conducted with patient's 9 and/or legal guardians (consent. The visit was conducted pursuant to the emergency declaration under the [place] act and the [place], [address] [place] [address] [facility] authority and the coronavirus preparedness and response supplemental appropriations act. Patient identification was verified, and a caregiver was present when appropriate. The patient was located in a state where the provider was licensed to provide care.

## 2022-06-21 LAB — INR BLD: 1.8

## 2022-06-22 ENCOUNTER — ANTI-COAG VISIT (OUTPATIENT)
Dept: PRIMARY CARE CLINIC | Age: 62
End: 2022-06-22

## 2022-06-22 NOTE — PROGRESS NOTES
Increase Coumadin dose on Tuesdays to 5 mg. Therefore only take 2.5 mg of Coumadin on Sundays and Thursdays and 5 mg all other days.   Continue current Coumadin checks at home

## 2022-06-23 ENCOUNTER — TELEPHONE (OUTPATIENT)
Dept: PRIMARY CARE CLINIC | Age: 62
End: 2022-06-23

## 2022-06-23 NOTE — TELEPHONE ENCOUNTER
Continue current blood pressure dose of lisinopril 20 mg. Continue to monitor blood pressures over the next 2 weeks. Let us know how her diastolic blood pressures are doing then. If she can find out how to get her blood drawn at home and a phone number to contact let us know and I can order labs for her.

## 2022-06-23 NOTE — TELEPHONE ENCOUNTER
Patient is not sure how to go about having her blood drawn at home. Would she be a candidate for skilled  nursing?

## 2022-06-23 NOTE — TELEPHONE ENCOUNTER
Patient informed that her diastolic BP has been in the range of mid-eighties to 90 since increasing her lisinopril to 20mg. She also was asking about having her labs drawn at home. Please advise, thanks.

## 2022-06-28 DIAGNOSIS — F41.9 ANXIETY: ICD-10-CM

## 2022-06-28 RX ORDER — CITALOPRAM 40 MG/1
TABLET ORAL
Qty: 90 TABLET | Refills: 0 | Status: SHIPPED
Start: 2022-06-28 | End: 2022-10-07

## 2022-07-08 ENCOUNTER — PATIENT MESSAGE (OUTPATIENT)
Dept: PRIMARY CARE CLINIC | Age: 62
End: 2022-07-08

## 2022-07-08 NOTE — TELEPHONE ENCOUNTER
From: Rafiq Caceres  To: Dr. Mariano Bene: 7/8/2022 6:42 AM EDT  Subject: New mattress    Could you please send a request to [facility] so o can get a new mattress.  Thank you

## 2022-07-12 DIAGNOSIS — R20.2 NUMBNESS AND TINGLING OF RIGHT ARM AND LEG: ICD-10-CM

## 2022-07-12 DIAGNOSIS — R20.0 NUMBNESS AND TINGLING OF RIGHT ARM AND LEG: ICD-10-CM

## 2022-07-12 DIAGNOSIS — I69.359 HEMIPARESIS FOLLOWING CEREBROVASCULAR ACCIDENT (CVA) (HCC): Chronic | ICD-10-CM

## 2022-07-12 RX ORDER — GABAPENTIN 600 MG/1
600 TABLET ORAL 2 TIMES DAILY
Qty: 180 TABLET | Refills: 0 | Status: SHIPPED | OUTPATIENT
Start: 2022-07-12 | End: 2022-10-10

## 2022-07-12 RX ORDER — BACLOFEN 10 MG/1
10 TABLET ORAL 3 TIMES DAILY PRN
Qty: 90 TABLET | Refills: 0 | Status: SHIPPED
Start: 2022-07-12 | End: 2022-08-23

## 2022-08-04 DIAGNOSIS — L30.9 DERMATITIS: ICD-10-CM

## 2022-08-04 RX ORDER — ERGOCALCIFEROL 1.25 MG/1
50000 CAPSULE ORAL WEEKLY
Qty: 12 CAPSULE | Refills: 1 | Status: SHIPPED
Start: 2022-08-04 | End: 2022-10-23 | Stop reason: SDUPTHER

## 2022-08-04 RX ORDER — TRIAMCINOLONE ACETONIDE 1 MG/G
CREAM TOPICAL
Qty: 80 G | Refills: 0 | Status: SHIPPED
Start: 2022-08-04 | End: 2022-10-23 | Stop reason: SDUPTHER

## 2022-08-16 ENCOUNTER — ANTI-COAG VISIT (OUTPATIENT)
Dept: PRIMARY CARE CLINIC | Age: 62
End: 2022-08-16

## 2022-08-16 LAB — INR BLD: 1.9

## 2022-08-23 DIAGNOSIS — I69.359 HEMIPARESIS FOLLOWING CEREBROVASCULAR ACCIDENT (CVA) (HCC): Chronic | ICD-10-CM

## 2022-08-23 RX ORDER — BACLOFEN 10 MG/1
TABLET ORAL
Qty: 90 TABLET | Refills: 0 | Status: SHIPPED
Start: 2022-08-23 | End: 2022-10-07

## 2022-08-30 ENCOUNTER — ANTI-COAG VISIT (OUTPATIENT)
Dept: PRIMARY CARE CLINIC | Age: 62
End: 2022-08-30

## 2022-08-30 LAB — INR BLD: 1.7

## 2022-08-31 NOTE — PROGRESS NOTES
Increase Coumadin to 5 mg every day.   She was taking 2.5 mg on Sundays and Thursdays and 5 mg all other days

## 2022-09-06 DIAGNOSIS — I10 PRIMARY HYPERTENSION: ICD-10-CM

## 2022-09-06 RX ORDER — LISINOPRIL 20 MG/1
TABLET ORAL
Qty: 30 TABLET | Refills: 5 | Status: SHIPPED | OUTPATIENT
Start: 2022-09-06

## 2022-09-09 ENCOUNTER — TELEMEDICINE (OUTPATIENT)
Dept: PRIMARY CARE CLINIC | Age: 62
End: 2022-09-09
Payer: COMMERCIAL

## 2022-09-09 DIAGNOSIS — I69.359 HEMIPARESIS FOLLOWING CEREBROVASCULAR ACCIDENT (CVA) (HCC): ICD-10-CM

## 2022-09-09 DIAGNOSIS — I10 PRIMARY HYPERTENSION: Primary | ICD-10-CM

## 2022-09-09 DIAGNOSIS — F41.9 ANXIETY: ICD-10-CM

## 2022-09-09 PROCEDURE — 1036F TOBACCO NON-USER: CPT | Performed by: FAMILY MEDICINE

## 2022-09-09 PROCEDURE — G8427 DOCREV CUR MEDS BY ELIG CLIN: HCPCS | Performed by: FAMILY MEDICINE

## 2022-09-09 PROCEDURE — G8421 BMI NOT CALCULATED: HCPCS | Performed by: FAMILY MEDICINE

## 2022-09-09 PROCEDURE — 3017F COLORECTAL CA SCREEN DOC REV: CPT | Performed by: FAMILY MEDICINE

## 2022-09-09 PROCEDURE — 99213 OFFICE O/P EST LOW 20 MIN: CPT | Performed by: FAMILY MEDICINE

## 2022-09-09 ASSESSMENT — ENCOUNTER SYMPTOMS
SHORTNESS OF BREATH: 0
COUGH: 0

## 2022-09-09 NOTE — PROGRESS NOTES
Galye Verduzco, a female of 63 y.o.did a virtual visit on 9/9/2022. Patient Active Problem List   Diagnosis    OA (osteoarthritis)    Hypothyroidism    JOSE on CPAP    HTN (hypertension)    PETRA (acute kidney injury) ([facility]    PFO with atrial septal aneurysm    Adult BMI >=70 kg/sq m ([facility]    Rt Hemiparesis following cerebrovascular accident (CVA) ([facility]    Neuropathy    Depression    Venous insufficiency of both lower extremities    FLAVIO (generalized anxiety disorder)    Varicose veins of left leg with edema    Cellulitis of right leg    Iron deficiency anemia    Postmenopausal vaginal bleeding    Complex endometrial hyperplasia with atypia    Lymphedema of both lower extremities    Pneumonia due to COVID-19 virus    Hemiparesis affecting right side as late effect of cerebrovascular accident (CVA) ([facility]    CKD (chronic kidney disease) stage 3, GFR 30-59 ml/min ([facility])    Morbid obesity with BMI of 60.0-69.9, adult ([facility]    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 The current episode started more than 1 year ago. The problem has been gradually improving since onset. The problem is controlled (120's/60's since increase in Prinivil.). Pertinent negatives include no chest pain, headaches, palpitations, peripheral edema or shortness of breath. The current treatment provides significant improvement. There are no compliance problems. Gen: wanting to build a house in [facility]. Still unable to get out of house. Depression/anxiety: Moods better since now has new home health company that she trusts. Doing well on meds. Cva: coumadin level ok. R arm shakes still. Able to walk around house with walker.      Allergies   Allergen Reactions    Pcn [Penicillins] Shortness Of Breath    Penicillin G Shortness Of Breath       Current Outpatient Medications on File Prior to Visit   Medication Sig Dispense Refill    lisinopril (PRINIVIL;ZESTRIL) 20 MG tablet TAKE ONE TABLET BY MOUTH EVERY DAY 30 tablet 5    baclofen (LIORESAL) 10 MG tablet TAKE ONE TABLET BY MOUTH 3 TIMES A DAY AS NEEDED FOR MUSCLE SPASMS. 90 tablet 0    triamcinolone (KENALOG) 0.1 % cream APPLY TO AFFECTED AREA(S) TWICE A DAY. 80 g 0    vitamin D (ERGOCALCIFEROL) 1.25 MG (99276 UT) CAPS capsule Take 1 capsule by mouth once a week 12 capsule 1    gabapentin (NEURONTIN) 600 MG tablet Take 1 tablet by mouth 2 times daily for 90 days. 180 tablet 0    citalopram (CELEXA) 40 MG tablet TAKE ONE TABLET BY MOUTH EVERY DAY 90 tablet 0    atorvastatin (LIPITOR) 20 MG tablet Take 1 tablet by mouth daily 90 tablet 0    busPIRone (BUSPAR) 15 MG tablet TAKE 1 TABLET BY MOUTH TWICE DAILY 180 tablet 0    levothyroxine (SYNTHROID) 150 MCG tablet Take 1 tablet by mouth daily 90 tablet 0    ALLERGY RELIEF 10 MG tablet TAKE 1 TABLET BY MOUTH DAILY 90 tablet 0    warfarin (COUMADIN) 5 MG tablet TAKE ONE TABLET BY MOUTH DAILY ON SUNDAYS, MONDAYS, WEDNESDAYS AND FRIDAYS. 90 tablet 0    nystatin (MYCOSTATIN) 973945 UNIT/GM powder Apply 3 times daily. 60 g 1    diclofenac sodium (VOLTAREN) 1 % GEL Apply 4 g topically 4 times daily 100 g 1    Corn Starch (JOHNSONS BABY CORNSTARCH) POWD Apply in a.m. 392 g 1    Hospital Bed MISC by Does not apply route Semiautomatic 1 each 0    mupirocin (BACTROBAN) 2 % ointment Apply topically 3 times daily. 22 g 2    mineral oil-hydrophilic petrolatum (HYDROPHOR) ointment Apply topically as needed. 228 g 0    Disposable Gloves (LATEX GLOVES MEDIUM) MISC Use as needed.  100 each 0    Elastic Bandages & Supports (MEDICAL COMPRESSION STOCKINGS) MISC 1 each by Does not apply route daily 1 each 0    Magnesium Cl-Calcium Carbonate (SLOW-MAG PO) Take by mouth nightly      Lift Chair MISC by Does not apply route 1 each 0    acetaminophen (TYLENOL ARTHRITIS PAIN) 650 MG extended release tablet Take 1,300 mg by mouth every 8 hours as needed for Pain      CPAP Machine MISC by Does not apply route      levonorgestrel (MIRENA, 52 MG,) IUD 52 mg 1 each by Intrauterine route once      Misc. Devices (GEL-FOAM CUSHION) MISC For chair 1 each 0     No current facility-administered medications on file prior to visit. Review of Systems   Respiratory:  Negative for cough and shortness of breath. Cardiovascular:  Negative for chest pain and palpitations. Neurological:  Negative for headaches. other review of systems reviewed and are negative    OBJECTIVE:  There were no vitals taken for this visit. Physical Exam  Constitutional:       General: She is not in acute distress. Appearance: Normal appearance. She is not ill-appearing, toxic-appearing or diaphoretic. HENT:      Head: Normocephalic. Eyes:      General: No scleral icterus. Pulmonary:      Effort: Pulmonary effort is normal.   Neurological:      Mental Status: She is alert and oriented to person, place, and time. Psychiatric:         Mood and Affect: Mood normal.       ASSESSMENT AND PLAN:    Elsa Cramer was seen today for hypertension. Diagnoses and all orders for this visit:    Primary hypertension    Anxiety    Rt Hemiparesis following cerebrovascular accident (CVA) ([facility]    - bp stable on current dose of Prinivil   - make inpatient ov when able to come into office.  - encourage diet and exercise    Return in about 4 months (around 1/9/2023) for or for acute problem. Damian Flores, DO    TeleMedicine Patient Consent    This visit was performed as a virtual video visit using a synchronous, two-way, audio-video telehealth technology platform. Patient identification was verified at the start of the visit, including the patient's telephone number and physical location. I discussed with the patient the nature of our telehealth visits, that:     Due to the nature of an audio- video modality, the only components of a physical exam that could be done are the elements supported by direct observation.   I would evaluate the patient and recommend diagnostics and treatments based on my assessment. If it was felt that the patient should be evaluated in clinic or an emergency room setting, then they would be directed there. Our sessions are not being recorded and that personal health information is protected. Our team would provide follow up care in person if/when the patient needs it. Patient does agree to proceed with telemedicine consultation. Patient's location: home address in [facility]. Physician  location [facility]. other people involved in call none. Time spent: Not billed by time    This visit was completed virtually using Doxy. nickolas [place], was evaluated through a synchronous (real-time (A/V encounter. The patient (or guardian if applicable (is aware that this is a billable service, which includes applicable co-pays. The virtual visit was conducted with patient's 9 and/or legal guardians (consent. The visit was conducted pursuant to the emergency declaration under the [place] act and the [place], [address] [place] [address] waiver authority and the coronavirus preparedness and response supplemental appropriations act. Patient identification was verified, and a caregiver was present when appropriate. The patient was located in a state where the provider was licensed to provide care.

## 2022-09-20 ENCOUNTER — ANTI-COAG VISIT (OUTPATIENT)
Dept: PRIMARY CARE CLINIC | Age: 62
End: 2022-09-20

## 2022-09-20 LAB — INR BLD: 1.8

## 2022-09-26 ENCOUNTER — TELEPHONE (OUTPATIENT)
Dept: PRIMARY CARE CLINIC | Age: 62
End: 2022-09-26

## 2022-09-26 DIAGNOSIS — M79.642 CHRONIC HAND PAIN, LEFT: Primary | ICD-10-CM

## 2022-09-26 DIAGNOSIS — G89.29 CHRONIC HAND PAIN, LEFT: Primary | ICD-10-CM

## 2022-09-26 RX ORDER — TRAMADOL HYDROCHLORIDE 50 MG/1
50 TABLET ORAL DAILY PRN
Qty: 30 TABLET | Refills: 0 | Status: SHIPPED | OUTPATIENT
Start: 2022-09-26 | End: 2022-10-26

## 2022-10-07 DIAGNOSIS — F41.9 ANXIETY: ICD-10-CM

## 2022-10-07 DIAGNOSIS — I69.359 HEMIPARESIS FOLLOWING CEREBROVASCULAR ACCIDENT (CVA) (HCC): Chronic | ICD-10-CM

## 2022-10-07 DIAGNOSIS — E78.5 HYPERLIPIDEMIA, UNSPECIFIED HYPERLIPIDEMIA TYPE: ICD-10-CM

## 2022-10-07 RX ORDER — WARFARIN SODIUM 5 MG/1
TABLET ORAL
Qty: 90 TABLET | Refills: 0 | Status: SHIPPED | OUTPATIENT
Start: 2022-10-07

## 2022-10-07 RX ORDER — ATORVASTATIN CALCIUM 20 MG/1
20 TABLET, FILM COATED ORAL DAILY
Qty: 90 TABLET | Refills: 0 | Status: SHIPPED | OUTPATIENT
Start: 2022-10-07

## 2022-10-07 RX ORDER — BACLOFEN 10 MG/1
TABLET ORAL
Qty: 90 TABLET | Refills: 0 | Status: SHIPPED | OUTPATIENT
Start: 2022-10-07

## 2022-10-07 RX ORDER — CITALOPRAM 40 MG/1
TABLET ORAL
Qty: 90 TABLET | Refills: 0 | Status: SHIPPED | OUTPATIENT
Start: 2022-10-07

## 2022-10-11 ENCOUNTER — TELEPHONE (OUTPATIENT)
Dept: PRIMARY CARE CLINIC | Age: 62
End: 2022-10-11

## 2022-10-11 NOTE — TELEPHONE ENCOUNTER
Patient called with a critically low INR reading of 0.8. She is taking 5mg coumadin daily. Please advise.

## 2022-10-12 ENCOUNTER — TELEPHONE (OUTPATIENT)
Dept: PRIMARY CARE CLINIC | Age: 62
End: 2022-10-12

## 2022-10-14 ENCOUNTER — PATIENT MESSAGE (OUTPATIENT)
Dept: PRIMARY CARE CLINIC | Age: 62
End: 2022-10-14

## 2022-10-14 DIAGNOSIS — R29.898 WEAKNESS OF BOTH LEGS: Primary | ICD-10-CM

## 2022-10-17 NOTE — TELEPHONE ENCOUNTER
From: Isaac Dandy  To: Dr. Chicas Lis: 10/14/2022 9:32 PM EDT  Subject: Therapy    Good morning could I get some therapy at home please.  I can't walk very far and I need to walk to fromt door and out  Thank you

## 2022-10-21 ENCOUNTER — TELEPHONE (OUTPATIENT)
Dept: PRIMARY CARE CLINIC | Age: 62
End: 2022-10-21

## 2022-10-21 DIAGNOSIS — R20.0 NUMBNESS AND TINGLING OF RIGHT ARM AND LEG: ICD-10-CM

## 2022-10-21 DIAGNOSIS — R29.898 WEAKNESS OF BOTH LEGS: Primary | ICD-10-CM

## 2022-10-21 DIAGNOSIS — R20.2 NUMBNESS AND TINGLING OF RIGHT ARM AND LEG: ICD-10-CM

## 2022-10-21 NOTE — TELEPHONE ENCOUNTER
Patient called and said Cornell Timmons will draw blood so place lab orders and place referral for occupational therapy to please will fax over to [phone]

## 2022-10-23 DIAGNOSIS — L30.9 DERMATITIS: ICD-10-CM

## 2022-10-24 RX ORDER — ERGOCALCIFEROL 1.25 MG/1
50000 CAPSULE ORAL WEEKLY
Qty: 12 CAPSULE | Refills: 1 | Status: SHIPPED | OUTPATIENT
Start: 2022-10-24

## 2022-10-24 RX ORDER — TRIAMCINOLONE ACETONIDE 1 MG/G
CREAM TOPICAL
Qty: 80 G | Refills: 0 | Status: SHIPPED | OUTPATIENT
Start: 2022-10-24

## 2022-10-28 RX ORDER — LEVOTHYROXINE SODIUM 0.15 MG/1
150 TABLET ORAL DAILY
Qty: 90 TABLET | Refills: 0 | OUTPATIENT
Start: 2022-10-28

## 2022-10-29 RX ORDER — LEVOTHYROXINE SODIUM 0.15 MG/1
150 TABLET ORAL DAILY
Qty: 90 TABLET | Refills: 0 | Status: SHIPPED | OUTPATIENT
Start: 2022-10-29

## 2022-11-03 LAB
ALBUMIN SERPL-MCNC: 3.8 G/DL
ALP BLD-CCNC: 131 U/L
ALT SERPL-CCNC: 18 U/L
ANION GAP SERPL CALCULATED.3IONS-SCNC: 11 MMOL/L
AST SERPL-CCNC: 13 U/L
BASOPHILS ABSOLUTE: 0.05 /ΜL
BASOPHILS RELATIVE PERCENT: 0.5 %
BILIRUB SERPL-MCNC: 0.5 MG/DL (ref 0.1–1.4)
BUN BLDV-MCNC: 22 MG/DL
CALCIUM SERPL-MCNC: 10.2 MG/DL
CHLORIDE BLD-SCNC: 105 MMOL/L
CHOLESTEROL, TOTAL: 132 MG/DL
CHOLESTEROL/HDL RATIO: 2
CO2: 29 MMOL/L
CREAT SERPL-MCNC: 1.39 MG/DL
EOSINOPHILS ABSOLUTE: 0.19 /ΜL
EOSINOPHILS RELATIVE PERCENT: 2 %
GFR CALCULATED: 41
GLUCOSE BLD-MCNC: 83 MG/DL
HCT VFR BLD CALC: 46.5 % (ref 36–46)
HDLC SERPL-MCNC: 65 MG/DL (ref 35–70)
HEMOGLOBIN: 14.4 G/DL (ref 12–16)
LDL CHOLESTEROL CALCULATED: 46 MG/DL (ref 0–160)
LYMPHOCYTES ABSOLUTE: 1.71 /ΜL
LYMPHOCYTES RELATIVE PERCENT: 17.8 %
MCH RBC QN AUTO: 27.7 PG
MCHC RBC AUTO-ENTMCNC: 31 G/DL
MCV RBC AUTO: 89.6 FL
MONOCYTES ABSOLUTE: 0.34 /ΜL
MONOCYTES RELATIVE PERCENT: 3.5 %
NEUTROPHILS ABSOLUTE: 7.29 /ΜL
NEUTROPHILS RELATIVE PERCENT: 75.9 %
NONHDLC SERPL-MCNC: NORMAL MG/DL
PDW BLD-RTO: 16.9 %
PLATELET # BLD: 270 K/ΜL
PMV BLD AUTO: 11.1 FL
POTASSIUM SERPL-SCNC: 4.7 MMOL/L
RBC # BLD: 5.19 10^6/ΜL
SODIUM BLD-SCNC: 140 MMOL/L
TOTAL PROTEIN: 8.2
TRIGL SERPL-MCNC: 104 MG/DL
TSH SERPL DL<=0.05 MIU/L-ACNC: 0.59 UIU/ML
VLDLC SERPL CALC-MCNC: NORMAL MG/DL
WBC # BLD: 9.6 10^3/ML

## 2022-11-04 DIAGNOSIS — E03.9 ACQUIRED HYPOTHYROIDISM: ICD-10-CM

## 2022-11-04 DIAGNOSIS — E78.5 HYPERLIPIDEMIA, UNSPECIFIED HYPERLIPIDEMIA TYPE: ICD-10-CM

## 2022-11-04 DIAGNOSIS — I10 PRIMARY HYPERTENSION: ICD-10-CM

## 2022-11-22 ENCOUNTER — ANTI-COAG VISIT (OUTPATIENT)
Dept: PRIMARY CARE CLINIC | Age: 62
End: 2022-11-22

## 2022-11-22 LAB — INR BLD: 3.7

## 2022-11-30 ENCOUNTER — TELEPHONE (OUTPATIENT)
Dept: PRIMARY CARE CLINIC | Age: 62
End: 2022-11-30

## 2022-11-30 ENCOUNTER — ANTI-COAG VISIT (OUTPATIENT)
Dept: PRIMARY CARE CLINIC | Age: 62
End: 2022-11-30

## 2022-11-30 LAB — INR BLD: 4

## 2022-11-30 NOTE — PROGRESS NOTES
Continue Coumadin 5 mg every day except on Mondays and Thursday take half of 5 mg or 2.5 mg.   Continue routine INR checks weekly

## 2022-11-30 NOTE — TELEPHONE ENCOUNTER
Returned patient's call regarding her recent INR. We did not receive fax with result. Patient did not answer, LM for her to call back with her INR result so that we may inform Dr for his recommendation.

## 2022-12-01 ENCOUNTER — TELEPHONE (OUTPATIENT)
Dept: PRIMARY CARE CLINIC | Age: 62
End: 2022-12-01

## 2022-12-01 RX ORDER — NYSTATIN 100000 [USP'U]/G
POWDER TOPICAL
Qty: 60 G | Refills: 0 | Status: SHIPPED | OUTPATIENT
Start: 2022-12-01

## 2022-12-01 NOTE — TELEPHONE ENCOUNTER
[facility] called requesting nystatin powder. Pt has bad red spots behind both of her knees that has an odor to it. They explain that the pt can't straighten her legs so there is no air flow back there.  [address] [place] in [place] from [facility]: [phone]

## 2022-12-03 DIAGNOSIS — I69.359 HEMIPARESIS FOLLOWING CEREBROVASCULAR ACCIDENT (CVA) (HCC): Chronic | ICD-10-CM

## 2022-12-05 RX ORDER — BACLOFEN 10 MG/1
TABLET ORAL
Qty: 90 TABLET | Refills: 0 | Status: SHIPPED | OUTPATIENT
Start: 2022-12-05

## 2022-12-06 ENCOUNTER — ANTI-COAG VISIT (OUTPATIENT)
Dept: PRIMARY CARE CLINIC | Age: 62
End: 2022-12-06

## 2022-12-06 LAB — INR BLD: 4.2

## 2022-12-06 NOTE — PROGRESS NOTES
Decrease Coumadin to 2.5 mg Monday, Wednesdays, Fridays, and Sundays. Take 5 mg on Tuesdays, Thursdays, and Saturdays. Recheck INR 1 week.

## 2022-12-13 ENCOUNTER — TELEPHONE (OUTPATIENT)
Dept: PRIMARY CARE CLINIC | Age: 62
End: 2022-12-13

## 2022-12-26 DIAGNOSIS — L30.9 DERMATITIS: ICD-10-CM

## 2022-12-27 RX ORDER — TRIAMCINOLONE ACETONIDE 1 MG/G
CREAM TOPICAL
Qty: 80 G | Refills: 0 | Status: SHIPPED | OUTPATIENT
Start: 2022-12-27

## 2023-01-03 DIAGNOSIS — F41.9 ANXIETY: ICD-10-CM

## 2023-01-03 RX ORDER — CITALOPRAM 40 MG/1
TABLET ORAL
Qty: 90 TABLET | Refills: 0 | Status: SHIPPED | OUTPATIENT
Start: 2023-01-03

## 2023-01-04 DIAGNOSIS — I10 PRIMARY HYPERTENSION: ICD-10-CM

## 2023-01-04 DIAGNOSIS — E78.5 HYPERLIPIDEMIA, UNSPECIFIED HYPERLIPIDEMIA TYPE: ICD-10-CM

## 2023-01-04 RX ORDER — LISINOPRIL 20 MG/1
TABLET ORAL
Qty: 90 TABLET | Refills: 0 | Status: SHIPPED | OUTPATIENT
Start: 2023-01-04

## 2023-01-04 RX ORDER — ATORVASTATIN CALCIUM 20 MG/1
20 TABLET, FILM COATED ORAL DAILY
Qty: 90 TABLET | Refills: 0 | Status: SHIPPED | OUTPATIENT
Start: 2023-01-04

## 2023-01-06 ENCOUNTER — TELEMEDICINE (OUTPATIENT)
Dept: PRIMARY CARE CLINIC | Age: 63
End: 2023-01-06
Payer: COMMERCIAL

## 2023-01-06 DIAGNOSIS — E03.9 ACQUIRED HYPOTHYROIDISM: ICD-10-CM

## 2023-01-06 DIAGNOSIS — E78.5 HYPERLIPIDEMIA, UNSPECIFIED HYPERLIPIDEMIA TYPE: ICD-10-CM

## 2023-01-06 DIAGNOSIS — R20.0 NUMBNESS AND TINGLING OF RIGHT ARM AND LEG: ICD-10-CM

## 2023-01-06 DIAGNOSIS — I10 PRIMARY HYPERTENSION: Primary | ICD-10-CM

## 2023-01-06 DIAGNOSIS — R20.2 NUMBNESS AND TINGLING OF RIGHT ARM AND LEG: ICD-10-CM

## 2023-01-06 PROCEDURE — G8421 BMI NOT CALCULATED: HCPCS | Performed by: FAMILY MEDICINE

## 2023-01-06 PROCEDURE — 1036F TOBACCO NON-USER: CPT | Performed by: FAMILY MEDICINE

## 2023-01-06 PROCEDURE — 3017F COLORECTAL CA SCREEN DOC REV: CPT | Performed by: FAMILY MEDICINE

## 2023-01-06 PROCEDURE — G8427 DOCREV CUR MEDS BY ELIG CLIN: HCPCS | Performed by: FAMILY MEDICINE

## 2023-01-06 PROCEDURE — G8484 FLU IMMUNIZE NO ADMIN: HCPCS | Performed by: FAMILY MEDICINE

## 2023-01-06 PROCEDURE — 99213 OFFICE O/P EST LOW 20 MIN: CPT | Performed by: FAMILY MEDICINE

## 2023-01-06 ASSESSMENT — PATIENT HEALTH QUESTIONNAIRE - PHQ9
2. FEELING DOWN, DEPRESSED OR HOPELESS: 0
8. MOVING OR SPEAKING SO SLOWLY THAT OTHER PEOPLE COULD HAVE NOTICED. OR THE OPPOSITE, BEING SO FIGETY OR RESTLESS THAT YOU HAVE BEEN MOVING AROUND A LOT MORE THAN USUAL: 0
7. TROUBLE CONCENTRATING ON THINGS, SUCH AS READING THE NEWSPAPER OR WATCHING TELEVISION: 0
SUM OF ALL RESPONSES TO PHQ9 QUESTIONS 1 & 2: 0
SUM OF ALL RESPONSES TO PHQ QUESTIONS 1-9: 0
4. FEELING TIRED OR HAVING LITTLE ENERGY: 0
9. THOUGHTS THAT YOU WOULD BE BETTER OFF DEAD, OR OF HURTING YOURSELF: 0
6. FEELING BAD ABOUT YOURSELF - OR THAT YOU ARE A FAILURE OR HAVE LET YOURSELF OR YOUR FAMILY DOWN: 0
1. LITTLE INTEREST OR PLEASURE IN DOING THINGS: 0
5. POOR APPETITE OR OVEREATING: 0
10. IF YOU CHECKED OFF ANY PROBLEMS, HOW DIFFICULT HAVE THESE PROBLEMS MADE IT FOR YOU TO DO YOUR WORK, TAKE CARE OF THINGS AT HOME, OR GET ALONG WITH OTHER PEOPLE: 0
3. TROUBLE FALLING OR STAYING ASLEEP: 0
SUM OF ALL RESPONSES TO PHQ QUESTIONS 1-9: 0

## 2023-01-06 ASSESSMENT — ENCOUNTER SYMPTOMS
HAIR LOSS: 0
SHORTNESS OF BREATH: 0
HOARSE VOICE: 0

## 2023-01-06 NOTE — PROGRESS NOTES
Karrie Hart, a female of 63 y.o.did a virtual visit on 1/6/2023. Patient Active Problem List   Diagnosis    OA (osteoarthritis)    Hypothyroidism    JOSE on CPAP    HTN (hypertension)    PETRA (acute kidney injury) ([facility]    PFO with atrial septal aneurysm    Adult BMI >=70 kg/sq m ([facility]    Rt Hemiparesis following cerebrovascular accident (CVA) ([facility]    Neuropathy    Depression    Venous insufficiency of both lower extremities    FLAVIO (generalized anxiety disorder)    Varicose veins of left leg with edema    Cellulitis of right leg    Iron deficiency anemia    Postmenopausal vaginal bleeding    Complex endometrial hyperplasia with atypia    Lymphedema of both lower extremities    Pneumonia due to COVID-19 virus    Hemiparesis affecting right side as late effect of cerebrovascular accident (CVA) ([facility]    CKD (chronic kidney disease) stage 3, GFR 30-59 ml/min ([facility])    Morbid obesity with BMI of 60.0-69.9, adult ([facility]    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 The problem is controlled. Associated symptoms include peripheral edema (same). Pertinent negatives include no chest pain, headaches, palpitations or shortness of breath. There are no compliance problems. Identifiable causes of hypertension include a thyroid problem. Hyperlipidemia  This is a chronic problem. The current episode started more than 1 year ago. The problem is controlled. Pertinent negatives include no chest pain, leg pain, myalgias or shortness of breath. Current antihyperlipidemic treatment includes statins. There are no compliance problems. Thyroid Problem  Presents for follow-up visit. Patient reports no anxiety, depressed mood, dry skin, hair loss, hoarse voice, nail problem or palpitations. Her past medical history is significant for hyperlipidemia. Leg numbness R: during day. On Neurontin bid. Getting home PT.      Allergies   Allergen Reactions    Pcn [Penicillins] Shortness Of Breath Penicillin G Shortness Of Breath       Current Outpatient Medications on File Prior to Visit   Medication Sig Dispense Refill    triamcinolone (KENALOG) 0.1 % cream APPLY TO AFFECTED AREA(S) TWICE A DAY. 80 g 0    vitamin D (ERGOCALCIFEROL) 1.25 MG (02165 UT) CAPS capsule Take 1 capsule by mouth once a week 12 capsule 1    atorvastatin (LIPITOR) 20 MG tablet Take 1 tablet by mouth daily 90 tablet 0    lisinopril (PRINIVIL;ZESTRIL) 20 MG tablet TAKE ONE TABLET BY MOUTH EVERY DAY 90 tablet 0    citalopram (CELEXA) 40 MG tablet Take 1 tablet by mouth daily 90 tablet 0    baclofen (LIORESAL) 10 MG tablet TAKE ONE TABLET BY MOUTH 3 TIMES A DAY AS NEEDED FOR MUSCLE SPASMS. 90 tablet 0    nystatin (MYCOSTATIN) 937103 UNIT/GM powder Apply 3 times daily. 60 g 0    levothyroxine (SYNTHROID) 150 MCG tablet Take 1 tablet by mouth daily 90 tablet 0    warfarin (COUMADIN) 5 MG tablet TAKE ONE TABLET BY MOUTH DAILY ON SUNDAYS, MONDAYS, WEDNESDAYS AND FRIDAYS. 90 tablet 0    gabapentin (NEURONTIN) 600 MG tablet Take 1 tablet by mouth 2 times daily for 90 days. 180 tablet 0    busPIRone (BUSPAR) 15 MG tablet TAKE 1 TABLET BY MOUTH TWICE DAILY 180 tablet 0    ALLERGY RELIEF 10 MG tablet TAKE 1 TABLET BY MOUTH DAILY 90 tablet 0    nystatin (MYCOSTATIN) 369210 UNIT/GM powder Apply 3 times daily. 60 g 1    diclofenac sodium (VOLTAREN) 1 % GEL Apply 4 g topically 4 times daily 100 g 1    Corn Starch (JOHNSONS BABY CORNSTARCH) POWD Apply in a.m. 392 g 1    Hospital Bed MISC by Does not apply route Semiautomatic 1 each 0    mupirocin (BACTROBAN) 2 % ointment Apply topically 3 times daily. 22 g 2    mineral oil-hydrophilic petrolatum (HYDROPHOR) ointment Apply topically as needed. 228 g 0    Disposable Gloves (LATEX GLOVES MEDIUM) MISC Use as needed.  100 each 0    Elastic Bandages & Supports (MEDICAL COMPRESSION STOCKINGS) MISC 1 each by Does not apply route daily 1 each 0    Magnesium Cl-Calcium Carbonate (SLOW-MAG PO) Take by mouth nightly      Lift Chair MISC by Does not apply route 1 each 0    acetaminophen (TYLENOL ARTHRITIS PAIN) 650 MG extended release tablet Take 1,300 mg by mouth every 8 hours as needed for Pain      CPAP Machine MISC by Does not apply route      levonorgestrel (MIRENA, 52 MG,) IUD 52 mg 1 each by Intrauterine route once      Misc. Devices (GEL-FOAM CUSHION) MISC For chair 1 each 0     No current facility-administered medications on file prior to visit. Review of Systems   HENT:  Negative for hoarse voice. Respiratory:  Negative for shortness of breath. Cardiovascular:  Negative for chest pain and palpitations. Musculoskeletal:  Negative for myalgias. Neurological:  Negative for headaches. Psychiatric/Behavioral:  The patient is not nervous/anxious. other review of systems reviewed and are negative    OBJECTIVE:  There were no vitals taken for this visit. Physical Exam  Constitutional:       General: She is not in acute distress. Appearance: Normal appearance. She is not ill-appearing, toxic-appearing or diaphoretic. HENT:      Head: Normocephalic. Eyes:      General: No scleral icterus. Pulmonary:      Effort: Pulmonary effort is normal.   Neurological:      Mental Status: She is alert and oriented to person, place, and time. Psychiatric:         Mood and Affect: Mood normal.     - home vital signs reviewed. ASSESSMENT AND PLAN:    Ruthy Krishnamurthy was seen today for hypertension. Diagnoses and all orders for this visit:    Primary hypertension    Hyperlipidemia, unspecified hyperlipidemia type    Numbness and tingling of right arm and leg    Acquired hypothyroidism    - continue current meds but change Neurontin am dose to 1/2 am and in afternoon ands continue hs current dose. Call if no change. - encourage diet and exercise for wt loss. Return in about 4 months (around 5/6/2023).     Ba , DO    TeleMedicine Patient Consent    This visit was performed as a virtual video visit using a synchronous, two-way, audio-video telehealth technology platform. Patient identification was verified at the start of the visit, including the patient's telephone number and physical location. I discussed with the patient the nature of our telehealth visits, that:     Due to the nature of an audio- video modality, the only components of a physical exam that could be done are the elements supported by direct observation.  I would evaluate the patient and recommend diagnostics and treatments based on my assessment.  If it was felt that the patient should be evaluated in clinic or an emergency room setting, then they would be directed there.  Our sessions are not being recorded and that personal health information is protected.  Our team would provide follow up care in person if/when the patient needs it.       Patient does agree to proceed with telemedicine consultation.    Patient's location: home address in [place]. Physician  location [place]. other people involved in call none.    Time spent: Not billed by time    This visit was completed virtually using Doxy.nickolas Waldronia STEFFEN Carreno, was evaluated through a synchronous (real-time (A/V encounter.  The patient (or guardian if applicable (is aware that this is a billable service, which includes applicable co-pays.  The virtual visit was conducted with patient's 9 and/or legal guardians (consent.  The visit was conducted pursuant to the emergency declaration under the [place] act and the National emergencies act, 1135 waiver authority and the coronavirus preparedness and response supplemental appropriations act.  Patient identification was verified, and a caregiver was present when appropriate.  The patient was located in a state where the provider was licensed to provide care.

## 2023-01-10 ENCOUNTER — TELEPHONE (OUTPATIENT)
Dept: PRIMARY CARE CLINIC | Age: 63
End: 2023-01-10

## 2023-01-10 ENCOUNTER — ANTI-COAG VISIT (OUTPATIENT)
Dept: PRIMARY CARE CLINIC | Age: 63
End: 2023-01-10

## 2023-01-10 LAB — INR BLD: 1.5

## 2023-01-10 NOTE — PROGRESS NOTES
Increase Coumadin to 5 mg on Mondays, Tuesdays, Thursdays, Fridays, and Saturdays. Continue 2.5 mg on Wednesdays and Sundays. Continue weekly INR checks.

## 2023-01-17 ENCOUNTER — ANTI-COAG VISIT (OUTPATIENT)
Dept: PRIMARY CARE CLINIC | Age: 63
End: 2023-01-17

## 2023-01-17 DIAGNOSIS — I69.359 HEMIPARESIS FOLLOWING CEREBROVASCULAR ACCIDENT (CVA) (HCC): Chronic | ICD-10-CM

## 2023-01-17 LAB — INR BLD: 1.8

## 2023-01-17 RX ORDER — BACLOFEN 10 MG/1
TABLET ORAL
Qty: 90 TABLET | Refills: 0 | Status: SHIPPED | OUTPATIENT
Start: 2023-01-17

## 2023-01-24 ENCOUNTER — ANTI-COAG VISIT (OUTPATIENT)
Dept: PRIMARY CARE CLINIC | Age: 63
End: 2023-01-24

## 2023-01-24 LAB — INR BLD: 1.5

## 2023-01-24 NOTE — PROGRESS NOTES
Patient informed to take 5mg coumadin daily and recheck in 1 week. Patient verbalized understanding.

## 2023-01-24 NOTE — PROGRESS NOTES
Increase Coumadin to 5 mg daily. No longer take only 2.5 on Sundays.   Continue weekly Coumadin checks

## 2023-01-30 DIAGNOSIS — R20.2 NUMBNESS AND TINGLING OF RIGHT ARM AND LEG: ICD-10-CM

## 2023-01-30 DIAGNOSIS — R20.0 NUMBNESS AND TINGLING OF RIGHT ARM AND LEG: ICD-10-CM

## 2023-01-30 RX ORDER — GABAPENTIN 600 MG/1
TABLET ORAL
Qty: 180 TABLET | Refills: 1 | Status: SHIPPED | OUTPATIENT
Start: 2023-01-30 | End: 2023-07-29

## 2023-01-30 RX ORDER — ERGOCALCIFEROL 1.25 MG/1
50000 CAPSULE ORAL WEEKLY
Qty: 12 CAPSULE | Refills: 1 | Status: SHIPPED | OUTPATIENT
Start: 2023-01-30

## 2023-01-30 RX ORDER — LEVOTHYROXINE SODIUM 0.15 MG/1
150 TABLET ORAL DAILY
Qty: 90 TABLET | Refills: 1 | Status: SHIPPED | OUTPATIENT
Start: 2023-01-30

## 2023-02-07 ENCOUNTER — ANTI-COAG VISIT (OUTPATIENT)
Dept: PRIMARY CARE CLINIC | Age: 63
End: 2023-02-07

## 2023-02-07 LAB — INR BLD: 3.8

## 2023-02-14 ENCOUNTER — ANTI-COAG VISIT (OUTPATIENT)
Dept: PRIMARY CARE CLINIC | Age: 63
End: 2023-02-14

## 2023-02-14 DIAGNOSIS — L30.9 DERMATITIS: ICD-10-CM

## 2023-02-14 LAB — INR BLD: 3.6

## 2023-02-14 RX ORDER — TRIAMCINOLONE ACETONIDE 1 MG/G
CREAM TOPICAL
Qty: 80 G | Refills: 0 | Status: SHIPPED | OUTPATIENT
Start: 2023-02-14

## 2023-02-15 DIAGNOSIS — F41.9 ANXIETY: ICD-10-CM

## 2023-02-16 RX ORDER — BUSPIRONE HYDROCHLORIDE 15 MG/1
TABLET ORAL
Qty: 180 TABLET | Refills: 0 | Status: SHIPPED | OUTPATIENT
Start: 2023-02-16

## 2023-02-21 ENCOUNTER — TELEPHONE (OUTPATIENT)
Dept: PRIMARY CARE CLINIC | Age: 63
End: 2023-02-21

## 2023-02-21 RX ORDER — SULFAMETHOXAZOLE AND TRIMETHOPRIM 800; 160 MG/1; MG/1
1 TABLET ORAL 2 TIMES DAILY
Qty: 14 TABLET | Refills: 0 | Status: SHIPPED | OUTPATIENT
Start: 2023-02-21 | End: 2023-02-28

## 2023-02-21 NOTE — TELEPHONE ENCOUNTER
[address] called and wanted to know if an antibiotic can be called in for the patient. Pt has a 13 cm by 8 cm, purple, dark pink wound with drainage. Please call into her [address] if possible.

## 2023-02-21 NOTE — TELEPHONE ENCOUNTER
Antibiotic called in for this skin lesion problem. But I need to do a virtual visit with her to take a look at this. If no improvement go to the emergency room.

## 2023-02-22 SDOH — ECONOMIC STABILITY: TRANSPORTATION INSECURITY
IN THE PAST 12 MONTHS, HAS LACK OF TRANSPORTATION KEPT YOU FROM MEETINGS, WORK, OR FROM GETTING THINGS NEEDED FOR DAILY LIVING?: NO

## 2023-02-22 SDOH — ECONOMIC STABILITY: HOUSING INSECURITY
IN THE LAST 12 MONTHS, WAS THERE A TIME WHEN YOU DID NOT HAVE A STEADY PLACE TO SLEEP OR SLEPT IN A SHELTER (INCLUDING NOW)?: NO

## 2023-02-22 SDOH — ECONOMIC STABILITY: FOOD INSECURITY: WITHIN THE PAST 12 MONTHS, YOU WORRIED THAT YOUR FOOD WOULD RUN OUT BEFORE YOU GOT MONEY TO BUY MORE.: NEVER TRUE

## 2023-02-22 SDOH — ECONOMIC STABILITY: FOOD INSECURITY: WITHIN THE PAST 12 MONTHS, THE FOOD YOU BOUGHT JUST DIDN'T LAST AND YOU DIDN'T HAVE MONEY TO GET MORE.: NEVER TRUE

## 2023-02-22 SDOH — ECONOMIC STABILITY: INCOME INSECURITY: HOW HARD IS IT FOR YOU TO PAY FOR THE VERY BASICS LIKE FOOD, HOUSING, MEDICAL CARE, AND HEATING?: NOT HARD AT ALL

## 2023-02-23 ENCOUNTER — TELEMEDICINE (OUTPATIENT)
Dept: PRIMARY CARE CLINIC | Age: 63
End: 2023-02-23
Payer: COMMERCIAL

## 2023-02-23 DIAGNOSIS — L03.115 CELLULITIS OF RIGHT ANTERIOR LOWER LEG: Primary | ICD-10-CM

## 2023-02-23 PROCEDURE — G8421 BMI NOT CALCULATED: HCPCS | Performed by: FAMILY MEDICINE

## 2023-02-23 PROCEDURE — 1036F TOBACCO NON-USER: CPT | Performed by: FAMILY MEDICINE

## 2023-02-23 PROCEDURE — 99213 OFFICE O/P EST LOW 20 MIN: CPT | Performed by: FAMILY MEDICINE

## 2023-02-23 PROCEDURE — 3017F COLORECTAL CA SCREEN DOC REV: CPT | Performed by: FAMILY MEDICINE

## 2023-02-23 PROCEDURE — G8484 FLU IMMUNIZE NO ADMIN: HCPCS | Performed by: FAMILY MEDICINE

## 2023-02-23 PROCEDURE — G8427 DOCREV CUR MEDS BY ELIG CLIN: HCPCS | Performed by: FAMILY MEDICINE

## 2023-02-23 NOTE — PROGRESS NOTES
Keagan Joaquin, a female of 63 y.o.did a virtual visit on 2/23/2023. Patient Active Problem List   Diagnosis    OA (osteoarthritis)    Hypothyroidism    JOSE on CPAP    HTN (hypertension)    PETRA (acute kidney injury) ([facility]    PFO with atrial septal aneurysm    Adult BMI >=70 kg/sq m ([facility]    Rt Hemiparesis following cerebrovascular accident (CVA) ([facility]    Neuropathy    Depression    Venous insufficiency of both lower extremities    FLAVIO (generalized anxiety disorder)    Varicose veins of left leg with edema    Cellulitis of right leg    Iron deficiency anemia    Postmenopausal vaginal bleeding    Complex endometrial hyperplasia with atypia    Lymphedema of both lower extremities    Pneumonia due to COVID-19 virus    Hemiparesis affecting right side as late effect of cerebrovascular accident (CVA) ([facility]    CKD (chronic kidney disease) stage 3, GFR 30-59 ml/min ([facility])    Morbid obesity with BMI of 60.0-69.9, adult ([facility]    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 There has been clear discharge from the wound. Redness Status: yes. There is no swelling present. There is no pain present. - her dog licked her R shin 8/28 and her skin broke open. Started on Bactrim yesterday and it's looking better. Getting dressing to wound. Getting wound treatment help from [facility],[address] [place] [address]. Allergies   Allergen Reactions    Penicillin G Shortness Of Breath       Current Outpatient Medications on File Prior to Visit   Medication Sig Dispense Refill    sulfamethoxazole-trimethoprim (BACTRIM DS;SEPTRA DS) 800-160 MG per tablet Take 1 tablet by mouth 2 times daily for 7 days 14 tablet 0    busPIRone (BUSPAR) 15 MG tablet TAKE 1 TABLET BY MOUTH TWICE DAILY 180 tablet 0    triamcinolone (KENALOG) 0.1 % cream APPLY TO AFFECTED AREA(S) TWICE A DAY.  80 g 0    levothyroxine (SYNTHROID) 150 MCG tablet Take 1 tablet by mouth daily 90 tablet 1    vitamin D (ERGOCALCIFEROL) 1.25 MG (94200 UT) CAPS capsule Take 1 capsule by mouth once a week 12 capsule 1    gabapentin (NEURONTIN) 600 MG tablet TAKE ONE TABLET BY MOUTH 2 TIMES A  tablet 1    baclofen (LIORESAL) 10 MG tablet TAKE ONE TABLET BY MOUTH 3 TIMES A DAY AS NEEDED FOR MUSCLE SPASMS. 90 tablet 0    atorvastatin (LIPITOR) 20 MG tablet Take 1 tablet by mouth daily 90 tablet 0    lisinopril (PRINIVIL;ZESTRIL) 20 MG tablet TAKE ONE TABLET BY MOUTH EVERY DAY 90 tablet 0    citalopram (CELEXA) 40 MG tablet Take 1 tablet by mouth daily 90 tablet 0    nystatin (MYCOSTATIN) 065947 UNIT/GM powder Apply 3 times daily. 60 g 0    warfarin (COUMADIN) 5 MG tablet TAKE ONE TABLET BY MOUTH DAILY ON SUNDAYS, MONDAYS, WEDNESDAYS AND FRIDAYS. 90 tablet 0    ALLERGY RELIEF 10 MG tablet TAKE 1 TABLET BY MOUTH DAILY 90 tablet 0    nystatin (MYCOSTATIN) 680876 UNIT/GM powder Apply 3 times daily. 60 g 1    diclofenac sodium (VOLTAREN) 1 % GEL Apply 4 g topically 4 times daily 100 g 1    Corn Starch (JOHNSONS BABY CORNSTARCH) POWD Apply in a.m. 392 g 1    Hospital Bed MISC by Does not apply route Semiautomatic 1 each 0    mupirocin (BACTROBAN) 2 % ointment Apply topically 3 times daily. 22 g 2    mineral oil-hydrophilic petrolatum (HYDROPHOR) ointment Apply topically as needed. 228 g 0    Disposable Gloves (LATEX GLOVES MEDIUM) MISC Use as needed. 100 each 0    Elastic Bandages & Supports (MEDICAL COMPRESSION STOCKINGS) MISC 1 each by Does not apply route daily 1 each 0    Magnesium Cl-Calcium Carbonate (SLOW-MAG PO) Take by mouth nightly      Lift Chair MISC by Does not apply route 1 each 0    acetaminophen (TYLENOL ARTHRITIS PAIN) 650 MG extended release tablet Take 1,300 mg by mouth every 8 hours as needed for Pain      CPAP Machine MISC by Does not apply route      levonorgestrel (MIRENA, 52 MG,) IUD 52 mg 1 each by Intrauterine route once      Misc. Devices (GEL-FOAM CUSHION) MISC For chair 1 each 0     No current facility-administered medications on file  prior to visit. Review of Systems   Constitutional:  Negative for chills and fever. other review of systems reviewed and are negative    OBJECTIVE:  There were no vitals taken for this visit. Physical Exam  Constitutional:       General: She is not in acute distress. Appearance: Normal appearance. She is not ill-appearing, toxic-appearing or diaphoretic. HENT:      Head: Normocephalic. Eyes:      General: No scleral icterus. Pulmonary:      Effort: Pulmonary effort is normal.   Skin:     Findings: Erythema (and violaceous 13  x 9 cm superficial wound with scaling of tissue over R distal LE anteriorly) present. Neurological:      Mental Status: She is alert and oriented to person, place, and time. Psychiatric:         Mood and Affect: Mood normal.       ASSESSMENT AND PLAN:    Denise Anderson was seen today for wound infection. Diagnoses and all orders for this visit:    Cellulitis of right anterior lower leg  -Continue dressing changes to right leg wound.  -Continue Bactrim DS for 1 week. Call if lesion or will not healed. Return if symptoms worsen or fail to improve, for as scheduled, or for acute problem. Bob Calero DO    TeleMedicine Patient Consent    This visit was performed as a virtual video visit using a synchronous, two-way, audio-video telehealth technology platform. Patient identification was verified at the start of the visit, including the patient's telephone number and physical location. I discussed with the patient the nature of our telehealth visits, that:     Due to the nature of an audio- video modality, the only components of a physical exam that could be done are the elements supported by direct observation. I would evaluate the patient and recommend diagnostics and treatments based on my assessment. If it was felt that the patient should be evaluated in clinic or an emergency room setting, then they would be directed there.   Our sessions are not being recorded and that personal health information is protected.  Our team would provide follow up care in person if/when the patient needs it.       Patient does agree to proceed with telemedicine consultation.    Patient's location: home address in [place]. Physician  location [place]. other people involved in call none.    Time spent: Not billed by time    This visit was completed virtually using Doxy.nickolas Waldronia STEFFEN Carreno, was evaluated through a synchronous (real-time (A/V encounter.  The patient (or guardian if applicable (is aware that this is a billable service, which includes applicable co-pays.  The virtual visit was conducted with patient's 9 and/or legal guardians (consent.  The visit was conducted pursuant to the emergency declaration under the [place] act and the National emergencies act, 1135 waiver authority and the coronavirus preparedness and response supplemental appropriations act.  Patient identification was verified, and a caregiver was present when appropriate.  The patient was located in a state where the provider was licensed to provide care.

## 2023-02-27 ENCOUNTER — TELEPHONE (OUTPATIENT)
Dept: PRIMARY CARE CLINIC | Age: 63
End: 2023-02-27

## 2023-02-27 NOTE — TELEPHONE ENCOUNTER
Patient stopped taking the Bactrim. Last dose was yesterday morning. She said she did not take it last night because she felt jumpy and jittery. Not sure if the Bactrim is what caused that feeling. She will know later today if her leg has improved when home health comes in. Please advise, thanks.

## 2023-02-27 NOTE — TELEPHONE ENCOUNTER
I am not sure if it is the Bactrim but I would like her to try it again and see if she tolerates it. If she does have problems again then let me know.

## 2023-02-28 ENCOUNTER — ANTI-COAG VISIT (OUTPATIENT)
Dept: PRIMARY CARE CLINIC | Age: 63
End: 2023-02-28

## 2023-02-28 ENCOUNTER — TELEPHONE (OUTPATIENT)
Dept: PRIMARY CARE CLINIC | Age: 63
End: 2023-02-28

## 2023-02-28 LAB — INR BLD: 3.2

## 2023-02-28 NOTE — TELEPHONE ENCOUNTER
Stop Bactrim. Let me know how her leg wound is doing. If we have to start another oral antibiotic we can if it is not improving. Continue current wound care.

## 2023-02-28 NOTE — TELEPHONE ENCOUNTER
Patient notified, states the nurse says it has gotten better. She understands to keep an eye on it and call us immediately if she notices the wound worsening.

## 2023-02-28 NOTE — TELEPHONE ENCOUNTER
Per your advice, patient took a dose of Bactrim last night and called this morning to report that she experienced the same reaction she did previously.

## 2023-03-05 DIAGNOSIS — L30.9 DERMATITIS: ICD-10-CM

## 2023-03-05 DIAGNOSIS — I69.359 HEMIPARESIS FOLLOWING CEREBROVASCULAR ACCIDENT (CVA) (HCC): Chronic | ICD-10-CM

## 2023-03-06 RX ORDER — TRIAMCINOLONE ACETONIDE 1 MG/G
CREAM TOPICAL
Qty: 80 G | Refills: 0 | Status: SHIPPED | OUTPATIENT
Start: 2023-03-06

## 2023-03-06 RX ORDER — BACLOFEN 10 MG/1
10 TABLET ORAL 3 TIMES DAILY PRN
Qty: 90 TABLET | Refills: 1 | Status: SHIPPED | OUTPATIENT
Start: 2023-03-06

## 2023-03-07 ENCOUNTER — ANTI-COAG VISIT (OUTPATIENT)
Dept: PRIMARY CARE CLINIC | Age: 63
End: 2023-03-07

## 2023-03-07 LAB — INR BLD: 3.5

## 2023-03-14 ENCOUNTER — ANTI-COAG VISIT (OUTPATIENT)
Dept: PRIMARY CARE CLINIC | Age: 63
End: 2023-03-14

## 2023-03-14 LAB — INR BLD: 3.6

## 2023-03-17 ENCOUNTER — PATIENT MESSAGE (OUTPATIENT)
Dept: PRIMARY CARE CLINIC | Age: 63
End: 2023-03-17

## 2023-03-18 DIAGNOSIS — I69.359 HEMIPARESIS FOLLOWING CEREBROVASCULAR ACCIDENT (CVA) (HCC): Chronic | ICD-10-CM

## 2023-03-20 RX ORDER — SULFAMETHOXAZOLE AND TRIMETHOPRIM 800; 160 MG/1; MG/1
1 TABLET ORAL 2 TIMES DAILY
Qty: 10 TABLET | Refills: 0 | Status: SHIPPED | OUTPATIENT
Start: 2023-03-20 | End: 2023-03-25

## 2023-03-20 RX ORDER — WARFARIN SODIUM 5 MG/1
TABLET ORAL
Qty: 90 TABLET | Refills: 1 | Status: CANCELLED | OUTPATIENT
Start: 2023-03-20

## 2023-03-20 RX ORDER — WARFARIN SODIUM 5 MG/1
TABLET ORAL
Qty: 90 TABLET | Refills: 0 | OUTPATIENT
Start: 2023-03-20

## 2023-03-21 ENCOUNTER — ANTI-COAG VISIT (OUTPATIENT)
Dept: PRIMARY CARE CLINIC | Age: 63
End: 2023-03-21

## 2023-03-21 LAB — INR BLD: 3.9

## 2023-03-22 ENCOUNTER — TELEPHONE (OUTPATIENT)
Dept: PRIMARY CARE CLINIC | Age: 63
End: 2023-03-22

## 2023-03-22 DIAGNOSIS — N30.00 ACUTE CYSTITIS WITHOUT HEMATURIA: Primary | ICD-10-CM

## 2023-03-22 RX ORDER — NITROFURANTOIN 25; 75 MG/1; MG/1
100 CAPSULE ORAL 2 TIMES DAILY
Qty: 14 CAPSULE | Refills: 0 | Status: SHIPPED | OUTPATIENT
Start: 2023-03-22 | End: 2023-03-29

## 2023-03-22 NOTE — PROGRESS NOTES
I sent a response to [facility] regarding this. We are not going to change her Coumadin dose but recheck her INR in 1 week.

## 2023-03-22 NOTE — TELEPHONE ENCOUNTER
Patient recently prescribed Bactrim for UTI and she is unable to tolerate. It makes her feel jumpy and nervous. She is asking for you to send in a different antibiotic to [facility].

## 2023-03-22 NOTE — TELEPHONE ENCOUNTER
We will change Bactrim to Macrobid 100 mg 1 twice a day for 1 week for her urinary tract infection. Continue current Coumadin dose but recheck INR 1 week.

## 2023-03-24 ENCOUNTER — TELEPHONE (OUTPATIENT)
Dept: PRIMARY CARE CLINIC | Age: 63
End: 2023-03-24

## 2023-03-24 RX ORDER — BENZALKONIUM CHLORIDE 1.3 MG/ML
CLOTH TOPICAL
Qty: 1 EACH | Refills: 0 | Status: SHIPPED | OUTPATIENT
Start: 2023-03-24

## 2023-03-24 NOTE — TELEPHONE ENCOUNTER
Pt called and states when she received her CPAP machine the main hose was missing, needs a new order sent to fax #[phone]. Please advise.

## 2023-03-27 ENCOUNTER — TELEPHONE (OUTPATIENT)
Dept: PRIMARY CARE CLINIC | Age: 63
End: 2023-03-27

## 2023-03-27 DIAGNOSIS — Z99.89 CPAP (CONTINUOUS POSITIVE AIRWAY PRESSURE) DEPENDENCE: Primary | ICD-10-CM

## 2023-03-28 ENCOUNTER — ANTI-COAG VISIT (OUTPATIENT)
Dept: PRIMARY CARE CLINIC | Age: 63
End: 2023-03-28

## 2023-03-28 ENCOUNTER — TELEPHONE (OUTPATIENT)
Dept: PRIMARY CARE CLINIC | Age: 63
End: 2023-03-28

## 2023-03-28 LAB — INR BLD: 3.8

## 2023-03-28 NOTE — PROGRESS NOTES
Decrease her Coumadin dose by 2.5 mg on Monday. So now she will take 2.5 mg Monday, Wednesdays, Fridays and Sundays.   5 mg the other 3 days of the week

## 2023-04-03 DIAGNOSIS — E78.5 HYPERLIPIDEMIA, UNSPECIFIED HYPERLIPIDEMIA TYPE: ICD-10-CM

## 2023-04-03 DIAGNOSIS — I10 PRIMARY HYPERTENSION: ICD-10-CM

## 2023-04-03 DIAGNOSIS — F41.9 ANXIETY: ICD-10-CM

## 2023-04-03 RX ORDER — LISINOPRIL 20 MG/1
TABLET ORAL
Qty: 90 TABLET | Refills: 0 | Status: SHIPPED | OUTPATIENT
Start: 2023-04-03

## 2023-04-03 RX ORDER — ATORVASTATIN CALCIUM 20 MG/1
TABLET, FILM COATED ORAL
Qty: 90 TABLET | Refills: 0 | Status: SHIPPED | OUTPATIENT
Start: 2023-04-03

## 2023-04-03 RX ORDER — CITALOPRAM 40 MG/1
TABLET ORAL
Qty: 90 TABLET | Refills: 0 | Status: SHIPPED | OUTPATIENT
Start: 2023-04-03

## 2023-04-17 DIAGNOSIS — I69.359 HEMIPARESIS FOLLOWING CEREBROVASCULAR ACCIDENT (CVA) (HCC): Chronic | ICD-10-CM

## 2023-04-17 RX ORDER — BACLOFEN 10 MG/1
10 TABLET ORAL 3 TIMES DAILY PRN
Qty: 90 TABLET | Refills: 0 | Status: SHIPPED | OUTPATIENT
Start: 2023-04-17

## 2023-04-24 DIAGNOSIS — F41.9 ANXIETY: ICD-10-CM

## 2023-04-24 RX ORDER — LEVOTHYROXINE SODIUM 0.15 MG/1
150 TABLET ORAL DAILY
Qty: 90 TABLET | Refills: 0 | Status: SHIPPED | OUTPATIENT
Start: 2023-04-24

## 2023-04-24 RX ORDER — BUSPIRONE HYDROCHLORIDE 15 MG/1
TABLET ORAL
Qty: 180 TABLET | Refills: 0 | Status: SHIPPED | OUTPATIENT
Start: 2023-04-24

## 2023-05-08 ENCOUNTER — TELEMEDICINE (OUTPATIENT)
Dept: PRIMARY CARE CLINIC | Age: 63
End: 2023-05-08
Payer: COMMERCIAL

## 2023-05-08 DIAGNOSIS — E78.5 HYPERLIPIDEMIA, UNSPECIFIED HYPERLIPIDEMIA TYPE: ICD-10-CM

## 2023-05-08 DIAGNOSIS — I10 PRIMARY HYPERTENSION: Primary | ICD-10-CM

## 2023-05-08 DIAGNOSIS — E03.9 ACQUIRED HYPOTHYROIDISM: ICD-10-CM

## 2023-05-08 DIAGNOSIS — N18.31 STAGE 3A CHRONIC KIDNEY DISEASE (HCC): ICD-10-CM

## 2023-05-08 DIAGNOSIS — N39.41 URGE INCONTINENCE: ICD-10-CM

## 2023-05-08 PROCEDURE — 99214 OFFICE O/P EST MOD 30 MIN: CPT | Performed by: FAMILY MEDICINE

## 2023-05-08 PROCEDURE — 1036F TOBACCO NON-USER: CPT | Performed by: FAMILY MEDICINE

## 2023-05-08 PROCEDURE — 3017F COLORECTAL CA SCREEN DOC REV: CPT | Performed by: FAMILY MEDICINE

## 2023-05-08 PROCEDURE — G8421 BMI NOT CALCULATED: HCPCS | Performed by: FAMILY MEDICINE

## 2023-05-08 PROCEDURE — G8427 DOCREV CUR MEDS BY ELIG CLIN: HCPCS | Performed by: FAMILY MEDICINE

## 2023-05-08 RX ORDER — SOLIFENACIN SUCCINATE 5 MG/1
5 TABLET, FILM COATED ORAL DAILY
Qty: 90 TABLET | Refills: 1 | Status: SHIPPED | OUTPATIENT
Start: 2023-05-08

## 2023-05-08 ASSESSMENT — ENCOUNTER SYMPTOMS
SHORTNESS OF BREATH: 0
HAIR LOSS: 0

## 2023-05-08 NOTE — PROGRESS NOTES
Taryn Shin, was evaluated through a synchronous (real-time (A/V encounter. The patient (or guardian if applicable (is aware that this is a billable service, which includes applicable co-pays. The virtual visit was conducted with patient's 9 and/or legal guardians (consent. The visit was conducted pursuant to the emergency declaration under the [place] act and the [facility], [phone] waiver authority and the coronavirus preparedness and response supplemental appropriations act. Patient identification was verified, and a caregiver was present when appropriate. The patient was located in a state where the provider was licensed to provide care.

## 2023-05-17 ENCOUNTER — TELEPHONE (OUTPATIENT)
Dept: PRIMARY CARE CLINIC | Age: 63
End: 2023-05-17

## 2023-05-17 DIAGNOSIS — R20.0 NUMBNESS AND TINGLING OF RIGHT ARM AND LEG: ICD-10-CM

## 2023-05-17 DIAGNOSIS — R20.2 NUMBNESS AND TINGLING OF RIGHT ARM AND LEG: ICD-10-CM

## 2023-05-17 RX ORDER — GABAPENTIN 600 MG/1
TABLET ORAL
Qty: 225 TABLET | Refills: 1 | Status: SHIPPED | OUTPATIENT
Start: 2023-05-17 | End: 2023-11-16

## 2023-05-17 NOTE — TELEPHONE ENCOUNTER
[facility].  Needs changed to 1.5 pills in the AM and one pill in the PM

## 2023-05-30 ENCOUNTER — ANTI-COAG VISIT (OUTPATIENT)
Dept: PRIMARY CARE CLINIC | Age: 63
End: 2023-05-30

## 2023-05-30 LAB — INR BLD: 1.7

## 2023-07-01 DIAGNOSIS — E78.5 HYPERLIPIDEMIA, UNSPECIFIED HYPERLIPIDEMIA TYPE: ICD-10-CM

## 2023-07-01 DIAGNOSIS — I10 PRIMARY HYPERTENSION: ICD-10-CM

## 2023-07-01 DIAGNOSIS — F41.9 ANXIETY: ICD-10-CM

## 2023-07-03 RX ORDER — ATORVASTATIN CALCIUM 20 MG/1
TABLET, FILM COATED ORAL
Qty: 90 TABLET | Refills: 0 | Status: SHIPPED | OUTPATIENT
Start: 2023-07-03

## 2023-07-03 RX ORDER — CITALOPRAM 40 MG/1
TABLET ORAL
Qty: 90 TABLET | Refills: 0 | Status: SHIPPED | OUTPATIENT
Start: 2023-07-03

## 2023-07-03 RX ORDER — LISINOPRIL 20 MG/1
TABLET ORAL
Qty: 90 TABLET | Refills: 0 | Status: SHIPPED | OUTPATIENT
Start: 2023-07-03

## 2023-07-15 DIAGNOSIS — I69.359 HEMIPARESIS FOLLOWING CEREBROVASCULAR ACCIDENT (CVA) (HCC): Chronic | ICD-10-CM

## 2023-07-17 RX ORDER — ERGOCALCIFEROL 1.25 MG/1
50000 CAPSULE ORAL WEEKLY
Qty: 12 CAPSULE | Refills: 0 | Status: SHIPPED | OUTPATIENT
Start: 2023-07-17

## 2023-07-17 RX ORDER — BACLOFEN 10 MG/1
10 TABLET ORAL 3 TIMES DAILY PRN
Qty: 90 TABLET | Refills: 0 | Status: SHIPPED | OUTPATIENT
Start: 2023-07-17

## 2023-07-18 DIAGNOSIS — L30.9 DERMATITIS: ICD-10-CM

## 2023-07-19 DIAGNOSIS — R20.0 NUMBNESS AND TINGLING OF RIGHT ARM AND LEG: ICD-10-CM

## 2023-07-19 DIAGNOSIS — L30.9 DERMATITIS: ICD-10-CM

## 2023-07-19 DIAGNOSIS — R20.2 NUMBNESS AND TINGLING OF RIGHT ARM AND LEG: ICD-10-CM

## 2023-07-19 RX ORDER — GABAPENTIN 600 MG/1
TABLET ORAL
Qty: 225 TABLET | Refills: 1 | Status: SHIPPED | OUTPATIENT
Start: 2023-07-19 | End: 2024-01-18

## 2023-07-19 RX ORDER — TRIAMCINOLONE ACETONIDE 1 MG/G
CREAM TOPICAL
Qty: 80 G | Refills: 0 | Status: SHIPPED | OUTPATIENT
Start: 2023-07-19

## 2023-07-19 RX ORDER — TRIAMCINOLONE ACETONIDE 1 MG/G
CREAM TOPICAL
Qty: 80 G | Refills: 0 | Status: SHIPPED
Start: 2023-07-19 | End: 2023-07-19 | Stop reason: SDUPTHER

## 2023-07-31 ENCOUNTER — TELEPHONE (OUTPATIENT)
Dept: PRIMARY CARE CLINIC | Age: 63
End: 2023-07-31

## 2023-07-31 DIAGNOSIS — N39.41 URGE INCONTINENCE: ICD-10-CM

## 2023-07-31 RX ORDER — SOLIFENACIN SUCCINATE 5 MG/1
5 TABLET, FILM COATED ORAL DAILY
Qty: 90 TABLET | Refills: 0 | Status: SHIPPED | OUTPATIENT
Start: 2023-07-31

## 2023-07-31 NOTE — TELEPHONE ENCOUNTER
Pt called today, said she will be moving soon to a residence where her mailbox will be across the street. She does not want to have to cross the read in her wheelchair, so is asking for a letter of medical necessity to the [address] [place] [address] stating that her mail has to be delivered to her door. Pls mail letter to her current residence on [address]. New address listed under Temporary in demographics.

## 2023-08-22 DIAGNOSIS — F41.9 ANXIETY: ICD-10-CM

## 2023-08-22 DIAGNOSIS — L30.9 DERMATITIS: ICD-10-CM

## 2023-08-22 RX ORDER — BUSPIRONE HYDROCHLORIDE 15 MG/1
TABLET ORAL
Qty: 180 TABLET | Refills: 0 | OUTPATIENT
Start: 2023-08-22

## 2023-08-22 RX ORDER — BUSPIRONE HYDROCHLORIDE 15 MG/1
15 TABLET ORAL 2 TIMES DAILY
Qty: 180 TABLET | Refills: 0 | Status: SHIPPED | OUTPATIENT
Start: 2023-08-22

## 2023-08-22 RX ORDER — TRIAMCINOLONE ACETONIDE 1 MG/G
CREAM TOPICAL
Qty: 80 G | Refills: 0 | Status: SHIPPED | OUTPATIENT
Start: 2023-08-22

## 2023-09-04 DIAGNOSIS — I69.359 HEMIPARESIS FOLLOWING CEREBROVASCULAR ACCIDENT (CVA) (HCC): Chronic | ICD-10-CM

## 2023-09-05 ENCOUNTER — TELEMEDICINE (OUTPATIENT)
Dept: PRIMARY CARE CLINIC | Age: 63
End: 2023-09-05
Payer: COMMERCIAL

## 2023-09-05 DIAGNOSIS — M25.512 LEFT SHOULDER PAIN, UNSPECIFIED CHRONICITY: ICD-10-CM

## 2023-09-05 DIAGNOSIS — I10 PRIMARY HYPERTENSION: Primary | ICD-10-CM

## 2023-09-05 DIAGNOSIS — I69.359 HEMIPARESIS FOLLOWING CEREBROVASCULAR ACCIDENT (CVA) (HCC): Chronic | ICD-10-CM

## 2023-09-05 DIAGNOSIS — I69.359 HEMIPARESIS FOLLOWING CEREBROVASCULAR ACCIDENT (CVA) (HCC): ICD-10-CM

## 2023-09-05 PROCEDURE — 3017F COLORECTAL CA SCREEN DOC REV: CPT | Performed by: FAMILY MEDICINE

## 2023-09-05 PROCEDURE — 1036F TOBACCO NON-USER: CPT | Performed by: FAMILY MEDICINE

## 2023-09-05 PROCEDURE — G8427 DOCREV CUR MEDS BY ELIG CLIN: HCPCS | Performed by: FAMILY MEDICINE

## 2023-09-05 PROCEDURE — G8421 BMI NOT CALCULATED: HCPCS | Performed by: FAMILY MEDICINE

## 2023-09-05 PROCEDURE — 99213 OFFICE O/P EST LOW 20 MIN: CPT | Performed by: FAMILY MEDICINE

## 2023-09-05 RX ORDER — BACLOFEN 10 MG/1
10 TABLET ORAL 3 TIMES DAILY PRN
Qty: 90 TABLET | Refills: 1 | OUTPATIENT
Start: 2023-09-05

## 2023-09-05 RX ORDER — BACLOFEN 10 MG/1
10 TABLET ORAL 3 TIMES DAILY PRN
Qty: 90 TABLET | Refills: 0 | OUTPATIENT
Start: 2023-09-05

## 2023-09-05 NOTE — PROGRESS NOTES
Devices MISC Cpap humidifier hose  Grant Disla DO   Respiratory Therapy Supplies (CARETOUCH CPAP & BIPAP HOSE) MISC 1 humidified CPAP hose needed  Bryan Disla DO   nystatin (MYCOSTATIN) 710443 UNIT/GM powder Apply 3 times daily. Bryan Disla DO   [place] Starch (JOHNSONS BABY CORNSTARCH) POWD Apply in a.m. [facility] Bed MISC by Does not apply route Semiautomatic  Bryan Disla DO   mupirocin (BACTROBAN) 2 % ointment Apply topically 3 times daily. Bryan Disla DO   mineral oil-hydrophilic petrolatum (HYDROPHOR) ointment Apply topically as needed. Bryan Disla DO   Disposable Gloves (LATEX GLOVES MEDIUM) MISC Use as needed. Grant Disla DO   Elastic Bandages & Supports (MEDICAL COMPRESSION STOCKINGS) MISC 1 each by Does not apply route daily  Bryan Disla DO   Magnesium Cl-Calcium Carbonate (SLOW-MAG PO) Take by mouth nightly  Historical Provider, MD   Lift Chair MISC by Does not apply route  Bryan Disla DO   acetaminophen (TYLENOL ARTHRITIS PAIN) 650 MG extended release tablet Take 1,300 mg by mouth every 8 hours as needed for Pain  Historical Provider, MD   CPAP Machine MISC by Does not apply route  Historical Provider, MD   levonorgestrel (MIRENA, 52 MG,) IUD 52 mg 1 each by Intrauterine route once  Historical Provider, MD   Misc. Devices (GEL-FOAM CUSHION) MISC For chair  Carlos Ceron,        Social History     Tobacco Use    Smoking status: Never    Smokeless tobacco: Never   Vaping Use    Vaping Use: Never used   Substance Use Topics    Alcohol use: No    Drug use:  No            PHYSICAL EXAMINATION:  [ INSTRUCTIONS:  \"[x]\" Indicates a positive item  \"[]\" Indicates a negative item  -- DELETE ALL ITEMS NOT EXAMINED]  Vital Signs: (As obtained by patient/caregiver or practitioner observation)    Blood pressure-  Heart rate-    Respiratory rate-    Temperature-  Pulse

## 2023-09-12 ENCOUNTER — ANTI-COAG VISIT (OUTPATIENT)
Dept: PRIMARY CARE CLINIC | Age: 63
End: 2023-09-12

## 2023-09-12 LAB — INR BLD: 1.8

## 2023-10-02 DIAGNOSIS — F41.9 ANXIETY: ICD-10-CM

## 2023-10-02 RX ORDER — CITALOPRAM 40 MG/1
40 TABLET ORAL DAILY
Qty: 90 TABLET | Refills: 1 | Status: SHIPPED
Start: 2023-10-02 | End: 2023-10-03 | Stop reason: SDUPTHER

## 2023-10-02 RX ORDER — ERGOCALCIFEROL 1.25 MG/1
50000 CAPSULE ORAL WEEKLY
Qty: 12 CAPSULE | Refills: 1 | Status: SHIPPED
Start: 2023-10-02 | End: 2023-10-03 | Stop reason: SDUPTHER

## 2023-10-03 DIAGNOSIS — L30.9 DERMATITIS: ICD-10-CM

## 2023-10-03 DIAGNOSIS — I69.359 HEMIPARESIS FOLLOWING CEREBROVASCULAR ACCIDENT (CVA) (HCC): Chronic | ICD-10-CM

## 2023-10-03 DIAGNOSIS — F41.9 ANXIETY: ICD-10-CM

## 2023-10-04 RX ORDER — WARFARIN SODIUM 5 MG/1
TABLET ORAL
Qty: 90 TABLET | Refills: 0 | Status: SHIPPED | OUTPATIENT
Start: 2023-10-04

## 2023-10-04 RX ORDER — ERGOCALCIFEROL 1.25 MG/1
50000 CAPSULE ORAL WEEKLY
Qty: 12 CAPSULE | Refills: 1 | Status: SHIPPED | OUTPATIENT
Start: 2023-10-04

## 2023-10-04 RX ORDER — CITALOPRAM 40 MG/1
40 TABLET ORAL DAILY
Qty: 90 TABLET | Refills: 0 | Status: SHIPPED | OUTPATIENT
Start: 2023-10-04

## 2023-10-04 RX ORDER — TRIAMCINOLONE ACETONIDE 1 MG/G
CREAM TOPICAL
Qty: 80 G | Refills: 0 | Status: SHIPPED | OUTPATIENT
Start: 2023-10-04

## 2023-10-17 ENCOUNTER — TELEPHONE (OUTPATIENT)
Dept: PRIMARY CARE CLINIC | Age: 63
End: 2023-10-17

## 2023-10-17 NOTE — TELEPHONE ENCOUNTER
Pt called and needs a medical letter stating why she needs a home health aid 7 days a week. Will fax to [facility] when received at #[phone].

## 2023-10-23 DIAGNOSIS — I10 PRIMARY HYPERTENSION: ICD-10-CM

## 2023-10-23 DIAGNOSIS — F41.9 ANXIETY: ICD-10-CM

## 2023-10-23 DIAGNOSIS — L30.9 DERMATITIS: ICD-10-CM

## 2023-10-23 DIAGNOSIS — N39.41 URGE INCONTINENCE: ICD-10-CM

## 2023-10-23 RX ORDER — CITALOPRAM 40 MG/1
40 TABLET ORAL DAILY
Qty: 90 TABLET | Refills: 1 | Status: SHIPPED | OUTPATIENT
Start: 2023-10-23

## 2023-10-23 RX ORDER — LISINOPRIL 20 MG/1
20 TABLET ORAL DAILY
Qty: 90 TABLET | Refills: 1 | Status: SHIPPED | OUTPATIENT
Start: 2023-10-23

## 2023-10-23 RX ORDER — SOLIFENACIN SUCCINATE 5 MG/1
5 TABLET, FILM COATED ORAL DAILY
Qty: 90 TABLET | Refills: 1 | Status: SHIPPED | OUTPATIENT
Start: 2023-10-23

## 2023-10-23 RX ORDER — TRIAMCINOLONE ACETONIDE 1 MG/G
CREAM TOPICAL
Qty: 80 G | Refills: 1 | Status: SHIPPED | OUTPATIENT
Start: 2023-10-23

## 2023-10-31 DIAGNOSIS — R20.0 NUMBNESS AND TINGLING OF RIGHT ARM AND LEG: ICD-10-CM

## 2023-10-31 DIAGNOSIS — L30.9 DERMATITIS: ICD-10-CM

## 2023-10-31 DIAGNOSIS — R20.2 NUMBNESS AND TINGLING OF RIGHT ARM AND LEG: ICD-10-CM

## 2023-10-31 RX ORDER — TRIAMCINOLONE ACETONIDE 1 MG/G
CREAM TOPICAL
Qty: 80 G | Refills: 1 | Status: SHIPPED | OUTPATIENT
Start: 2023-10-31

## 2023-10-31 RX ORDER — LEVOTHYROXINE SODIUM 0.15 MG/1
150 TABLET ORAL DAILY
Qty: 90 TABLET | Refills: 1 | Status: SHIPPED | OUTPATIENT
Start: 2023-10-31

## 2023-10-31 RX ORDER — GABAPENTIN 600 MG/1
TABLET ORAL
Qty: 225 TABLET | Refills: 1 | Status: SHIPPED | OUTPATIENT
Start: 2023-10-31 | End: 2024-05-01

## 2023-11-13 DIAGNOSIS — R20.2 NUMBNESS AND TINGLING OF RIGHT ARM AND LEG: ICD-10-CM

## 2023-11-13 DIAGNOSIS — L30.9 DERMATITIS: ICD-10-CM

## 2023-11-13 DIAGNOSIS — I69.359 HEMIPARESIS FOLLOWING CEREBROVASCULAR ACCIDENT (CVA) (HCC): Chronic | ICD-10-CM

## 2023-11-13 DIAGNOSIS — R20.0 NUMBNESS AND TINGLING OF RIGHT ARM AND LEG: ICD-10-CM

## 2023-11-13 RX ORDER — TRIAMCINOLONE ACETONIDE 1 MG/G
CREAM TOPICAL
Qty: 80 G | Refills: 1 | Status: SHIPPED | OUTPATIENT
Start: 2023-11-13

## 2023-11-13 RX ORDER — GABAPENTIN 600 MG/1
TABLET ORAL
Qty: 225 TABLET | Refills: 1 | Status: SHIPPED | OUTPATIENT
Start: 2023-11-13 | End: 2024-05-14

## 2023-11-13 RX ORDER — BACLOFEN 10 MG/1
10 TABLET ORAL 3 TIMES DAILY PRN
Qty: 90 TABLET | Refills: 0 | Status: SHIPPED | OUTPATIENT
Start: 2023-11-13

## 2023-11-14 ENCOUNTER — ANTI-COAG VISIT (OUTPATIENT)
Dept: PRIMARY CARE CLINIC | Age: 63
End: 2023-11-14

## 2023-11-14 LAB — INR BLD: 1.5

## 2023-11-14 NOTE — PROGRESS NOTES
Increase Coumadin from 5 mg daily to 7.5 mg on Monday Wednesdays and Fridays but continue 5 mg all other days. This is due to her INR of 1.5. Recheck INR is weekly as previously doing.

## 2023-11-14 NOTE — PROGRESS NOTES
Patient is not taking 5mg daily. She is currently taking 5mg 3 days a week and 2.5mg all other days. She does not want to change dose. She would like to keep it the same for one more week and try to eat differently.

## 2023-11-21 ENCOUNTER — TELEPHONE (OUTPATIENT)
Dept: PRIMARY CARE CLINIC | Age: 63
End: 2023-11-21

## 2023-11-21 ENCOUNTER — ANTI-COAG VISIT (OUTPATIENT)
Dept: PRIMARY CARE CLINIC | Age: 63
End: 2023-11-21
Payer: MEDICARE

## 2023-11-21 DIAGNOSIS — D68.2 HYPOPROTHROMBINEMIA (HCC): Primary | ICD-10-CM

## 2023-11-21 LAB — INR BLD: 2.6

## 2023-11-21 PROCEDURE — 93793 ANTICOAG MGMT PT WARFARIN: CPT | Performed by: FAMILY MEDICINE

## 2023-11-21 NOTE — TELEPHONE ENCOUNTER
Order written for nonskilled caregiver for patient's home 5 hours a day or 35 hours a week due to her past history of cerebral vascular accident causing right hemiparesis.

## 2023-11-21 NOTE — TELEPHONE ENCOUNTER
Patient has new insurance and needs new order for non-skilled caregiver to come to home 5 hrs a day or 35 hrs per week. She now has [employer]. This will need authorization.

## 2023-12-04 DIAGNOSIS — F41.9 ANXIETY: ICD-10-CM

## 2023-12-04 RX ORDER — BUSPIRONE HYDROCHLORIDE 15 MG/1
15 TABLET ORAL 2 TIMES DAILY
Qty: 180 TABLET | Refills: 1 | Status: SHIPPED | OUTPATIENT
Start: 2023-12-04

## 2023-12-26 DIAGNOSIS — L30.9 DERMATITIS: ICD-10-CM

## 2023-12-26 DIAGNOSIS — E78.5 HYPERLIPIDEMIA, UNSPECIFIED HYPERLIPIDEMIA TYPE: ICD-10-CM

## 2023-12-26 DIAGNOSIS — F41.9 ANXIETY: ICD-10-CM

## 2023-12-26 RX ORDER — ATORVASTATIN CALCIUM 20 MG/1
20 TABLET, FILM COATED ORAL DAILY
Qty: 90 TABLET | Refills: 0 | Status: SHIPPED | OUTPATIENT
Start: 2023-12-26

## 2023-12-26 RX ORDER — TRIAMCINOLONE ACETONIDE 1 MG/G
CREAM TOPICAL
Qty: 80 G | Refills: 0 | Status: SHIPPED
Start: 2023-12-26 | End: 2024-01-27 | Stop reason: SDUPTHER

## 2023-12-26 RX ORDER — ERGOCALCIFEROL 1.25 MG/1
50000 CAPSULE ORAL WEEKLY
Qty: 12 CAPSULE | Refills: 0 | Status: SHIPPED | OUTPATIENT
Start: 2023-12-26

## 2023-12-26 RX ORDER — CITALOPRAM 40 MG/1
40 TABLET ORAL DAILY
Qty: 90 TABLET | Refills: 0 | Status: SHIPPED | OUTPATIENT
Start: 2023-12-26

## 2024-01-09 LAB — INR BLD: 1.9

## 2024-01-17 ENCOUNTER — ANTI-COAG VISIT (OUTPATIENT)
Dept: PRIMARY CARE CLINIC | Age: 64
End: 2024-01-17

## 2024-01-17 DIAGNOSIS — D68.2 HYPOPROTHROMBINEMIA (HCC): Primary | ICD-10-CM

## 2024-01-22 ENCOUNTER — TELEPHONE (OUTPATIENT)
Dept: PRIMARY CARE CLINIC | Age: 64
End: 2024-01-22

## 2024-01-22 NOTE — TELEPHONE ENCOUNTER
Spoke with [facility] life alert.  They said pt needs to get with her casemanager from [facility] and they will do the referral to [facility] for the life alert. Pt was notified

## 2024-01-22 NOTE — TELEPHONE ENCOUNTER
----- Message from Flores Carr sent at 1/22/2024 10:34 AM EST -----  Subject: Message to Provider    QUESTIONS  Information for Provider? Emily needs prior authorization for [facility]   Alert sent to [employer]. Fax number [phone]. Physicians line for [facility]   [phone]. She already has the equipment but in order for [facility] to get   paid by [employer] they need a prior authorization. [facility] care center number is   [phone].   ---------------------------------------------------------------------------  --------------  CALL BACK INFO  [phone]; OK to leave message on voicemail  ---------------------------------------------------------------------------  --------------  SCRIPT ANSWERS  Relationship to Patient? Self

## 2024-01-23 ENCOUNTER — ANTI-COAG VISIT (OUTPATIENT)
Dept: PRIMARY CARE CLINIC | Age: 64
End: 2024-01-23

## 2024-01-23 LAB — INR BLD: 1.7

## 2024-01-24 ENCOUNTER — TELEPHONE (OUTPATIENT)
Dept: PRIMARY CARE CLINIC | Age: 64
End: 2024-01-24

## 2024-01-25 ENCOUNTER — TELEMEDICINE (OUTPATIENT)
Dept: PRIMARY CARE CLINIC | Age: 64
End: 2024-01-25

## 2024-01-25 DIAGNOSIS — I10 PRIMARY HYPERTENSION: Primary | ICD-10-CM

## 2024-01-25 DIAGNOSIS — N39.41 URGE INCONTINENCE: ICD-10-CM

## 2024-01-25 DIAGNOSIS — I69.359 HEMIPARESIS FOLLOWING CEREBROVASCULAR ACCIDENT (CVA) (HCC): Chronic | ICD-10-CM

## 2024-01-25 NOTE — PROGRESS NOTES
2024    TELEHEALTH EVALUATION -- Audio/Visual    HPI:    Emily Carreno (:  1960) has requested an audio/video evaluation for the following concern(s):    CV: - cp or palp's  Resp: - sob, cough  Gen: living in new house since 23. Still needing HHC. Needs auth to pay co.   MS: in wheelchair mostly. Can't feel R foot much despite being on gabapentin which has helped in past.   : having nocturia at night which Vesicare 5 mg has helped in past. Getting dry mouth with med.    Review of Systems    Prior to Visit Medications    Medication Sig Taking? Authorizing Provider   vitamin D (ERGOCALCIFEROL) 1.25 MG (32888 UT) CAPS capsule Take 1 capsule by mouth once a week  Bryan Disla, DO   atorvastatin (LIPITOR) 20 MG tablet Take 1 tablet by mouth daily  Bryan Disla,    citalopram (CELEXA) 40 MG tablet Take 1 tablet by mouth daily  Bryan Disla,    diclofenac sodium (VOLTAREN) 1 % GEL Apply 4 g topically 4 times daily  Bryan Disla,    triamcinolone (KENALOG) 0.1 % cream Apply topically 2 times daily.  Bryan Disla DO   busPIRone (BUSPAR) 15 MG tablet Take 15 mg by mouth 2 times daily  Bryan Disla DO   baclofen (LIORESAL) 10 MG tablet Take 1 tablet by mouth 3 times daily as needed (muscle spasm)  Bryan Disla DO   gabapentin (NEURONTIN) 600 MG tablet Take 1.5 tablets in am and 1 tablet in the pm  Bryan Disla DO   levothyroxine (SYNTHROID) 150 MCG tablet Take 1 tablet by mouth daily  Bryan Disla,    lisinopril (PRINIVIL;ZESTRIL) 20 MG tablet Take 1 tablet by mouth daily  Bryan Disla,    solifenacin (VESICARE) 5 MG tablet Take 1 tablet by mouth daily  Bryan Disla,    warfarin (COUMADIN) 5 MG tablet TAKE ONE TABLET BY MOUTH DAILY ON SUNDAYS, ,  AND .  Michael Lux, DO   loratadine (CLARITIN) 5 MG chewable tablet Take 1 tablet by mouth daily

## 2024-01-27 ENCOUNTER — PATIENT MESSAGE (OUTPATIENT)
Dept: PRIMARY CARE CLINIC | Age: 64
End: 2024-01-27

## 2024-01-27 DIAGNOSIS — L30.9 DERMATITIS: ICD-10-CM

## 2024-01-29 RX ORDER — TRIAMCINOLONE ACETONIDE 1 MG/G
CREAM TOPICAL
Qty: 80 G | Refills: 1 | Status: SHIPPED | OUTPATIENT
Start: 2024-01-29

## 2024-01-29 RX ORDER — LORATADINE 10 MG/1
5 TABLET ORAL DAILY
Qty: 45 TABLET | Refills: 1 | Status: SHIPPED | OUTPATIENT
Start: 2024-01-29

## 2024-01-29 NOTE — TELEPHONE ENCOUNTER
From: Emily Carreno  To: Dr. Bryan Disla  Sent: 1/27/2024 12:11 PM EST  Subject: Loratadine 10 MG tablet    I can't refill this medicine because it's not in the refill list. I have to cut them in halh because 10 MG is too much and they don't do 5 MG. I have enough to last one more week. Can you please send a script over to pharmacy.it says take 1 tablet by mouth daily   Thank you

## 2024-02-05 ENCOUNTER — TELEPHONE (OUTPATIENT)
Dept: PRIMARY CARE CLINIC | Age: 64
End: 2024-02-05

## 2024-02-05 NOTE — TELEPHONE ENCOUNTER
Unable to do home health care consult and less patient has not in patient face-to-face office visit with me.

## 2024-02-08 ENCOUNTER — OFFICE VISIT (OUTPATIENT)
Dept: PRIMARY CARE CLINIC | Age: 64
End: 2024-02-08

## 2024-02-08 VITALS
OXYGEN SATURATION: 96 % | DIASTOLIC BLOOD PRESSURE: 80 MMHG | TEMPERATURE: 97.2 F | BODY MASS INDEX: 53.92 KG/M2 | WEIGHT: 293 LBS | HEART RATE: 70 BPM | SYSTOLIC BLOOD PRESSURE: 118 MMHG | HEIGHT: 62 IN

## 2024-02-08 DIAGNOSIS — E03.9 ACQUIRED HYPOTHYROIDISM: ICD-10-CM

## 2024-02-08 DIAGNOSIS — I10 PRIMARY HYPERTENSION: Primary | ICD-10-CM

## 2024-02-08 DIAGNOSIS — I69.359 HEMIPARESIS FOLLOWING CEREBROVASCULAR ACCIDENT (CVA) (HCC): ICD-10-CM

## 2024-02-08 DIAGNOSIS — E78.5 HYPERLIPIDEMIA, UNSPECIFIED HYPERLIPIDEMIA TYPE: ICD-10-CM

## 2024-02-08 LAB
ALBUMIN SERPL-MCNC: 4 G/DL (ref 3.5–5.2)
ALP BLD-CCNC: 120 U/L (ref 35–104)
ALT SERPL-CCNC: 7 U/L (ref 0–32)
ANION GAP SERPL CALCULATED.3IONS-SCNC: 17 MMOL/L (ref 7–16)
AST SERPL-CCNC: 16 U/L (ref 0–31)
BILIRUB SERPL-MCNC: 0.5 MG/DL (ref 0–1.2)
BUN BLDV-MCNC: 22 MG/DL (ref 6–23)
CALCIUM SERPL-MCNC: 9.7 MG/DL (ref 8.6–10.2)
CHLORIDE BLD-SCNC: 97 MMOL/L (ref 98–107)
CHOLESTEROL: 120 MG/DL
CO2: 26 MMOL/L (ref 22–29)
CREAT SERPL-MCNC: 1.8 MG/DL (ref 0.5–1)
GFR SERPL CREATININE-BSD FRML MDRD: 31 ML/MIN/1.73M2
GLUCOSE BLD-MCNC: 83 MG/DL (ref 74–99)
HCT VFR BLD CALC: 45.8 % (ref 34–48)
HDLC SERPL-MCNC: 52 MG/DL
HEMOGLOBIN: 13.7 G/DL (ref 11.5–15.5)
LDL CHOLESTEROL: 49 MG/DL
MCH RBC QN AUTO: 26.3 PG (ref 26–35)
MCHC RBC AUTO-ENTMCNC: 29.9 G/DL (ref 32–34.5)
MCV RBC AUTO: 87.9 FL (ref 80–99.9)
PDW BLD-RTO: 15.9 % (ref 11.5–15)
PLATELET # BLD: 251 K/UL (ref 130–450)
PMV BLD AUTO: 10.8 FL (ref 7–12)
POTASSIUM SERPL-SCNC: 4.6 MMOL/L (ref 3.5–5)
RBC # BLD: 5.21 M/UL (ref 3.5–5.5)
SODIUM BLD-SCNC: 140 MMOL/L (ref 132–146)
T4 FREE: 1.6 NG/DL (ref 0.9–1.7)
TOTAL PROTEIN: 8.6 G/DL (ref 6.4–8.3)
TRIGL SERPL-MCNC: 94 MG/DL
TSH SERPL DL<=0.05 MIU/L-ACNC: 0.84 UIU/ML (ref 0.27–4.2)
VLDLC SERPL CALC-MCNC: 19 MG/DL
WBC # BLD: 9.8 K/UL (ref 4.5–11.5)

## 2024-02-08 ASSESSMENT — PATIENT HEALTH QUESTIONNAIRE - PHQ9
SUM OF ALL RESPONSES TO PHQ9 QUESTIONS 1 & 2: 0
2. FEELING DOWN, DEPRESSED OR HOPELESS: 0
SUM OF ALL RESPONSES TO PHQ QUESTIONS 1-9: 0
SUM OF ALL RESPONSES TO PHQ QUESTIONS 1-9: 0
5. POOR APPETITE OR OVEREATING: 0
SUM OF ALL RESPONSES TO PHQ QUESTIONS 1-9: 0
7. TROUBLE CONCENTRATING ON THINGS, SUCH AS READING THE NEWSPAPER OR WATCHING TELEVISION: 0
SUM OF ALL RESPONSES TO PHQ QUESTIONS 1-9: 0
10. IF YOU CHECKED OFF ANY PROBLEMS, HOW DIFFICULT HAVE THESE PROBLEMS MADE IT FOR YOU TO DO YOUR WORK, TAKE CARE OF THINGS AT HOME, OR GET ALONG WITH OTHER PEOPLE: 0
6. FEELING BAD ABOUT YOURSELF - OR THAT YOU ARE A FAILURE OR HAVE LET YOURSELF OR YOUR FAMILY DOWN: 0
9. THOUGHTS THAT YOU WOULD BE BETTER OFF DEAD, OR OF HURTING YOURSELF: 0
4. FEELING TIRED OR HAVING LITTLE ENERGY: 0
1. LITTLE INTEREST OR PLEASURE IN DOING THINGS: 0
8. MOVING OR SPEAKING SO SLOWLY THAT OTHER PEOPLE COULD HAVE NOTICED. OR THE OPPOSITE, BEING SO FIGETY OR RESTLESS THAT YOU HAVE BEEN MOVING AROUND A LOT MORE THAN USUAL: 0
3. TROUBLE FALLING OR STAYING ASLEEP: 0

## 2024-02-08 ASSESSMENT — ENCOUNTER SYMPTOMS
CONSTIPATION: 0
DIARRHEA: 0
HOARSE VOICE: 0
HAIR LOSS: 0

## 2024-02-08 NOTE — PROGRESS NOTES
Emily Carreno, a female of 63 y.o. came to the office 2/8/2024.     Patient Active Problem List   Diagnosis    OA (osteoarthritis)    Hypothyroidism    JOSE on CPAP    HTN (hypertension)    PETRA (acute kidney injury) ([facility])    PFO with atrial septal aneurysm    Adult BMI >=70 kg/sq m (HCC)    Rt Hemiparesis following cerebrovascular accident (CVA) ([facility])    Neuropathy    Depression    Venous insufficiency of both lower extremities    FLAVIO (generalized anxiety disorder)    Varicose veins of left leg with edema    Cellulitis of right leg    Iron deficiency anemia    Postmenopausal vaginal bleeding    Complex endometrial hyperplasia with atypia    Lymphedema of both lower extremities    Pneumonia due to COVID-19 virus    Hemiparesis affecting right side as late effect of cerebrovascular accident (CVA) ([facility])    CKD (chronic kidney disease) stage 3, GFR 30-59 ml/min ([facility])    Morbid obesity with BMI of 60.0-69.9, adult ([facility])    Hypoprothrombinemia (HCC)          Hypertension  This is a chronic problem. The current episode started more than 1 year ago. The problem is controlled. Associated symptoms include anxiety and neck pain. Pertinent negatives include no palpitations. Identifiable causes of hypertension include a thyroid problem.   Thyroid Problem  Presents for follow-up visit. Symptoms include anxiety, depressed mood (over mom's death.), leg swelling and tremors (R hand). Patient reports no constipation, diarrhea, dry skin, hair loss, hoarse voice, nail problem or palpitations. The symptoms have been stable.   Anxiety  Presents for follow-up visit. Symptoms include depressed mood (over mom's death.) and nervous/anxious behavior. Patient reports no excessive worry, impotence, irritability or palpitations. Symptoms occur occasionally.        Gen: needing home health aid 5 hrs a 7 days a wk due to history of stroke and assistance with ADL's. In new home.  Neuro: still difficulty doing tasks due to previous stroke.

## 2024-02-12 ENCOUNTER — TELEPHONE (OUTPATIENT)
Dept: PRIMARY CARE CLINIC | Age: 64
End: 2024-02-12

## 2024-02-12 NOTE — TELEPHONE ENCOUNTER
Called patient discussed labs.  Creatinine up to 1.8 despite not being on any diuretics.  Therefore we will recheck this in 1 month.  She is going to check to see if her home health care will do this for us.  She will let us know.  Remainder labs essentially normal.

## 2024-02-27 ENCOUNTER — TELEPHONE (OUTPATIENT)
Dept: PRIMARY CARE CLINIC | Age: 64
End: 2024-02-27

## 2024-02-27 NOTE — TELEPHONE ENCOUNTER
Spoke with patient.  She was taking Coumadin 2.5 mg on Monday and Fridays and 5 mg all other days.  And I want her to take Coumadin 2.5 mg on Monday, Wednesday, Fridays, and Sundays with 5 mg on Tuesdays, Thursdays, and Saturdays.  Recheck INR in 1 week.

## 2024-03-01 DIAGNOSIS — R20.0 NUMBNESS AND TINGLING OF RIGHT ARM AND LEG: ICD-10-CM

## 2024-03-01 DIAGNOSIS — R20.2 NUMBNESS AND TINGLING OF RIGHT ARM AND LEG: ICD-10-CM

## 2024-03-01 RX ORDER — GABAPENTIN 600 MG/1
TABLET ORAL
Qty: 225 TABLET | Refills: 1 | Status: SHIPPED | OUTPATIENT
Start: 2024-03-01 | End: 2024-08-31

## 2024-03-04 DIAGNOSIS — N39.41 URGE INCONTINENCE: ICD-10-CM

## 2024-03-04 RX ORDER — SOLIFENACIN SUCCINATE 10 MG/1
10 TABLET, FILM COATED ORAL DAILY
Qty: 90 TABLET | Refills: 1 | Status: SHIPPED | OUTPATIENT
Start: 2024-03-04

## 2024-03-05 ENCOUNTER — ANTI-COAG VISIT (OUTPATIENT)
Dept: PRIMARY CARE CLINIC | Age: 64
End: 2024-03-05

## 2024-03-05 LAB — INR BLD: 3.6

## 2024-03-10 DIAGNOSIS — L30.9 DERMATITIS: ICD-10-CM

## 2024-03-11 ENCOUNTER — OFFICE VISIT (OUTPATIENT)
Dept: ORTHOPEDIC SURGERY | Age: 64
End: 2024-03-11
Payer: MEDICARE

## 2024-03-11 VITALS — WEIGHT: 293 LBS | HEIGHT: 62 IN | BODY MASS INDEX: 53.92 KG/M2

## 2024-03-11 DIAGNOSIS — G89.29 CHRONIC RIGHT SHOULDER PAIN: ICD-10-CM

## 2024-03-11 DIAGNOSIS — M25.511 CHRONIC PAIN OF BOTH SHOULDERS: Primary | ICD-10-CM

## 2024-03-11 DIAGNOSIS — M25.511 CHRONIC RIGHT SHOULDER PAIN: ICD-10-CM

## 2024-03-11 DIAGNOSIS — M25.512 CHRONIC PAIN OF BOTH SHOULDERS: Primary | ICD-10-CM

## 2024-03-11 DIAGNOSIS — G89.29 CHRONIC PAIN OF BOTH SHOULDERS: Primary | ICD-10-CM

## 2024-03-11 DIAGNOSIS — M54.2 NECK PAIN: ICD-10-CM

## 2024-03-11 PROCEDURE — 20610 DRAIN/INJ JOINT/BURSA W/O US: CPT | Performed by: NURSE PRACTITIONER

## 2024-03-11 PROCEDURE — 99214 OFFICE O/P EST MOD 30 MIN: CPT | Performed by: NURSE PRACTITIONER

## 2024-03-11 RX ORDER — TRIAMCINOLONE ACETONIDE 40 MG/ML
40 INJECTION, SUSPENSION INTRA-ARTICULAR; INTRAMUSCULAR ONCE
Status: COMPLETED | OUTPATIENT
Start: 2024-03-11 | End: 2024-03-11

## 2024-03-11 RX ORDER — BUPIVACAINE HYDROCHLORIDE 2.5 MG/ML
2 INJECTION, SOLUTION INFILTRATION; PERINEURAL ONCE
Status: COMPLETED | OUTPATIENT
Start: 2024-03-11 | End: 2024-03-11

## 2024-03-11 RX ORDER — TRIAMCINOLONE ACETONIDE 1 MG/G
CREAM TOPICAL
Qty: 80 G | Refills: 1 | Status: SHIPPED | OUTPATIENT
Start: 2024-03-11

## 2024-03-11 RX ORDER — TRIAMCINOLONE ACETONIDE 40 MG/ML
40 INJECTION, SUSPENSION INTRA-ARTICULAR; INTRAMUSCULAR ONCE
Status: CANCELLED | OUTPATIENT
Start: 2024-03-11 | End: 2024-03-11

## 2024-03-11 RX ORDER — BUPIVACAINE HYDROCHLORIDE 2.5 MG/ML
2 INJECTION, SOLUTION INFILTRATION; PERINEURAL ONCE
Status: CANCELLED | OUTPATIENT
Start: 2024-03-11 | End: 2024-03-11

## 2024-03-11 RX ADMIN — TRIAMCINOLONE ACETONIDE 40 MG: 40 INJECTION, SUSPENSION INTRA-ARTICULAR; INTRAMUSCULAR at 09:01

## 2024-03-11 RX ADMIN — BUPIVACAINE HYDROCHLORIDE 5 MG: 2.5 INJECTION, SOLUTION INFILTRATION; PERINEURAL at 09:00

## 2024-03-11 NOTE — PROGRESS NOTES
right shoulder she displays very limited range of motion positive weakness on her rotator cuff strength.  X-rays do display osteoarthritis of the right shoulder.  She understands that she is not currently a surgical candidate and surgery would be limited to form of shoulder arthroplasty.  Patient opted to proceed with a corticosteroid injection on the right shoulder.  She will follow-up with us as needed should symptoms persist.        Ulises Cantu, VIJAYA-CNP  Orthopaedic Surgery   3/11/24  12:16 PM

## 2024-03-12 DIAGNOSIS — I69.359 HEMIPARESIS FOLLOWING CEREBROVASCULAR ACCIDENT (CVA) (HCC): Chronic | ICD-10-CM

## 2024-03-12 RX ORDER — BACLOFEN 10 MG/1
10 TABLET ORAL 3 TIMES DAILY PRN
Qty: 90 TABLET | Refills: 1 | Status: SHIPPED | OUTPATIENT
Start: 2024-03-12

## 2024-03-19 ENCOUNTER — ANTI-COAG VISIT (OUTPATIENT)
Dept: PRIMARY CARE CLINIC | Age: 64
End: 2024-03-19

## 2024-03-19 LAB — INR BLD: 5.1

## 2024-03-19 NOTE — PROGRESS NOTES
No Coumadin today and cut back on her dose to 2.5 mg every day.  No longer 5 mg on Monday and Wednesdays.  Recheck INR 1 week

## 2024-03-24 DIAGNOSIS — E78.5 HYPERLIPIDEMIA, UNSPECIFIED HYPERLIPIDEMIA TYPE: ICD-10-CM

## 2024-03-24 DIAGNOSIS — I69.359 HEMIPARESIS FOLLOWING CEREBROVASCULAR ACCIDENT (CVA) (HCC): Chronic | ICD-10-CM

## 2024-03-24 DIAGNOSIS — F41.9 ANXIETY: ICD-10-CM

## 2024-03-25 RX ORDER — ATORVASTATIN CALCIUM 20 MG/1
20 TABLET, FILM COATED ORAL DAILY
Qty: 90 TABLET | Refills: 1 | Status: SHIPPED | OUTPATIENT
Start: 2024-03-25

## 2024-03-25 RX ORDER — CITALOPRAM 40 MG/1
40 TABLET ORAL DAILY
Qty: 90 TABLET | Refills: 1 | Status: SHIPPED | OUTPATIENT
Start: 2024-03-25

## 2024-03-25 RX ORDER — WARFARIN SODIUM 5 MG/1
TABLET ORAL
Qty: 90 TABLET | Refills: 1 | Status: SHIPPED | OUTPATIENT
Start: 2024-03-25

## 2024-03-25 RX ORDER — ERGOCALCIFEROL 1.25 MG/1
50000 CAPSULE ORAL WEEKLY
Qty: 12 CAPSULE | Refills: 1 | Status: SHIPPED | OUTPATIENT
Start: 2024-03-25

## 2024-03-25 SDOH — ECONOMIC STABILITY: FOOD INSECURITY: WITHIN THE PAST 12 MONTHS, THE FOOD YOU BOUGHT JUST DIDN'T LAST AND YOU DIDN'T HAVE MONEY TO GET MORE.: NEVER TRUE

## 2024-03-25 SDOH — HEALTH STABILITY: PHYSICAL HEALTH: ON AVERAGE, HOW MANY DAYS PER WEEK DO YOU ENGAGE IN MODERATE TO STRENUOUS EXERCISE (LIKE A BRISK WALK)?: 0 DAYS

## 2024-03-25 SDOH — ECONOMIC STABILITY: INCOME INSECURITY: HOW HARD IS IT FOR YOU TO PAY FOR THE VERY BASICS LIKE FOOD, HOUSING, MEDICAL CARE, AND HEATING?: NOT HARD AT ALL

## 2024-03-25 SDOH — HEALTH STABILITY: PHYSICAL HEALTH: ON AVERAGE, HOW MANY MINUTES DO YOU ENGAGE IN EXERCISE AT THIS LEVEL?: 0 MIN

## 2024-03-25 SDOH — ECONOMIC STABILITY: FOOD INSECURITY: WITHIN THE PAST 12 MONTHS, YOU WORRIED THAT YOUR FOOD WOULD RUN OUT BEFORE YOU GOT MONEY TO BUY MORE.: NEVER TRUE

## 2024-03-25 ASSESSMENT — LIFESTYLE VARIABLES
HOW OFTEN DO YOU HAVE A DRINK CONTAINING ALCOHOL: 1
HOW OFTEN DO YOU HAVE SIX OR MORE DRINKS ON ONE OCCASION: 1
HOW MANY STANDARD DRINKS CONTAINING ALCOHOL DO YOU HAVE ON A TYPICAL DAY: PATIENT DOES NOT DRINK
HOW OFTEN DO YOU HAVE A DRINK CONTAINING ALCOHOL: NEVER
HOW MANY STANDARD DRINKS CONTAINING ALCOHOL DO YOU HAVE ON A TYPICAL DAY: 0

## 2024-03-25 ASSESSMENT — PATIENT HEALTH QUESTIONNAIRE - PHQ9
SUM OF ALL RESPONSES TO PHQ QUESTIONS 1-9: 1
SUM OF ALL RESPONSES TO PHQ9 QUESTIONS 1 & 2: 1
SUM OF ALL RESPONSES TO PHQ QUESTIONS 1-9: 1
1. LITTLE INTEREST OR PLEASURE IN DOING THINGS: NOT AT ALL
SUM OF ALL RESPONSES TO PHQ QUESTIONS 1-9: 1
SUM OF ALL RESPONSES TO PHQ QUESTIONS 1-9: 1
2. FEELING DOWN, DEPRESSED OR HOPELESS: SEVERAL DAYS

## 2024-03-27 ENCOUNTER — OFFICE VISIT (OUTPATIENT)
Dept: PAIN MANAGEMENT | Age: 64
End: 2024-03-27
Payer: MEDICARE

## 2024-03-27 VITALS
WEIGHT: 293 LBS | TEMPERATURE: 97.3 F | RESPIRATION RATE: 18 BRPM | DIASTOLIC BLOOD PRESSURE: 84 MMHG | BODY MASS INDEX: 53.92 KG/M2 | OXYGEN SATURATION: 97 % | HEART RATE: 62 BPM | HEIGHT: 62 IN | SYSTOLIC BLOOD PRESSURE: 120 MMHG

## 2024-03-27 DIAGNOSIS — M25.511 CHRONIC PAIN OF BOTH SHOULDERS: ICD-10-CM

## 2024-03-27 DIAGNOSIS — G89.29 CHRONIC PAIN OF BOTH SHOULDERS: ICD-10-CM

## 2024-03-27 DIAGNOSIS — M79.18 CHRONIC MYOFASCIAL PAIN: ICD-10-CM

## 2024-03-27 DIAGNOSIS — M50.30 DDD (DEGENERATIVE DISC DISEASE), CERVICAL: ICD-10-CM

## 2024-03-27 DIAGNOSIS — M25.512 CHRONIC PAIN OF BOTH SHOULDERS: ICD-10-CM

## 2024-03-27 DIAGNOSIS — M54.2 CERVICALGIA: Primary | ICD-10-CM

## 2024-03-27 DIAGNOSIS — G89.29 CHRONIC MYOFASCIAL PAIN: ICD-10-CM

## 2024-03-27 DIAGNOSIS — Z79.01 ANTICOAGULATED ON COUMADIN: ICD-10-CM

## 2024-03-27 DIAGNOSIS — E66.9 OBESITY, UNSPECIFIED CLASSIFICATION, UNSPECIFIED OBESITY TYPE, UNSPECIFIED WHETHER SERIOUS COMORBIDITY PRESENT: ICD-10-CM

## 2024-03-27 DIAGNOSIS — M47.812 CERVICAL SPONDYLOSIS: ICD-10-CM

## 2024-03-27 PROCEDURE — 99204 OFFICE O/P NEW MOD 45 MIN: CPT | Performed by: ANESTHESIOLOGY

## 2024-03-27 PROCEDURE — 3079F DIAST BP 80-89 MM HG: CPT | Performed by: ANESTHESIOLOGY

## 2024-03-27 PROCEDURE — 3074F SYST BP LT 130 MM HG: CPT | Performed by: ANESTHESIOLOGY

## 2024-03-27 NOTE — PROGRESS NOTES
Patient:  Emily Carreno,  1960  Date of Service:  3/27/24      Patient presents to [facility] with complaints of bilateral shoulder pain and posterior right thigh pain that started 10 years ago and has been getting worse.     She states the pain began following trying to get out of a pool    Pain is constant and is described as stabbing, sharp, and burning. She rates the pain as a 10/10 on her worst day , 5/10 on her best day, and a 10/10 on average on the VAS scale.     Pain left shoulder does radiate to left neck. She  does not have numbness, tingling, weakness of the left shoulder.    Alleviating factors include: nothing.  Aggravating factors include:  movement, lifting. She states that the pain does keep her from sleeping at night. She took her last dose of Tramadol, Neurontin, and Tylenol today.     She is not on NSAIDS and  is  on anticoagulation medications to include Coumadin and is managed by PCP.     Previous treatments: Physical Therapy, CSI, and medications..      Personal Expectations from this treatment: increase activity and decrease pain    Temp 97.3 °F (36.3 °C) (Infrared)   Resp 18   Ht 1.575 m (5' 2\")   Wt (!) 170.1 kg (375 lb)   BMI 68.59 kg/m²     No LMP recorded. (Menstrual status: Other - See Notes).    
      Family History   Problem Relation Age of Onset    Diabetes Mother     Diabetes Father         boarderline diabetic    Stroke Father        REVIEW OF SYSTEMS:     Patient specifically denies fever/chills, chest pain, shortness of breath, new bowel or bladder complaints.  All other review of systems was negative.    Review of Systems - Documented reviewed    PHYSICAL EXAMINATION:      /84   Pulse 62   Temp 97.3 °F (36.3 °C) (Infrared)   Resp 18   Ht 1.575 m (5' 2\")   Wt (!) 170.1 kg (375 lb)   SpO2 97%   BMI 68.59 kg/m²     General:      General appearance:  Pleasant and well-hydrated, in no distress and A & O x 3  Build:Morbidly obese  Function: uses a wheel chair    HEENT:    Head:normocephalic, atraumatic  Pupils:regular, round, equal  Sclera: icterus absent    Lungs:    Breathing:normal breathing pattern     CVS:     RRR    Abdomen:    Shape:obese, non-distended, and normal    Cervical spine:    Inspection:normal  Palpation:tenderness paravertebral muscles, tenderness trapezium, left +  Range of motion:reduced flexion, extension, rotation  Spurling's: negative bilaterally    Thoracic spine:     Spine inspection:normal   Palpation:No tenderness over the midline and paraspinal area, bilaterally  Range of motion:normal in flexion, extension rotation bilateral and is not painful.    Lumbar spine:    Spine inspection: Normal   Palpation: Tenderness paravertebral muscles No bilaterally  Range of motion: Decreased,    Musculoskeletal:    Trigger points No     Extremities:    Tremors:None bilaterally upper and lower  Edema:no    Neurological:    Sensory: Normal to light touch     Motor:   CORDERO, no focal deficits    Dermatology:    Skin:no rashes or lesions noted    Assessment/Plan:     Diagnosis Orders   1. Cervicalgia  XR CERVICAL SPINE (4-5 VIEWS)    [facility]      2. DDD (degenerative disc disease), cervical  XR CERVICAL SPINE (4-5 VIEWS)    [facility]

## 2024-03-28 ENCOUNTER — OFFICE VISIT (OUTPATIENT)
Dept: PRIMARY CARE CLINIC | Age: 64
End: 2024-03-28
Payer: MEDICARE

## 2024-03-28 VITALS
BODY MASS INDEX: 68.59 KG/M2 | DIASTOLIC BLOOD PRESSURE: 76 MMHG | SYSTOLIC BLOOD PRESSURE: 124 MMHG | HEART RATE: 58 BPM | TEMPERATURE: 97.6 F | HEIGHT: 62 IN | OXYGEN SATURATION: 99 %

## 2024-03-28 DIAGNOSIS — N18.31 STAGE 3A CHRONIC KIDNEY DISEASE (HCC): ICD-10-CM

## 2024-03-28 DIAGNOSIS — Z12.11 COLON CANCER SCREENING: ICD-10-CM

## 2024-03-28 DIAGNOSIS — N17.9 AKI (ACUTE KIDNEY INJURY) (HCC): ICD-10-CM

## 2024-03-28 DIAGNOSIS — Z00.00 WELCOME TO MEDICARE PREVENTIVE VISIT: Primary | ICD-10-CM

## 2024-03-28 LAB
ANION GAP SERPL CALCULATED.3IONS-SCNC: 11 MMOL/L (ref 7–16)
BUN BLDV-MCNC: 27 MG/DL (ref 6–23)
CALCIUM SERPL-MCNC: 9.8 MG/DL (ref 8.6–10.2)
CHLORIDE BLD-SCNC: 101 MMOL/L (ref 98–107)
CO2: 26 MMOL/L (ref 22–29)
CREAT SERPL-MCNC: 1.9 MG/DL (ref 0.5–1)
GFR SERPL CREATININE-BSD FRML MDRD: 31 ML/MIN/1.73M2
GLUCOSE BLD-MCNC: 84 MG/DL (ref 74–99)
POTASSIUM SERPL-SCNC: 6.3 MMOL/L (ref 3.5–5)
SODIUM BLD-SCNC: 138 MMOL/L (ref 132–146)

## 2024-03-28 PROCEDURE — G0402 INITIAL PREVENTIVE EXAM: HCPCS | Performed by: FAMILY MEDICINE

## 2024-03-28 PROCEDURE — 3078F DIAST BP <80 MM HG: CPT | Performed by: FAMILY MEDICINE

## 2024-03-28 PROCEDURE — 3074F SYST BP LT 130 MM HG: CPT | Performed by: FAMILY MEDICINE

## 2024-03-28 NOTE — PROGRESS NOTES
Medicare Annual Wellness Visit    Emily Carreno is here for Medicare AWV (Annual wellness visit) and Leg Pain (Right leg pain from manipulating her wheel chair)    Assessment & Plan   Welcome to Medicare preventive visit  Stage 3a chronic kidney disease (HCC)  -     Basic Metabolic Panel; Future  BUD (acute kidney injury) (HCC)  -     Basic Metabolic Panel; Future  Colon cancer screening  -     POCT Fecal Immunochemical Test (FIT); Future  - refuses mammo  - continue to monitor inr's weekly. No change in dose presently  - see nutritionist as scheduled.  - cut down on diet pop.    Recommendations for Preventive Services Due: see orders and patient instructions/AVS.  Recommended screening schedule for the next 5-10 years is provided to the patient in written form: see Patient Instructions/AVS.     Return in 4 months (on 7/28/2024) for Medicare Annual Wellness Visit in 1 year.     Subjective   The following acute and/or chronic problems were also addressed today:    Cva: inr 2.2 two days ago. On Coumadin 2.5 mg daily daily now. Was on 5 mg qod with 2.5 mg qod but inr too high.   Bud: Cr 1.8, 6 wks ago    Patient's complete Health Risk Assessment and screening values have been reviewed and are found in Flowsheets. The following problems were reviewed today and where indicated follow up appointments were made and/or referrals ordered.    Positive Risk Factor Screenings with Interventions:    Fall Risk:  Do you feel unsteady or are you worried about falling? : (!) yes  2 or more falls in past year?: no  Fall with injury in past year?: no     Interventions:    Reviewed medications, home hazards, visual acuity, and co-morbidities that can increase risk for falls  In wheelchair            General HRA Questions:  Select all that apply: (!) New or Increased Pain    Pain Interventions:  Back of thigh from her wheelchair.      Activity, Diet, and Weight:  On average, how many days per week do you engage in moderate to strenuous

## 2024-04-01 ENCOUNTER — TELEPHONE (OUTPATIENT)
Dept: PRIMARY CARE CLINIC | Age: 64
End: 2024-04-01

## 2024-04-01 NOTE — TELEPHONE ENCOUNTER
Left message creatinine increased to 1.9 she has chronic kidney disease 3 a stage.  We will continue to monitor this.  I do not see any medications that could be causing her this issue but we we will need to monitor it in the future.

## 2024-04-02 ENCOUNTER — ANTI-COAG VISIT (OUTPATIENT)
Dept: PRIMARY CARE CLINIC | Age: 64
End: 2024-04-02

## 2024-04-02 ENCOUNTER — TELEPHONE (OUTPATIENT)
Dept: PRIMARY CARE CLINIC | Age: 64
End: 2024-04-02

## 2024-04-02 ENCOUNTER — TELEPHONE (OUTPATIENT)
Dept: PHYSICAL THERAPY | Age: 64
End: 2024-04-02

## 2024-04-02 LAB — INR BLD: 1.4

## 2024-04-02 NOTE — TELEPHONE ENCOUNTER
Pt called today, said her PT was 1.4 today, after taking 2.5 of her medication this week. Pls let pt know if she should continue with this dosage.

## 2024-04-02 NOTE — TELEPHONE ENCOUNTER
Patient called still in a great deal of pain with her neck has been taking extra strength tylenol with tramadol - 50 mg is not working at all for her can you increase the tramadol dosage.

## 2024-04-02 NOTE — TELEPHONE ENCOUNTER
I cannot increase the pain med.  That is why they sent her to the pain specialist to see what he would recommend treating her with.  I want her to follow his instructions.

## 2024-04-03 NOTE — TELEPHONE ENCOUNTER
Patient states when she went to pain clinic they talked about her shoulder not legs gave her nothing she states is going to do physical therapy but still wants you to increase her tramadol for leg pain where it is rubbing her wheelchair

## 2024-04-04 ENCOUNTER — TELEPHONE (OUTPATIENT)
Dept: PHYSICAL THERAPY | Age: 64
End: 2024-04-04

## 2024-04-04 PROBLEM — M47.812 CERVICAL SPONDYLOSIS: Status: ACTIVE | Noted: 2024-04-04

## 2024-04-04 PROBLEM — M25.511 CHRONIC PAIN OF BOTH SHOULDERS: Status: ACTIVE | Noted: 2024-04-04

## 2024-04-04 PROBLEM — M50.30 DDD (DEGENERATIVE DISC DISEASE), CERVICAL: Status: ACTIVE | Noted: 2024-04-04

## 2024-04-04 PROBLEM — M54.2 CERVICALGIA: Status: ACTIVE | Noted: 2024-04-04

## 2024-04-04 PROBLEM — G89.29 CHRONIC PAIN OF BOTH SHOULDERS: Status: ACTIVE | Noted: 2024-04-04

## 2024-04-04 PROBLEM — M25.512 CHRONIC PAIN OF BOTH SHOULDERS: Status: ACTIVE | Noted: 2024-04-04

## 2024-04-04 NOTE — TELEPHONE ENCOUNTER
Date: 2024       Patient Name: Emily Carreno  : 1960      MRN: [id number]    No show/no call for PT evaluation scheduled at 1100 today.    Michael Omalley, PT

## 2024-04-09 ENCOUNTER — ANTI-COAG VISIT (OUTPATIENT)
Dept: PRIMARY CARE CLINIC | Age: 64
End: 2024-04-09

## 2024-04-09 LAB — INR BLD: 1.8

## 2024-04-12 DIAGNOSIS — Z12.11 COLON CANCER SCREENING: ICD-10-CM

## 2024-04-12 LAB
CONTROL: NORMAL
FECAL BLOOD IMMUNOCHEMICAL TEST: POSITIVE

## 2024-04-16 ENCOUNTER — TELEPHONE (OUTPATIENT)
Dept: PRIMARY CARE CLINIC | Age: 64
End: 2024-04-16

## 2024-04-16 ENCOUNTER — ANTI-COAG VISIT (OUTPATIENT)
Dept: PRIMARY CARE CLINIC | Age: 64
End: 2024-04-16

## 2024-04-16 LAB — INR BLD: 3.3

## 2024-04-16 NOTE — TELEPHONE ENCOUNTER
Left message that patient's Cologuard test is positive and that I recommend a colonoscopy now for further evaluation to rule out any type of masses or colon cancer.  Call if agrees.

## 2024-04-23 ENCOUNTER — ANTI-COAG VISIT (OUTPATIENT)
Dept: PRIMARY CARE CLINIC | Age: 64
End: 2024-04-23

## 2024-04-23 LAB — INR BLD: 1.5

## 2024-04-23 NOTE — PROGRESS NOTES
Increase Coumadin from 5 mg every day to 7.5 mg every other day with 5 mg on the other days.  Recheck INR at home weekly

## 2024-04-24 LAB
INR BLD: 1.8
PROTIME: NORMAL

## 2024-04-26 RX ORDER — LEVOTHYROXINE SODIUM 0.15 MG/1
150 TABLET ORAL DAILY
Qty: 90 TABLET | Refills: 1 | Status: SHIPPED | OUTPATIENT
Start: 2024-04-26

## 2024-04-30 ENCOUNTER — TELEPHONE (OUTPATIENT)
Dept: PRIMARY CARE CLINIC | Age: 64
End: 2024-04-30

## 2024-04-30 ENCOUNTER — ANTI-COAG VISIT (OUTPATIENT)
Dept: PRIMARY CARE CLINIC | Age: 64
End: 2024-04-30

## 2024-04-30 DIAGNOSIS — M54.2 NECK PAIN: Primary | ICD-10-CM

## 2024-04-30 LAB — INR BLD: 5

## 2024-05-01 ENCOUNTER — TELEPHONE (OUTPATIENT)
Dept: BARIATRICS/WEIGHT MGMT | Age: 64
End: 2024-05-01

## 2024-05-02 NOTE — TELEPHONE ENCOUNTER
X-ray of cervical spine ordered due to her pain down her arms which could be coming from her neck.

## 2024-05-07 ENCOUNTER — TELEPHONE (OUTPATIENT)
Dept: PRIMARY CARE CLINIC | Age: 64
End: 2024-05-07

## 2024-05-07 NOTE — TELEPHONE ENCOUNTER
Pt called with her PT 2.7     Wants to know if she should keep her medication the same or change it.     Please advise

## 2024-05-08 DIAGNOSIS — I10 PRIMARY HYPERTENSION: ICD-10-CM

## 2024-05-08 RX ORDER — LISINOPRIL 20 MG/1
20 TABLET ORAL DAILY
Qty: 90 TABLET | Refills: 1 | Status: SHIPPED | OUTPATIENT
Start: 2024-05-08

## 2024-05-14 ENCOUNTER — TELEPHONE (OUTPATIENT)
Dept: PRIMARY CARE CLINIC | Age: 64
End: 2024-05-14

## 2024-05-14 ENCOUNTER — ANTI-COAG VISIT (OUTPATIENT)
Dept: PRIMARY CARE CLINIC | Age: 64
End: 2024-05-14

## 2024-05-14 LAB — INR BLD: 5.3

## 2024-05-14 NOTE — PROGRESS NOTES
Please verify patient's dose. When was she doing the 7.5mg? Appears it should have been 5mg everyday?

## 2024-05-14 NOTE — TELEPHONE ENCOUNTER
Patient called with INR result states she's taking coumadin 7.5 mg one day then 5 mg the next day and keeps repeating that dose. Is that the dose she is to be doing?

## 2024-05-21 ENCOUNTER — ANTI-COAG VISIT (OUTPATIENT)
Dept: PRIMARY CARE CLINIC | Age: 64
End: 2024-05-21

## 2024-05-21 LAB — INR BLD: 4.3

## 2024-05-21 NOTE — PROGRESS NOTES
Decrease Coumadin from 5 mg daily to 5 mg daily except on Monday, Wednesdays, and Fridays take 2.5 mg.  Continue weekly INR checks.

## 2024-05-23 ENCOUNTER — TELEPHONE (OUTPATIENT)
Dept: PRIMARY CARE CLINIC | Age: 64
End: 2024-05-23

## 2024-05-23 NOTE — TELEPHONE ENCOUNTER
Decrease Coumadin to 2.5 mg daily due to INR 4.5.  She was taking 5 mg 3 days a week and 2.5 mg all other days.  Recheck INR 1 week

## 2024-05-28 ENCOUNTER — ANTI-COAG VISIT (OUTPATIENT)
Dept: PRIMARY CARE CLINIC | Age: 64
End: 2024-05-28

## 2024-05-28 LAB — INR BLD: 3.4

## 2024-06-04 ENCOUNTER — ANTI-COAG VISIT (OUTPATIENT)
Dept: PRIMARY CARE CLINIC | Age: 64
End: 2024-06-04

## 2024-06-04 LAB — INR BLD: 2.5

## 2024-06-05 DIAGNOSIS — F41.9 ANXIETY: ICD-10-CM

## 2024-06-05 DIAGNOSIS — N39.41 URGE INCONTINENCE: ICD-10-CM

## 2024-06-05 RX ORDER — BUSPIRONE HYDROCHLORIDE 15 MG/1
15 TABLET ORAL 2 TIMES DAILY
Qty: 180 TABLET | Refills: 1 | Status: SHIPPED | OUTPATIENT
Start: 2024-06-05

## 2024-06-05 RX ORDER — SOLIFENACIN SUCCINATE 10 MG/1
10 TABLET, FILM COATED ORAL DAILY
Qty: 90 TABLET | Refills: 1 | Status: SHIPPED | OUTPATIENT
Start: 2024-06-05

## 2024-06-19 DIAGNOSIS — L30.9 DERMATITIS: ICD-10-CM

## 2024-06-19 DIAGNOSIS — I69.359 HEMIPARESIS FOLLOWING CEREBROVASCULAR ACCIDENT (CVA) (HCC): Chronic | ICD-10-CM

## 2024-06-19 RX ORDER — BACLOFEN 10 MG/1
10 TABLET ORAL 3 TIMES DAILY PRN
Qty: 90 TABLET | Refills: 1 | Status: SHIPPED | OUTPATIENT
Start: 2024-06-19

## 2024-06-19 RX ORDER — ERGOCALCIFEROL 1.25 MG/1
50000 CAPSULE ORAL WEEKLY
Qty: 12 CAPSULE | Refills: 1 | Status: SHIPPED | OUTPATIENT
Start: 2024-06-19

## 2024-06-19 RX ORDER — TRIAMCINOLONE ACETONIDE 1 MG/G
CREAM TOPICAL
Qty: 80 G | Refills: 1 | Status: SHIPPED | OUTPATIENT
Start: 2024-06-19

## 2024-06-19 RX ORDER — WARFARIN SODIUM 5 MG/1
TABLET ORAL
Qty: 90 TABLET | Refills: 1 | Status: SHIPPED | OUTPATIENT
Start: 2024-06-19

## 2024-06-19 RX ORDER — LORATADINE 10 MG/1
5 TABLET ORAL DAILY
Qty: 45 TABLET | Refills: 1 | Status: SHIPPED | OUTPATIENT
Start: 2024-06-19

## 2024-06-28 ENCOUNTER — PATIENT MESSAGE (OUTPATIENT)
Dept: PRIMARY CARE CLINIC | Age: 64
End: 2024-06-28

## 2024-06-28 NOTE — TELEPHONE ENCOUNTER
From: Emily Carreno  To: Dr. Bryan Disla  Sent: 6/28/2024 11:30 AM EDT  Subject: Left arm    If there is nothing showing on exray of neck and shoulder , then Why am I having so much pain in my left arm constantly. It starts in my neck then goes to shoulder and right down arm to wrist never to my hand this pain is so bad Tylenol don't even touch it I use the pain cream everyday. I'm so tired of being in pain all the time it's rediclous

## 2024-07-01 RX ORDER — METHYLPREDNISOLONE 4 MG/1
TABLET ORAL
Qty: 1 KIT | Refills: 0 | Status: SHIPPED | OUTPATIENT
Start: 2024-07-01

## 2024-07-30 ENCOUNTER — ANTI-COAG VISIT (OUTPATIENT)
Dept: PRIMARY CARE CLINIC | Age: 64
End: 2024-07-30

## 2024-07-30 LAB — INR BLD: 4.2

## 2024-08-06 ENCOUNTER — ANTI-COAG VISIT (OUTPATIENT)
Dept: PRIMARY CARE CLINIC | Age: 64
End: 2024-08-06

## 2024-08-06 ENCOUNTER — TELEPHONE (OUTPATIENT)
Dept: PRIMARY CARE CLINIC | Age: 64
End: 2024-08-06

## 2024-08-06 LAB — INR BLD: 4.3

## 2024-08-06 NOTE — PROGRESS NOTES
Decrease Coumadin from 2.5 mg every Tuesday and Fridays and 5 mg all other days to 2.5 mg on Tuesdays, Thursdays, Saturdays and Sundays.  Take 5 mg Monday Wednesday and Friday

## 2024-08-07 ENCOUNTER — TELEPHONE (OUTPATIENT)
Dept: PRIMARY CARE CLINIC | Age: 64
End: 2024-08-07

## 2024-08-07 NOTE — TELEPHONE ENCOUNTER
Please call patient reguarding her INR results. She stated she called yesterday and is waiting a call back.

## 2024-08-13 ENCOUNTER — ANTI-COAG VISIT (OUTPATIENT)
Dept: PRIMARY CARE CLINIC | Age: 64
End: 2024-08-13

## 2024-08-13 LAB — INR BLD: 4.9

## 2024-09-05 DIAGNOSIS — L30.9 DERMATITIS: ICD-10-CM

## 2024-09-05 DIAGNOSIS — N39.41 URGE INCONTINENCE: ICD-10-CM

## 2024-09-05 RX ORDER — SOLIFENACIN SUCCINATE 10 MG/1
10 TABLET, FILM COATED ORAL DAILY
Qty: 90 TABLET | Refills: 1 | Status: SHIPPED | OUTPATIENT
Start: 2024-09-05

## 2024-09-05 RX ORDER — TRIAMCINOLONE ACETONIDE 1 MG/G
CREAM TOPICAL
Qty: 80 G | Refills: 1 | Status: SHIPPED | OUTPATIENT
Start: 2024-09-05

## 2024-09-12 DIAGNOSIS — I69.359 HEMIPARESIS FOLLOWING CEREBROVASCULAR ACCIDENT (CVA) (HCC): Chronic | ICD-10-CM

## 2024-09-12 DIAGNOSIS — N39.41 URGE INCONTINENCE: ICD-10-CM

## 2024-09-12 RX ORDER — SOLIFENACIN SUCCINATE 10 MG/1
10 TABLET, FILM COATED ORAL DAILY
Qty: 90 TABLET | Refills: 1 | Status: SHIPPED | OUTPATIENT
Start: 2024-09-12

## 2024-09-12 RX ORDER — WARFARIN SODIUM 5 MG/1
TABLET ORAL
Qty: 90 TABLET | Refills: 1 | Status: SHIPPED | OUTPATIENT
Start: 2024-09-12

## 2024-09-12 RX ORDER — ERGOCALCIFEROL 1.25 MG/1
50000 CAPSULE, LIQUID FILLED ORAL WEEKLY
Qty: 12 CAPSULE | Refills: 1 | Status: SHIPPED | OUTPATIENT
Start: 2024-09-12

## 2024-09-14 NOTE — PROGRESS NOTES
tablet; TAKE 1/2 TABLET BY MOUTH ONCE DAILY    Acquired hypothyroidism  -     levothyroxine (SYNTHROID) 175 MCG tablet; Take 1 tablet by mouth Daily    Hyperlipidemia, unspecified hyperlipidemia type  -     atorvastatin (LIPITOR) 20 MG tablet; Take 1 tablet by mouth nightly    Rt Hemiparesis following cerebrovascular accident (CVA) (HCC)  -     baclofen (LIORESAL) 10 MG tablet; TAKE 1/2 TABLET BY MOUTH 2 TIMES A DAY  -     warfarin (COUMADIN) 5 MG tablet; Indications: Sun - Mon - Wed - Fri TAKE 1 TABLET DAILY AS DIRECTED  -     warfarin (COUMADIN) 2.5 MG tablet; Take 1 tablet by mouth daily Indications: Sat - Tue - Thurs    Numbness and tingling of right arm and leg  -     gabapentin (NEURONTIN) 600 MG tablet; Take 1 tablet by mouth 3 times daily for 180 days. Lymphedema of both lower extremities    JOSE on CPAP    Other orders  -     vitamin D (ERGOCALCIFEROL) 1.25 MG (68111 UT) CAPS capsule; Take 1 capsule by mouth once a week  -     busPIRone (BUSPAR) 15 MG tablet; Take 7.5 mg by mouth 2 times daily    - increase gabapentin to tid but take only 300 mg with lunch. - bp stable stay on med  - continue diet!!!  - will try to order pt/inr machine for home use. - tylenol for hand pain. - squeeze ball or dough for right hand strengthening  -Please allow Flexitouch compression pump that covers her abdominal area to help with her lymphatic massage treatment. - continue CPAP at current settings    Return in about 4 months (around 9/12/2020) for or for acute problem. Josh Maxwell,     TeleMedicine Patient Consent    This visit was performed as a virtual video visit using a synchronous, two-way, audio-video telehealth technology platform. Patient identification was verified at the start of the visit, including the patient's telephone number and physical location. I discussed with the patient the nature of our telehealth visits, that:     1.  Due to the nature of an audio- video modality, the only None

## 2024-09-19 DIAGNOSIS — N39.41 URGE INCONTINENCE: ICD-10-CM

## 2024-09-20 ENCOUNTER — OFFICE VISIT (OUTPATIENT)
Dept: PRIMARY CARE CLINIC | Age: 64
End: 2024-09-20

## 2024-09-20 VITALS
DIASTOLIC BLOOD PRESSURE: 72 MMHG | WEIGHT: 293 LBS | BODY MASS INDEX: 68.59 KG/M2 | SYSTOLIC BLOOD PRESSURE: 124 MMHG | OXYGEN SATURATION: 97 % | HEART RATE: 79 BPM | TEMPERATURE: 98 F

## 2024-09-20 DIAGNOSIS — E78.5 HYPERLIPIDEMIA, UNSPECIFIED HYPERLIPIDEMIA TYPE: ICD-10-CM

## 2024-09-20 DIAGNOSIS — N18.31 STAGE 3A CHRONIC KIDNEY DISEASE (HCC): ICD-10-CM

## 2024-09-20 DIAGNOSIS — I10 PRIMARY HYPERTENSION: Primary | ICD-10-CM

## 2024-09-20 DIAGNOSIS — I69.359 HEMIPARESIS FOLLOWING CEREBROVASCULAR ACCIDENT (CVA) (HCC): Chronic | ICD-10-CM

## 2024-09-20 DIAGNOSIS — M19.011 LOCALIZED OSTEOARTHRITIS OF RIGHT SHOULDER: ICD-10-CM

## 2024-09-20 DIAGNOSIS — E03.9 ACQUIRED HYPOTHYROIDISM: ICD-10-CM

## 2024-09-20 PROBLEM — D68.2 HYPOPROTHROMBINEMIA (HCC): Status: RESOLVED | Noted: 2021-06-03 | Resolved: 2024-09-20

## 2024-09-20 RX ORDER — LORATADINE 10 MG/1
5 TABLET ORAL DAILY
Qty: 45 TABLET | Refills: 1 | Status: SHIPPED | OUTPATIENT
Start: 2024-09-20

## 2024-09-20 RX ORDER — SOLIFENACIN SUCCINATE 10 MG/1
10 TABLET, FILM COATED ORAL DAILY
Qty: 90 TABLET | Refills: 1 | Status: SHIPPED | OUTPATIENT
Start: 2024-09-20

## 2024-09-20 RX ORDER — METHYLPREDNISOLONE ACETATE 40 MG/ML
40 INJECTION, SUSPENSION INTRA-ARTICULAR; INTRALESIONAL; INTRAMUSCULAR; SOFT TISSUE ONCE
Status: COMPLETED | OUTPATIENT
Start: 2024-09-20 | End: 2024-09-23

## 2024-09-20 RX ORDER — WARFARIN SODIUM 5 MG/1
TABLET ORAL
Qty: 90 TABLET | Refills: 1 | Status: SHIPPED
Start: 2024-09-20 | End: 2024-09-21 | Stop reason: SDUPTHER

## 2024-09-21 DIAGNOSIS — I69.359 HEMIPARESIS FOLLOWING CEREBROVASCULAR ACCIDENT (CVA) (HCC): Chronic | ICD-10-CM

## 2024-09-23 RX ADMIN — METHYLPREDNISOLONE ACETATE 40 MG: 40 INJECTION, SUSPENSION INTRA-ARTICULAR; INTRALESIONAL; INTRAMUSCULAR; SOFT TISSUE at 11:46

## 2024-09-24 ENCOUNTER — ANTI-COAG VISIT (OUTPATIENT)
Dept: PRIMARY CARE CLINIC | Age: 64
End: 2024-09-24

## 2024-09-24 LAB — INR BLD: 1.7

## 2024-09-24 RX ORDER — WARFARIN SODIUM 5 MG/1
TABLET ORAL
Qty: 90 TABLET | Refills: 1 | Status: SHIPPED | OUTPATIENT
Start: 2024-09-24

## 2024-09-25 ENCOUNTER — TELEPHONE (OUTPATIENT)
Dept: PRIMARY CARE CLINIC | Age: 64
End: 2024-09-25

## 2024-09-26 RX ORDER — WARFARIN SODIUM 2.5 MG/1
TABLET ORAL
Qty: 90 TABLET | Refills: 1 | Status: SHIPPED | OUTPATIENT
Start: 2024-09-26

## 2024-09-27 DIAGNOSIS — F41.9 ANXIETY: ICD-10-CM

## 2024-09-27 DIAGNOSIS — I69.359 HEMIPARESIS FOLLOWING CEREBROVASCULAR ACCIDENT (CVA) (HCC): Chronic | ICD-10-CM

## 2024-09-27 RX ORDER — WARFARIN SODIUM 2.5 MG/1
TABLET ORAL
Qty: 90 TABLET | Refills: 1 | Status: CANCELLED | OUTPATIENT
Start: 2024-09-27

## 2024-09-27 RX ORDER — CITALOPRAM HYDROBROMIDE 40 MG/1
40 TABLET ORAL DAILY
Qty: 90 TABLET | Refills: 1 | Status: SHIPPED | OUTPATIENT
Start: 2024-09-27

## 2024-09-27 RX ORDER — BACLOFEN 10 MG/1
10 TABLET ORAL 3 TIMES DAILY PRN
Qty: 90 TABLET | Refills: 1 | Status: SHIPPED | OUTPATIENT
Start: 2024-09-27

## 2024-10-01 ENCOUNTER — ANTI-COAG VISIT (OUTPATIENT)
Dept: PRIMARY CARE CLINIC | Age: 64
End: 2024-10-01

## 2024-10-01 LAB — INR BLD: 1.6

## 2024-10-01 NOTE — PROGRESS NOTES
Increase Coumadin to 5 mg from Monday and Fridays to Monday, Wednesdays and Fridays.  Continue 2.5 mg the other 4 days of the week.  Recheck INR 1 week

## 2024-10-11 DIAGNOSIS — R20.2 NUMBNESS AND TINGLING OF RIGHT ARM AND LEG: ICD-10-CM

## 2024-10-11 DIAGNOSIS — E78.5 HYPERLIPIDEMIA, UNSPECIFIED HYPERLIPIDEMIA TYPE: ICD-10-CM

## 2024-10-11 DIAGNOSIS — R20.0 NUMBNESS AND TINGLING OF RIGHT ARM AND LEG: ICD-10-CM

## 2024-10-11 DIAGNOSIS — L30.9 DERMATITIS: ICD-10-CM

## 2024-10-13 RX ORDER — LEVOTHYROXINE SODIUM 150 UG/1
150 TABLET ORAL DAILY
Qty: 90 TABLET | Refills: 1 | Status: SHIPPED | OUTPATIENT
Start: 2024-10-13

## 2024-10-13 RX ORDER — TRIAMCINOLONE ACETONIDE 1 MG/G
CREAM TOPICAL
Qty: 80 G | Refills: 1 | Status: SHIPPED | OUTPATIENT
Start: 2024-10-13

## 2024-10-13 RX ORDER — GABAPENTIN 600 MG/1
TABLET ORAL
Qty: 225 TABLET | Refills: 1 | Status: SHIPPED | OUTPATIENT
Start: 2024-10-13 | End: 2025-04-12

## 2024-10-13 RX ORDER — ATORVASTATIN CALCIUM 20 MG/1
20 TABLET, FILM COATED ORAL DAILY
Qty: 90 TABLET | Refills: 1 | Status: SHIPPED | OUTPATIENT
Start: 2024-10-13

## 2024-10-18 ENCOUNTER — APPOINTMENT (OUTPATIENT)
Dept: RADIOLOGY | Facility: HOSPITAL | Age: 64
End: 2024-10-18
Payer: MEDICARE

## 2024-10-18 ENCOUNTER — HOSPITAL ENCOUNTER (EMERGENCY)
Facility: HOSPITAL | Age: 64
Discharge: HOME | End: 2024-10-19
Attending: EMERGENCY MEDICINE
Payer: MEDICARE

## 2024-10-18 DIAGNOSIS — M25.571 ACUTE RIGHT ANKLE PAIN: Primary | ICD-10-CM

## 2024-10-18 PROCEDURE — 93971 EXTREMITY STUDY: CPT | Performed by: RADIOLOGY

## 2024-10-18 PROCEDURE — 73610 X-RAY EXAM OF ANKLE: CPT | Mod: RIGHT SIDE | Performed by: STUDENT IN AN ORGANIZED HEALTH CARE EDUCATION/TRAINING PROGRAM

## 2024-10-18 PROCEDURE — 73610 X-RAY EXAM OF ANKLE: CPT | Mod: RT

## 2024-10-18 PROCEDURE — 93971 EXTREMITY STUDY: CPT

## 2024-10-18 PROCEDURE — 2500000001 HC RX 250 WO HCPCS SELF ADMINISTERED DRUGS (ALT 637 FOR MEDICARE OP): Performed by: EMERGENCY MEDICINE

## 2024-10-18 PROCEDURE — 99284 EMERGENCY DEPT VISIT MOD MDM: CPT | Mod: 25 | Performed by: EMERGENCY MEDICINE

## 2024-10-18 RX ORDER — ACETAMINOPHEN 325 MG/1
975 TABLET ORAL ONCE
Status: COMPLETED | OUTPATIENT
Start: 2024-10-18 | End: 2024-10-18

## 2024-10-18 RX ORDER — OXYCODONE HYDROCHLORIDE 5 MG/1
5 TABLET ORAL ONCE
Status: COMPLETED | OUTPATIENT
Start: 2024-10-18 | End: 2024-10-18

## 2024-10-18 RX ADMIN — ACETAMINOPHEN 975 MG: 325 TABLET ORAL at 18:47

## 2024-10-18 RX ADMIN — OXYCODONE HYDROCHLORIDE 5 MG: 5 TABLET ORAL at 20:22

## 2024-10-18 ASSESSMENT — LIFESTYLE VARIABLES
EVER FELT BAD OR GUILTY ABOUT YOUR DRINKING: NO
HAVE YOU EVER FELT YOU SHOULD CUT DOWN ON YOUR DRINKING: NO
TOTAL SCORE: 0
EVER HAD A DRINK FIRST THING IN THE MORNING TO STEADY YOUR NERVES TO GET RID OF A HANGOVER: NO
HAVE PEOPLE ANNOYED YOU BY CRITICIZING YOUR DRINKING: NO

## 2024-10-18 ASSESSMENT — PAIN - FUNCTIONAL ASSESSMENT
PAIN_FUNCTIONAL_ASSESSMENT: 0-10
PAIN_FUNCTIONAL_ASSESSMENT: 0-10

## 2024-10-18 ASSESSMENT — COLUMBIA-SUICIDE SEVERITY RATING SCALE - C-SSRS
1. IN THE PAST MONTH, HAVE YOU WISHED YOU WERE DEAD OR WISHED YOU COULD GO TO SLEEP AND NOT WAKE UP?: NO
6. HAVE YOU EVER DONE ANYTHING, STARTED TO DO ANYTHING, OR PREPARED TO DO ANYTHING TO END YOUR LIFE?: NO
2. HAVE YOU ACTUALLY HAD ANY THOUGHTS OF KILLING YOURSELF?: NO

## 2024-10-18 ASSESSMENT — PAIN SCALES - GENERAL
PAINLEVEL_OUTOF10: 8
PAINLEVEL_OUTOF10: 6
PAINLEVEL_OUTOF10: 9
PAINLEVEL_OUTOF10: 9

## 2024-10-18 ASSESSMENT — PAIN DESCRIPTION - LOCATION: LOCATION: LEG

## 2024-10-18 ASSESSMENT — PAIN DESCRIPTION - ORIENTATION: ORIENTATION: RIGHT

## 2024-10-18 NOTE — ED PROVIDER NOTES
HPI   Chief Complaint   Patient presents with    Leg Pain       HPI  HISTORY OF PRESENT ILLNESS:  The patient is a 64-year-old female presents to the emergency department with right ankle pain.  The patient started yesterday to have right ankle pain.  No known trauma.  No known fever, chills, nausea and vomiting.  Patient also noted that the right leg started to become more swollen.  She has been compliant on her Coumadin and last INR was 2.2.  Denied any redness, fever, chills.  Motor intact.  She is able to weight-bear.  She has not taking any medication other than topical medications for the ankle pain.      Past Medical History: Hypothyroidism, hypertension, PFO, prior CVA, depression, venous insufficiency he had lower extremity, generalized anxiety, chronic kidney disease    __________________________________________________________  PHYSICAL EXAM:    Appearance: Alert, oriented , cooperative   Skin: Intact,  dry skin, no lesions, rash, petechiae or purpura.   Eyes: PERRLA, EOMs intact,  Conjunctiva pink with no redness or exudates.    HENT: Normocephalic, atraumatic. Nares patent   Neck: Supple. Trachea at midline.   Pulmonary: Lung sounds are clear bilaterally.  There is no rales, rhonchi, or wheezing.  Cardiac: Regular rate and rhythm, no rubs, murmurs, or gallops. No JVD,   Abdomen: Abdomen is soft, nontender, and nondistended.  No palpable organomegaly.  No rebound or guarding.  No CVA tenderness. Nonsurgical abdomen  Genitourinary: Exam deferred.  Musculoskeletal: no edema, pain, cyanosis, or deformity in extremities. Pulses full and equal.   Neurological:  Cranial nerves are grossly intact, grossly normal sensation, no weakness, no focal findings identified.    __________________________________________________________  MEDICAL DECISION MAKING:    Patient was seen and examined. Differential diagnosis for Ankle pain includes fracture, dislocation, osteoarthritis.  Patient lower extremity is chronically  swollen due to lymphedema.  There is no overlying redness on the skin concerning for cellulitis or joint infection.  Patient vital signs did not show signs of sepsis.  She typically uses a wheelchair or walks with a walker.  X-ray and DVT scan was ordered.  Note that I had lower concern of DVT given that patient is currently anticoagulated.  X-ray was negative for any fracture.  There is not on specific tissue swelling consistent with the patient's lymphedema.  Again, not concern for cellulitis.  Patient was able to ambulate, also decreasing the concern for a joint infection.  Was given orthopedic follow-up with a ankle specialist, return precautions discussed, including signs of cellulitis or ankle infection.  Patient has pain medicine at home, tramadol.  Patient verbalized understanding, agreed clinic and patient was discharged home.      Chronic Medical Conditions Significantly Affecting Care: Hypothyroidism, hypertension, PFO, prior CVA, depression, venous insufficiency he had lower extremity, generalized anxiety, chronic kidney disease    External Records Reviewed: I reviewed recent and relevant outside records including: Primary care physician note from September 20, 2024    Hardeep Suárez  Emergency Medicine      Patient History   No past medical history on file.  No past surgical history on file.  No family history on file.  Social History     Tobacco Use    Smoking status: Not on file    Smokeless tobacco: Not on file   Substance Use Topics    Alcohol use: Not on file    Drug use: Not on file       Physical Exam   ED Triage Vitals [10/18/24 1809]   Temperature Heart Rate Respirations BP   36.8 °C (98.3 °F) 61 16 137/76      Pulse Ox Temp Source Heart Rate Source Patient Position   98 % Temporal Monitor Lying      BP Location FiO2 (%)     Left arm --       Physical Exam      ED Course & MDM   ED Course as of 10/18/24 2009   Fri Oct 18, 2024   1938 XR ankle right 3+ views  IMPRESSION:  No evidence of acute  fracture or dislocation. Nonspecific diffuse  soft tissue swelling around the lower leg with phleboliths. [WJ]   2000 Passed ambulation [WJ]      ED Course User Index  [WJ] Hardeep Suárez DO         Diagnoses as of 10/18/24 2009   Acute right ankle pain                 No data recorded     [place] Coma Scale Score: 15 (10/18/24 1811 : Nelli Leon, EMT)                           Medical Decision Making      Procedure  Procedures     Hardeep Suárez DO  10/18/24 2010

## 2024-10-19 VITALS
DIASTOLIC BLOOD PRESSURE: 56 MMHG | OXYGEN SATURATION: 98 % | HEIGHT: 63 IN | HEART RATE: 57 BPM | RESPIRATION RATE: 18 BRPM | TEMPERATURE: 98.3 F | SYSTOLIC BLOOD PRESSURE: 115 MMHG | BODY MASS INDEX: 51.91 KG/M2 | WEIGHT: 293 LBS

## 2024-10-22 ENCOUNTER — ANTI-COAG VISIT (OUTPATIENT)
Dept: PRIMARY CARE CLINIC | Age: 64
End: 2024-10-22

## 2024-10-22 LAB — INR BLD: 1.3

## 2024-11-04 DIAGNOSIS — R20.0 NUMBNESS AND TINGLING OF RIGHT ARM AND LEG: ICD-10-CM

## 2024-11-04 DIAGNOSIS — E78.5 HYPERLIPIDEMIA, UNSPECIFIED HYPERLIPIDEMIA TYPE: ICD-10-CM

## 2024-11-04 DIAGNOSIS — F41.9 ANXIETY: ICD-10-CM

## 2024-11-04 DIAGNOSIS — I69.359 HEMIPARESIS FOLLOWING CEREBROVASCULAR ACCIDENT (CVA) (HCC): Chronic | ICD-10-CM

## 2024-11-04 DIAGNOSIS — L30.9 DERMATITIS: ICD-10-CM

## 2024-11-04 DIAGNOSIS — N39.41 URGE INCONTINENCE: ICD-10-CM

## 2024-11-04 DIAGNOSIS — R20.2 NUMBNESS AND TINGLING OF RIGHT ARM AND LEG: ICD-10-CM

## 2024-11-04 DIAGNOSIS — I10 PRIMARY HYPERTENSION: ICD-10-CM

## 2024-11-04 RX ORDER — LEVOTHYROXINE SODIUM 150 UG/1
150 TABLET ORAL DAILY
Qty: 90 TABLET | Refills: 1 | Status: SHIPPED | OUTPATIENT
Start: 2024-11-04

## 2024-11-04 RX ORDER — ATORVASTATIN CALCIUM 20 MG/1
20 TABLET, FILM COATED ORAL DAILY
Qty: 90 TABLET | Refills: 1 | Status: SHIPPED | OUTPATIENT
Start: 2024-11-04

## 2024-11-04 RX ORDER — WARFARIN SODIUM 5 MG/1
TABLET ORAL
Qty: 90 TABLET | Refills: 1 | Status: SHIPPED | OUTPATIENT
Start: 2024-11-04

## 2024-11-04 RX ORDER — WARFARIN SODIUM 2.5 MG/1
TABLET ORAL
Qty: 90 TABLET | Refills: 1 | Status: SHIPPED | OUTPATIENT
Start: 2024-11-04

## 2024-11-04 RX ORDER — LISINOPRIL 20 MG/1
20 TABLET ORAL DAILY
Qty: 90 TABLET | Refills: 1 | Status: SHIPPED | OUTPATIENT
Start: 2024-11-04

## 2024-11-04 RX ORDER — TRIAMCINOLONE ACETONIDE 1 MG/G
CREAM TOPICAL
Qty: 80 G | Refills: 1 | Status: SHIPPED | OUTPATIENT
Start: 2024-11-04

## 2024-11-04 RX ORDER — ERGOCALCIFEROL 1.25 MG/1
50000 CAPSULE, LIQUID FILLED ORAL WEEKLY
Qty: 12 CAPSULE | Refills: 1 | Status: SHIPPED | OUTPATIENT
Start: 2024-11-04

## 2024-11-04 RX ORDER — BUSPIRONE HYDROCHLORIDE 15 MG/1
15 TABLET ORAL 2 TIMES DAILY
Qty: 180 TABLET | Refills: 1 | Status: SHIPPED | OUTPATIENT
Start: 2024-11-04

## 2024-11-04 RX ORDER — GABAPENTIN 600 MG/1
TABLET ORAL
Qty: 225 TABLET | Refills: 1 | Status: SHIPPED | OUTPATIENT
Start: 2024-11-04 | End: 2025-05-04

## 2024-11-04 RX ORDER — SOLIFENACIN SUCCINATE 10 MG/1
10 TABLET, FILM COATED ORAL DAILY
Qty: 90 TABLET | Refills: 1 | Status: SHIPPED | OUTPATIENT
Start: 2024-11-04

## 2024-11-04 RX ORDER — LORATADINE 10 MG/1
5 TABLET ORAL DAILY
Qty: 45 TABLET | Refills: 1 | Status: SHIPPED | OUTPATIENT
Start: 2024-11-04

## 2024-11-04 RX ORDER — BACLOFEN 10 MG/1
10 TABLET ORAL 3 TIMES DAILY PRN
Qty: 90 TABLET | Refills: 1 | Status: SHIPPED | OUTPATIENT
Start: 2024-11-04

## 2024-11-04 RX ORDER — CITALOPRAM HYDROBROMIDE 40 MG/1
40 TABLET ORAL DAILY
Qty: 90 TABLET | Refills: 1 | Status: SHIPPED | OUTPATIENT
Start: 2024-11-04

## 2024-11-12 ENCOUNTER — ANTI-COAG VISIT (OUTPATIENT)
Dept: PRIMARY CARE CLINIC | Age: 64
End: 2024-11-12

## 2024-11-12 LAB — INR BLD: 3.9

## 2024-11-12 NOTE — PROGRESS NOTES
Decrease Coumadin from 5 mg Monday Wednesday and Friday to just Monday and Fridays.  Continue 2.5 all other days.  Recheck INR 2 weeks

## 2024-11-13 ENCOUNTER — TELEPHONE (OUTPATIENT)
Dept: PRIMARY CARE CLINIC | Age: 64
End: 2024-11-13

## 2024-11-13 NOTE — TELEPHONE ENCOUNTER
[employer] three way called with patient requesting an authorization from you per your request.    Please see chart note from patient on 11/07/2024    Thank you

## 2024-11-18 ENCOUNTER — TELEPHONE (OUTPATIENT)
Dept: PRIMARY CARE CLINIC | Age: 64
End: 2024-11-18

## 2024-11-18 DIAGNOSIS — Z30.432 ENCOUNTER FOR IUD REMOVAL: Primary | ICD-10-CM

## 2024-11-18 NOTE — TELEPHONE ENCOUNTER
Patient is asking if you could put in a referral for her to go back to Dr Teofilo Pop.     She is her OBGYN in [place], but she hasn't seen her in several years and needs her IUD removed, but she needs a referral.    Thank you

## 2024-11-22 ENCOUNTER — TELEPHONE (OUTPATIENT)
Dept: PRIMARY CARE CLINIC | Age: 64
End: 2024-11-22

## 2024-11-22 DIAGNOSIS — I69.359 HEMIPARESIS FOLLOWING CEREBROVASCULAR ACCIDENT (CVA) (HCC): ICD-10-CM

## 2024-11-22 NOTE — TELEPHONE ENCOUNTER
Patient is now asking for a referral to a home health agency, to help her around the house, maybe three days a week.    Thank you

## 2024-11-25 NOTE — TELEPHONE ENCOUNTER
Home health consult put in for home health aide.  Please let patient know that her Cologuard test was positive and I recommend a colonoscopy to rule out colon cancer for her.  If she agrees let us know and we could set her up with a surgeon.

## 2024-12-03 ENCOUNTER — OFFICE VISIT (OUTPATIENT)
Dept: PRIMARY CARE CLINIC | Age: 64
End: 2024-12-03

## 2024-12-03 VITALS
BODY MASS INDEX: 51.91 KG/M2 | RESPIRATION RATE: 18 BRPM | HEART RATE: 72 BPM | TEMPERATURE: 97.7 F | HEIGHT: 63 IN | OXYGEN SATURATION: 97 % | WEIGHT: 293 LBS | DIASTOLIC BLOOD PRESSURE: 72 MMHG | SYSTOLIC BLOOD PRESSURE: 124 MMHG

## 2024-12-03 DIAGNOSIS — I10 PRIMARY HYPERTENSION: ICD-10-CM

## 2024-12-03 DIAGNOSIS — I67.2 CEREBRAL ATHEROSCLEROSIS: ICD-10-CM

## 2024-12-03 DIAGNOSIS — E03.9 ACQUIRED HYPOTHYROIDISM: ICD-10-CM

## 2024-12-03 DIAGNOSIS — I69.359 HEMIPARESIS FOLLOWING CEREBROVASCULAR ACCIDENT (CVA) (HCC): Primary | Chronic | ICD-10-CM

## 2024-12-03 LAB
ALBUMIN: 3.7 G/DL (ref 3.5–5.2)
ALP BLD-CCNC: 109 U/L (ref 35–104)
ALT SERPL-CCNC: 8 U/L (ref 0–32)
ANION GAP SERPL CALCULATED.3IONS-SCNC: 10 MMOL/L (ref 7–16)
AST SERPL-CCNC: 15 U/L (ref 0–31)
BILIRUB SERPL-MCNC: 0.4 MG/DL (ref 0–1.2)
BUN BLDV-MCNC: 24 MG/DL (ref 6–23)
CALCIUM SERPL-MCNC: 9.9 MG/DL (ref 8.6–10.2)
CHLORIDE BLD-SCNC: 101 MMOL/L (ref 98–107)
CHOLESTEROL, TOTAL: 111 MG/DL
CO2: 29 MMOL/L (ref 22–29)
CREAT SERPL-MCNC: 1.8 MG/DL (ref 0.5–1)
GFR, ESTIMATED: 32 ML/MIN/1.73M2
GLUCOSE BLD-MCNC: 98 MG/DL (ref 74–99)
HCT VFR BLD CALC: 39.7 % (ref 34–48)
HDLC SERPL-MCNC: 43 MG/DL
HEMOGLOBIN: 12.1 G/DL (ref 11.5–15.5)
INTERNATIONAL NORMALIZATION RATIO, POC: 2.1
LDL CHOLESTEROL: 48 MG/DL
MCH RBC QN AUTO: 26.1 PG (ref 26–35)
MCHC RBC AUTO-ENTMCNC: 30.5 G/DL (ref 32–34.5)
MCV RBC AUTO: 85.6 FL (ref 80–99.9)
PDW BLD-RTO: 16.9 % (ref 11.5–15)
PLATELET # BLD: 267 K/UL (ref 130–450)
PMV BLD AUTO: 10.7 FL (ref 7–12)
POTASSIUM SERPL-SCNC: 4.6 MMOL/L (ref 3.5–5)
PROTHROMBIN TIME, POC: 24.8
RBC # BLD: 4.64 M/UL (ref 3.5–5.5)
SODIUM BLD-SCNC: 140 MMOL/L (ref 132–146)
T4 FREE: 1.3 NG/DL (ref 0.9–1.7)
TOTAL PROTEIN: 7.7 G/DL (ref 6.4–8.3)
TRIGL SERPL-MCNC: 98 MG/DL
TSH SERPL DL<=0.05 MIU/L-ACNC: 1 UIU/ML (ref 0.27–4.2)
VLDLC SERPL CALC-MCNC: 20 MG/DL
WBC # BLD: 10.3 K/UL (ref 4.5–11.5)

## 2024-12-03 RX ORDER — TOPIRAMATE 25 MG/1
25 TABLET, FILM COATED ORAL NIGHTLY
Qty: 30 TABLET | Refills: 3 | Status: SHIPPED | OUTPATIENT
Start: 2024-12-03

## 2024-12-03 ASSESSMENT — ENCOUNTER SYMPTOMS
BLOOD IN STOOL: 0
ABDOMINAL PAIN: 0
SHORTNESS OF BREATH: 0
DIARRHEA: 0
CONSTIPATION: 0
HAIR LOSS: 0

## 2024-12-03 NOTE — PROGRESS NOTES
Emily Carreno, a female of 64 y.o. came to the office 12/3/2024.     Patient Active Problem List   Diagnosis    OA (osteoarthritis)    Hypothyroidism    JOSE on CPAP    HTN (hypertension)    PTERA (acute kidney injury) ([facility])    PFO with atrial septal aneurysm    Adult BMI >=70 kg/sq m    Rt Hemiparesis following cerebrovascular accident (CVA) ([facility])    Neuropathy    Depression    Venous insufficiency of both lower extremities    FLAVIO (generalized anxiety disorder)    Varicose veins of left leg with edema    Cellulitis of right leg    Iron deficiency anemia    Postmenopausal vaginal bleeding    Complex endometrial hyperplasia with atypia    Lymphedema of both lower extremities    Pneumonia due to COVID-19 virus    Hemiparesis affecting right side as late effect of cerebrovascular accident (CVA) ([facility])    CKD (chronic kidney disease) stage 3, GFR 30-59 ml/min ([facility])    Morbid obesity with BMI of 60.0-69.9, adult    Cervicalgia    DDD (degenerative disc disease), cervical    Cervical spondylosis    Chronic pain of both shoulders          Hypertension  This is a chronic problem. The current episode started more than 1 year ago. The problem is controlled. Associated symptoms include chest pain. Pertinent negatives include no headaches, palpitations, peripheral edema, shortness of breath or sweats. Past treatments include ACE inhibitors. The current treatment provides significant improvement. There are no compliance problems.  Identifiable causes of hypertension include a thyroid problem.   Thyroid Problem  Presents for follow-up visit. Patient reports no constipation, depressed mood, diarrhea, dry skin, fatigue, hair loss, nail problem or palpitations. The symptoms have been stable.    cva: R hand movement and strength without change.   MS: needing new wheelchair due to present being 9 yrs old and falling apart.   General: A wheelchair mobility evaluation was performed today on Emily Carreno.  She is a 64-year-old white female

## 2024-12-05 ENCOUNTER — PATIENT MESSAGE (OUTPATIENT)
Dept: PRIMARY CARE CLINIC | Age: 64
End: 2024-12-05

## 2024-12-05 RX ORDER — DAPAGLIFLOZIN 5 MG/1
5 TABLET, FILM COATED ORAL EVERY MORNING
Qty: 90 TABLET | Refills: 1 | Status: SHIPPED | OUTPATIENT
Start: 2024-12-05

## 2024-12-06 ENCOUNTER — TELEPHONE (OUTPATIENT)
Dept: PRIMARY CARE CLINIC | Age: 64
End: 2024-12-06

## 2024-12-06 NOTE — TELEPHONE ENCOUNTER
Patient called stating she needed ICD10 codes for [employer] as well as an authorization for her in home healthcare.    A referral to [facility] was sent 11/26/24.    I called [facility] and spoke with Jose Rafael Anne and he stated that several messages have been left for Emily with no return calls to set anything up.    I then called patient to inform her of above and provided her with Jose Rafael's cell number.

## 2024-12-13 ENCOUNTER — ANTI-COAG VISIT (OUTPATIENT)
Dept: PRIMARY CARE CLINIC | Age: 64
End: 2024-12-13

## 2024-12-13 DIAGNOSIS — I69.359 HEMIPARESIS FOLLOWING CEREBROVASCULAR ACCIDENT (CVA) (HCC): Primary | ICD-10-CM

## 2024-12-13 DIAGNOSIS — I67.2 CEREBRAL ATHEROSCLEROSIS: ICD-10-CM

## 2024-12-13 LAB
INTERNATIONAL NORMALIZATION RATIO, POC: 1.9
PROTHROMBIN TIME, POC: 0

## 2024-12-17 ENCOUNTER — TELEPHONE (OUTPATIENT)
Dept: PRIMARY CARE CLINIC | Age: 64
End: 2024-12-17

## 2024-12-17 ENCOUNTER — ANTI-COAG VISIT (OUTPATIENT)
Dept: PRIMARY CARE CLINIC | Age: 64
End: 2024-12-17

## 2024-12-17 LAB — INR BLD: 1.5

## 2024-12-17 NOTE — TELEPHONE ENCOUNTER
Emily called twice today needing instructions on how much coumadin to take.    Please call patient

## 2024-12-18 ENCOUNTER — TELEPHONE (OUTPATIENT)
Dept: PRIMARY CARE CLINIC | Age: 64
End: 2024-12-18

## 2024-12-18 NOTE — TELEPHONE ENCOUNTER
Patient states she needs an authorization letter for [employer] stating she needs home health assistance.      Patient stated you have done this in the past for her.    Thank you

## 2024-12-20 ENCOUNTER — TELEPHONE (OUTPATIENT)
Dept: PRIMARY CARE CLINIC | Age: 64
End: 2024-12-20

## 2024-12-20 NOTE — TELEPHONE ENCOUNTER
Pt stated that she is having trouble now urinating. She stated she has thought she had to go and went to the bathroom, but nothing came out

## 2024-12-20 NOTE — TELEPHONE ENCOUNTER
I would like her to stay on it because I want to help protect her kidneys.  The increased urination should eventually slow down to a point where it is tolerable.  If she is able to give a little bit more time I would like to see how she does.

## 2024-12-20 NOTE — TELEPHONE ENCOUNTER
Pt called in stating that she just went to the restroom and she did really go. She stated that she should be okay.       I also did let her know what you stated.

## 2024-12-20 NOTE — TELEPHONE ENCOUNTER
Pt called in stating that you had started her on Farxiga. She stated that she is now not urinating a lot. She wants to stop taking the medication and wants to know if that is okay?

## 2024-12-24 ENCOUNTER — ANTI-COAG VISIT (OUTPATIENT)
Dept: PRIMARY CARE CLINIC | Age: 64
End: 2024-12-24
Payer: MEDICARE

## 2024-12-24 DIAGNOSIS — I69.359 HEMIPARESIS FOLLOWING CEREBROVASCULAR ACCIDENT (CVA) (HCC): Primary | ICD-10-CM

## 2024-12-24 DIAGNOSIS — I67.2 CEREBRAL ATHEROSCLEROSIS: ICD-10-CM

## 2024-12-24 LAB
INTERNATIONAL NORMALIZATION RATIO, POC: 1.7
PROTHROMBIN TIME, POC: 0

## 2024-12-24 PROCEDURE — 85610 PROTHROMBIN TIME: CPT | Performed by: FAMILY MEDICINE

## 2024-12-24 NOTE — PROGRESS NOTES
Increase coumadin 5 mg to 4 days a wk and 2.5 mg 3 days a wk alternating. Recheck inr as scheduled.

## 2024-12-28 DIAGNOSIS — R20.2 NUMBNESS AND TINGLING OF RIGHT ARM AND LEG: ICD-10-CM

## 2024-12-28 DIAGNOSIS — L30.9 DERMATITIS: ICD-10-CM

## 2024-12-28 DIAGNOSIS — I69.359 HEMIPARESIS FOLLOWING CEREBROVASCULAR ACCIDENT (CVA) (HCC): Chronic | ICD-10-CM

## 2024-12-28 DIAGNOSIS — N39.41 URGE INCONTINENCE: ICD-10-CM

## 2024-12-28 DIAGNOSIS — F41.9 ANXIETY: ICD-10-CM

## 2024-12-28 DIAGNOSIS — E78.5 HYPERLIPIDEMIA, UNSPECIFIED HYPERLIPIDEMIA TYPE: ICD-10-CM

## 2024-12-28 DIAGNOSIS — R20.0 NUMBNESS AND TINGLING OF RIGHT ARM AND LEG: ICD-10-CM

## 2024-12-30 RX ORDER — CITALOPRAM HYDROBROMIDE 40 MG/1
40 TABLET ORAL DAILY
Qty: 90 TABLET | Refills: 1 | Status: SHIPPED | OUTPATIENT
Start: 2024-12-30

## 2024-12-30 RX ORDER — GABAPENTIN 600 MG/1
TABLET ORAL
Qty: 225 TABLET | Refills: 1 | Status: SHIPPED | OUTPATIENT
Start: 2024-12-30 | End: 2025-06-27

## 2024-12-30 RX ORDER — ATORVASTATIN CALCIUM 20 MG/1
20 TABLET, FILM COATED ORAL DAILY
Qty: 90 TABLET | Refills: 1 | Status: SHIPPED | OUTPATIENT
Start: 2024-12-30

## 2024-12-30 RX ORDER — TOPIRAMATE 25 MG/1
25 TABLET, FILM COATED ORAL NIGHTLY
Qty: 90 TABLET | Refills: 0 | Status: SHIPPED | OUTPATIENT
Start: 2024-12-30

## 2024-12-30 RX ORDER — LORATADINE 10 MG/1
5 TABLET ORAL DAILY
Qty: 45 TABLET | Refills: 1 | Status: SHIPPED | OUTPATIENT
Start: 2024-12-30

## 2024-12-30 RX ORDER — SOLIFENACIN SUCCINATE 10 MG/1
10 TABLET, FILM COATED ORAL DAILY
Qty: 90 TABLET | Refills: 1 | Status: SHIPPED | OUTPATIENT
Start: 2024-12-30

## 2024-12-30 RX ORDER — WARFARIN SODIUM 5 MG/1
TABLET ORAL
Qty: 90 TABLET | Refills: 1 | Status: SHIPPED | OUTPATIENT
Start: 2024-12-30

## 2024-12-30 RX ORDER — TRIAMCINOLONE ACETONIDE 1 MG/G
CREAM TOPICAL
Qty: 80 G | Refills: 1 | Status: SHIPPED | OUTPATIENT
Start: 2024-12-30

## 2024-12-30 RX ORDER — BACLOFEN 10 MG/1
10 TABLET ORAL 3 TIMES DAILY PRN
Qty: 90 TABLET | Refills: 1 | Status: SHIPPED | OUTPATIENT
Start: 2024-12-30

## 2024-12-30 RX ORDER — LEVOTHYROXINE SODIUM 150 UG/1
150 TABLET ORAL DAILY
Qty: 90 TABLET | Refills: 1 | Status: SHIPPED | OUTPATIENT
Start: 2024-12-30

## 2025-01-06 ENCOUNTER — TELEPHONE (OUTPATIENT)
Dept: PRIMARY CARE CLINIC | Age: 65
End: 2025-01-06

## 2025-01-06 NOTE — TELEPHONE ENCOUNTER
Please call in a referral  for home health care.    Call to:[facility]    Fax:  [phone]    Juan Byrne from [facility] stated he is trying to help out the patient due to her not being covered by her insurance with their company.  Juan stated he has contacted [facility] and they will call and set up visits for Emily.      Thank you

## 2025-01-07 ENCOUNTER — ANTI-COAG VISIT (OUTPATIENT)
Dept: PRIMARY CARE CLINIC | Age: 65
End: 2025-01-07

## 2025-01-07 LAB — INR BLD: 1.4

## 2025-01-07 NOTE — PROGRESS NOTES
Spoke with patient's .  She is increased her Coumadin 5 mg from 2 days a week to 4 days a week with 2.5 mg every other day.  Her Coumadin INR lab was 1.4.  Recheck INR 1 week

## 2025-01-23 ENCOUNTER — TELEPHONE (OUTPATIENT)
Dept: PRIMARY CARE CLINIC | Age: 65
End: 2025-01-23

## 2025-01-23 NOTE — TELEPHONE ENCOUNTER
Juan from [facility] called today w/more info on quest for HC for pt. Recent calls in the area have fallen through. He has contacted facility in [facility] that will consider taking pt; they are asking for  pt demo, diagnosis info and scrip for care.  Info will be sent:    [facility]  #[phone]  Fax#  [phone]

## 2025-01-24 NOTE — TELEPHONE ENCOUNTER
Cory from [facility] called today, said he needs more info to understand pt needs, last OV note not enough  His phone  [phone]  #[phone]

## 2025-02-04 ENCOUNTER — TELEPHONE (OUTPATIENT)
Dept: PRIMARY CARE CLINIC | Age: 65
End: 2025-02-04

## 2025-02-04 DIAGNOSIS — I69.359 HEMIPARESIS FOLLOWING CEREBROVASCULAR ACCIDENT (CVA) (HCC): Primary | ICD-10-CM

## 2025-02-04 NOTE — TELEPHONE ENCOUNTER
[facility] called and is asking if you can place a referral to them, but it needs to have orders for:    Home health  OT  PT   Skilled nursing   nitin    I will fax to Pamela @ [phone]    Thank you

## 2025-02-09 DIAGNOSIS — I69.359 HEMIPARESIS FOLLOWING CEREBROVASCULAR ACCIDENT (CVA) (HCC): Chronic | ICD-10-CM

## 2025-02-09 DIAGNOSIS — I10 PRIMARY HYPERTENSION: ICD-10-CM

## 2025-02-10 RX ORDER — WARFARIN SODIUM 5 MG/1
TABLET ORAL
Qty: 90 TABLET | Refills: 1 | Status: SHIPPED | OUTPATIENT
Start: 2025-02-10

## 2025-02-10 RX ORDER — LISINOPRIL 20 MG/1
20 TABLET ORAL DAILY
Qty: 90 TABLET | Refills: 1 | Status: SHIPPED | OUTPATIENT
Start: 2025-02-10

## 2025-02-12 ENCOUNTER — TELEPHONE (OUTPATIENT)
Dept: PRIMARY CARE CLINIC | Age: 65
End: 2025-02-12

## 2025-02-12 NOTE — TELEPHONE ENCOUNTER
Pamela from [facility] called stating patient has refused all PT/OT and nursing.

## 2025-02-21 ENCOUNTER — PATIENT MESSAGE (OUTPATIENT)
Dept: PRIMARY CARE CLINIC | Age: 65
End: 2025-02-21

## 2025-03-03 ENCOUNTER — PATIENT MESSAGE (OUTPATIENT)
Dept: PRIMARY CARE CLINIC | Age: 65
End: 2025-03-03

## 2025-03-03 DIAGNOSIS — R53.1 GENERALIZED WEAKNESS: Primary | ICD-10-CM

## 2025-03-03 DIAGNOSIS — M79.606 PAIN OF LOWER EXTREMITY, UNSPECIFIED LATERALITY: ICD-10-CM

## 2025-03-06 SDOH — ECONOMIC STABILITY: INCOME INSECURITY: IN THE LAST 12 MONTHS, WAS THERE A TIME WHEN YOU WERE NOT ABLE TO PAY THE MORTGAGE OR RENT ON TIME?: NO

## 2025-03-06 SDOH — ECONOMIC STABILITY: FOOD INSECURITY: WITHIN THE PAST 12 MONTHS, YOU WORRIED THAT YOUR FOOD WOULD RUN OUT BEFORE YOU GOT MONEY TO BUY MORE.: NEVER TRUE

## 2025-03-06 SDOH — ECONOMIC STABILITY: TRANSPORTATION INSECURITY
IN THE PAST 12 MONTHS, HAS THE LACK OF TRANSPORTATION KEPT YOU FROM MEDICAL APPOINTMENTS OR FROM GETTING MEDICATIONS?: NO

## 2025-03-06 SDOH — ECONOMIC STABILITY: FOOD INSECURITY: WITHIN THE PAST 12 MONTHS, THE FOOD YOU BOUGHT JUST DIDN'T LAST AND YOU DIDN'T HAVE MONEY TO GET MORE.: NEVER TRUE

## 2025-03-07 ENCOUNTER — TELEMEDICINE (OUTPATIENT)
Dept: PRIMARY CARE CLINIC | Age: 65
End: 2025-03-07

## 2025-03-07 DIAGNOSIS — N18.32 STAGE 3B CHRONIC KIDNEY DISEASE (HCC): ICD-10-CM

## 2025-03-07 DIAGNOSIS — R20.0 RIGHT LEG NUMBNESS: Primary | ICD-10-CM

## 2025-03-07 DIAGNOSIS — R53.1 GENERALIZED WEAKNESS: ICD-10-CM

## 2025-03-07 DIAGNOSIS — I69.359 HEMIPARESIS FOLLOWING CEREBROVASCULAR ACCIDENT (CVA) (HCC): ICD-10-CM

## 2025-03-07 PROBLEM — N18.30 CKD (CHRONIC KIDNEY DISEASE) STAGE 3, GFR 30-59 ML/MIN (HCC): Status: RESOLVED | Noted: 2021-06-02 | Resolved: 2025-03-07

## 2025-03-07 NOTE — PROGRESS NOTES
daily.  Bryan Disla, DO   topiramate (TOPAMAX) 25 MG tablet Take 1 tablet by mouth nightly  Bryan Disla,    dapagliflozin (FARXIGA) 5 MG tablet Take 1 tablet by mouth every morning  Bryan Disla, DO   busPIRone (BUSPAR) 15 MG tablet Take 15 mg by mouth 2 times daily  Bryan Disla,    vitamin D (ERGOCALCIFEROL) 1.25 MG (70330 UT) CAPS capsule Take 1 capsule by mouth once a week  Bryan Disla,    warfarin (COUMADIN) 2.5 MG tablet Take daily as directed  Bryan Disla, DO   Lift Chair MISC by Does not apply route  Bryan Disla,    Hospital Bed MISC by Does not apply route Semiautomatic  Bryan Disla, DO   Misc. Devices MISC Cpap humidifier hose  Bryan Disla DO   Respiratory Therapy Supplies (CARETOUCH CPAP & BIPAP HOSE) MISC 1 humidified CPAP hose needed  Bryan Disla DO   Disposable Gloves (LATEX GLOVES MEDIUM) MISC Use as needed.  Bryan Disla, DO   acetaminophen (TYLENOL ARTHRITIS PAIN) 650 MG extended release tablet Take 2 tablets by mouth every 8 hours as needed for Pain  ProviderJose MD   CPAP Machine MISC by Does not apply route  ProviderJose MD   levonorgestrel (MIRENA, 52 MG,) IUD 52 mg 1 each by IntraUTERine route once  ProviderJose MD   Misc. Devices (GEL-FOAM CUSHION) MISC For chair  Bryan Disla, DO       Social History     Tobacco Use    Smoking status: Never    Smokeless tobacco: Never   Vaping Use    Vaping status: Never Used   Substance Use Topics    Alcohol use: No    Drug use: No            PHYSICAL EXAMINATION:  [ INSTRUCTIONS:  \"[x]\" Indicates a positive item  \"[]\" Indicates a negative item  -- DELETE ALL ITEMS NOT EXAMINED]  Vital Signs: (As obtained by patient/caregiver or practitioner observation)    Blood pressure-  Heart rate-    Respiratory rate-    Temperature-  Pulse oximetry-     Constitutional: [x] Appears well-developed

## 2025-03-12 DIAGNOSIS — R20.0 NUMBNESS AND TINGLING OF RIGHT ARM AND LEG: ICD-10-CM

## 2025-03-12 DIAGNOSIS — F41.9 ANXIETY: ICD-10-CM

## 2025-03-12 DIAGNOSIS — N39.41 URGE INCONTINENCE: ICD-10-CM

## 2025-03-12 DIAGNOSIS — R20.2 NUMBNESS AND TINGLING OF RIGHT ARM AND LEG: ICD-10-CM

## 2025-03-12 RX ORDER — ERGOCALCIFEROL 1.25 MG/1
50000 CAPSULE, LIQUID FILLED ORAL WEEKLY
Qty: 12 CAPSULE | Refills: 1 | Status: SHIPPED | OUTPATIENT
Start: 2025-03-12

## 2025-03-12 RX ORDER — SOLIFENACIN SUCCINATE 10 MG/1
10 TABLET, FILM COATED ORAL DAILY
Qty: 90 TABLET | Refills: 1 | Status: SHIPPED | OUTPATIENT
Start: 2025-03-12

## 2025-03-12 RX ORDER — BUSPIRONE HYDROCHLORIDE 15 MG/1
15 TABLET ORAL 2 TIMES DAILY
Qty: 180 TABLET | Refills: 1 | Status: SHIPPED | OUTPATIENT
Start: 2025-03-12

## 2025-03-12 RX ORDER — GABAPENTIN 600 MG/1
TABLET ORAL
Qty: 225 TABLET | Refills: 1 | Status: SHIPPED | OUTPATIENT
Start: 2025-03-12 | End: 2025-09-07

## 2025-03-12 RX ORDER — LORATADINE 10 MG/1
5 TABLET ORAL DAILY
Qty: 45 TABLET | Refills: 1 | Status: SHIPPED | OUTPATIENT
Start: 2025-03-12

## 2025-03-12 NOTE — TELEPHONE ENCOUNTER
Patients last appointment 3/7/2025.  Patients next scheduled appointment No future appointments.

## 2025-03-18 ENCOUNTER — ANTI-COAG VISIT (OUTPATIENT)
Dept: PRIMARY CARE CLINIC | Age: 65
End: 2025-03-18

## 2025-03-18 LAB — INR BLD: 4

## 2025-03-18 NOTE — PROGRESS NOTES
Decrease Coumadin of 5 mg that she takes Monday, Wednesdays and Fridays to just Monday and Fridays.  Take 2.5 mg all other days.  Check INR as scheduled

## 2025-03-19 ENCOUNTER — TELEPHONE (OUTPATIENT)
Dept: PRIMARY CARE CLINIC | Age: 65
End: 2025-03-19

## 2025-03-19 NOTE — TELEPHONE ENCOUNTER
Spoke with patient says her vesicare is not covered she is waiting on book from insurance to see what is covered.

## 2025-03-21 ENCOUNTER — TELEPHONE (OUTPATIENT)
Dept: PRIMARY CARE CLINIC | Age: 65
End: 2025-03-21

## 2025-03-21 DIAGNOSIS — I69.359 HEMIPARESIS FOLLOWING CEREBROVASCULAR ACCIDENT (CVA) (HCC): Primary | ICD-10-CM

## 2025-03-21 RX ORDER — DAPAGLIFLOZIN 5 MG/1
5 TABLET, FILM COATED ORAL EVERY MORNING
Qty: 90 TABLET | Refills: 1 | Status: CANCELLED | OUTPATIENT
Start: 2025-03-21

## 2025-03-21 NOTE — TELEPHONE ENCOUNTER
Patient said she has to pay for vesicare is there something else she can try that will be a little cheaper?

## 2025-03-26 DIAGNOSIS — F41.9 ANXIETY: ICD-10-CM

## 2025-03-26 DIAGNOSIS — I69.359 HEMIPARESIS FOLLOWING CEREBROVASCULAR ACCIDENT (CVA) (HCC): Chronic | ICD-10-CM

## 2025-03-26 RX ORDER — CITALOPRAM HYDROBROMIDE 40 MG/1
40 TABLET ORAL DAILY
Qty: 90 TABLET | Refills: 1 | Status: SHIPPED | OUTPATIENT
Start: 2025-03-26

## 2025-03-26 RX ORDER — BACLOFEN 10 MG/1
10 TABLET ORAL 3 TIMES DAILY PRN
Qty: 90 TABLET | Refills: 1 | Status: SHIPPED | OUTPATIENT
Start: 2025-03-26

## 2025-03-27 ENCOUNTER — TELEPHONE (OUTPATIENT)
Dept: PRIMARY CARE CLINIC | Age: 65
End: 2025-03-27

## 2025-03-27 DIAGNOSIS — I89.0 LYMPHEDEMA OF BOTH LOWER EXTREMITIES: Primary | ICD-10-CM

## 2025-03-27 NOTE — TELEPHONE ENCOUNTER
Pt called in stating that she wanted you to order a lymphedema pump. She stated that she does not know where the yeny lift came from because she does not need that.     She said Colleen at home sent something over stating that she needs a lymphedema pump.

## 2025-03-27 NOTE — TELEPHONE ENCOUNTER
Order for lymphedema pump placed   Take Bentyl every 6 hours as needed for your abdominal cramps.  We will call you in a few days regarding your swabs and urine culture results.  Please follow-up with your OBGYN for further evaluation.  We will call in a medication if need to be treated.    What care is needed at home?   Call your regular doctor to let them know you were in the ED. Make a follow-up appointment if you were told to.  Practice good hygiene. Use clear water to wash the skin outside your vagina. Do not wash inside the vagina. Take showers instead of baths. Avoid bubble baths. Pat your skin dry with a clean towel.  Keep moist areas of your body clean, cool, and dry.  Avoid products that may irritate your skin. Do not use douches, feminine sprays, pads, body wash, or bubble bath.  Wear cotton underwear. Avoid tight-fitting pants, leggings, or shorts.  Change your wet bathing suit or damp workout clothes as soon as possible.  When do I need to get emergency help?   Return to the ED if:   You notice bleeding with the yeast infection, and you are not having your period.  You develop pain in your genital or pelvic area during sexual activity.  You have a fever of 100.4°F (38°C) or higher or chills.  When do I need to call the doctor?   Your discharge continues after you have finished the treatment.  You have new or worsening symptoms.  - Rest.    - Drink plenty of fluids.    - Acetaminophen (tylenol) or Ibuprofen (advil,motrin) as directed as needed for fever/pain. Avoid tylenol if you have a history of liver disease. Do not take ibuprofen if you have a history of GI bleeding, kidney disease, or if you take blood thinners.     - Follow up with your PCP or specialty clinic as directed in the next 1-2 weeks if not improved or as needed.  You can call [phone] to schedule an appointment with the appropriate provider.    - Go to the ER or seek medical attention immediately if you develop new or worsening symptoms.     - You must understand  that you have received an Urgent Care treatment only and that you may be released before all of your medical problems are known or treated.   - You, the patient, will arrange for follow up care as instructed.   - If your condition worsens or fails to improve we recommend that you receive another evaluation at the ER immediately or contact your PCP to discuss your concerns or return here.

## 2025-03-27 NOTE — TELEPHONE ENCOUNTER
Spoke with patient and she said that is not what she wanted she needs a pump to wrap around her legs? For her lymphadema

## 2025-04-14 ENCOUNTER — PATIENT MESSAGE (OUTPATIENT)
Dept: PRIMARY CARE CLINIC | Age: 65
End: 2025-04-14

## 2025-04-14 DIAGNOSIS — I89.0 LYMPHEDEMA OF BOTH LOWER EXTREMITIES: Primary | ICD-10-CM

## 2025-04-22 ENCOUNTER — PATIENT MESSAGE (OUTPATIENT)
Dept: PRIMARY CARE CLINIC | Age: 65
End: 2025-04-22

## 2025-04-23 ENCOUNTER — ANTI-COAG VISIT (OUTPATIENT)
Dept: PRIMARY CARE CLINIC | Age: 65
End: 2025-04-23
Payer: MEDICARE

## 2025-04-23 LAB — INTERNATIONAL NORMALIZATION RATIO, POC: 1.6

## 2025-04-23 PROCEDURE — 85610 PROTHROMBIN TIME: CPT | Performed by: FAMILY MEDICINE

## 2025-04-23 NOTE — PROGRESS NOTES
Increase her Coumadin 5 mg dose that she takes on Monday Wednesday and Friday and add taking it on Sundays.  Take 2.5 mg on Tuesdays Thursdays and Saturdays

## 2025-05-06 ENCOUNTER — ANTI-COAG VISIT (OUTPATIENT)
Dept: PRIMARY CARE CLINIC | Age: 65
End: 2025-05-06

## 2025-05-06 ENCOUNTER — TELEPHONE (OUTPATIENT)
Dept: PRIMARY CARE CLINIC | Age: 65
End: 2025-05-06

## 2025-05-06 ENCOUNTER — CLINICAL DOCUMENTATION (OUTPATIENT)
Dept: PRIMARY CARE CLINIC | Age: 65
End: 2025-05-06

## 2025-05-06 LAB — INR BLD: 1.6

## 2025-05-06 NOTE — PROGRESS NOTES
Increase Coumadin from 5 mg on Monday Wednesday Friday to Monday, Wednesday, Fridays, and now Sundays with 2.5 mg on the other 3 days.  Recheck INR 1 week

## 2025-05-24 DIAGNOSIS — L30.9 DERMATITIS: ICD-10-CM

## 2025-05-24 DIAGNOSIS — R20.0 NUMBNESS AND TINGLING OF RIGHT ARM AND LEG: ICD-10-CM

## 2025-05-24 DIAGNOSIS — I69.359 HEMIPARESIS FOLLOWING CEREBROVASCULAR ACCIDENT (CVA) (HCC): Chronic | ICD-10-CM

## 2025-05-24 DIAGNOSIS — R20.2 NUMBNESS AND TINGLING OF RIGHT ARM AND LEG: ICD-10-CM

## 2025-05-27 RX ORDER — WARFARIN SODIUM 2.5 MG/1
TABLET ORAL
Qty: 90 TABLET | Refills: 1 | Status: SHIPPED | OUTPATIENT
Start: 2025-05-27

## 2025-05-27 RX ORDER — LORATADINE 10 MG/1
10 TABLET ORAL DAILY
Qty: 90 TABLET | Refills: 1 | Status: SHIPPED | OUTPATIENT
Start: 2025-05-27

## 2025-05-27 RX ORDER — TRIAMCINOLONE ACETONIDE 1 MG/G
CREAM TOPICAL
Qty: 80 G | Refills: 1 | Status: SHIPPED | OUTPATIENT
Start: 2025-05-27

## 2025-05-27 RX ORDER — GABAPENTIN 600 MG/1
TABLET ORAL
Qty: 225 TABLET | Refills: 1 | Status: SHIPPED | OUTPATIENT
Start: 2025-05-27 | End: 2025-11-19

## 2025-05-27 RX ORDER — WARFARIN SODIUM 5 MG/1
TABLET ORAL
Qty: 90 TABLET | Refills: 1 | Status: SHIPPED | OUTPATIENT
Start: 2025-05-27

## 2025-06-03 ENCOUNTER — ANTI-COAG VISIT (OUTPATIENT)
Dept: PRIMARY CARE CLINIC | Age: 65
End: 2025-06-03

## 2025-06-03 LAB — INR BLD: 3.9

## 2025-06-03 NOTE — PROGRESS NOTES
Decreased 5 mg Coumadin that she takes every Monday Wednesday and Friday to just Mondays and Fridays.  Make all other days 2.5 mg.  Recheck home INR 1 week

## 2025-06-24 ENCOUNTER — ANTI-COAG VISIT (OUTPATIENT)
Dept: PRIMARY CARE CLINIC | Age: 65
End: 2025-06-24

## 2025-06-24 LAB
INR BLD: 4.5
INR BLD: 4.9

## 2025-06-25 ENCOUNTER — ANTI-COAG VISIT (OUTPATIENT)
Dept: PRIMARY CARE CLINIC | Age: 65
End: 2025-06-25

## 2025-06-26 ENCOUNTER — TELEPHONE (OUTPATIENT)
Dept: PRIMARY CARE CLINIC | Age: 65
End: 2025-06-26

## 2025-06-26 DIAGNOSIS — I89.0 LYMPHEDEMA OF BOTH LOWER EXTREMITIES: Primary | ICD-10-CM

## 2025-06-26 NOTE — TELEPHONE ENCOUNTER
Pt needs a script for her lymphedema pump sleeves faxed to [facility]    Fax number [phone]

## 2025-06-27 NOTE — TELEPHONE ENCOUNTER
Emily needs the sleeves for her legs and the tubing. She already has the pump    I will fax when done    Thank you

## 2025-06-27 NOTE — TELEPHONE ENCOUNTER
I have written a prescription for her to be sent into get the lymphedema sleeves and tubing.  We can fax it in

## 2025-07-01 ENCOUNTER — TELEPHONE (OUTPATIENT)
Dept: PRIMARY CARE CLINIC | Age: 65
End: 2025-07-01

## 2025-07-01 LAB — INR BLD: 1.5

## 2025-07-02 ENCOUNTER — ANTI-COAG VISIT (OUTPATIENT)
Dept: PRIMARY CARE CLINIC | Age: 65
End: 2025-07-02

## 2025-07-14 DIAGNOSIS — I69.359 HEMIPARESIS FOLLOWING CEREBROVASCULAR ACCIDENT (CVA) (HCC): Chronic | ICD-10-CM

## 2025-07-14 RX ORDER — BACLOFEN 10 MG/1
10 TABLET ORAL 3 TIMES DAILY PRN
Qty: 90 TABLET | Refills: 1 | Status: SHIPPED | OUTPATIENT
Start: 2025-07-14

## 2025-07-15 ENCOUNTER — ANTI-COAG VISIT (OUTPATIENT)
Dept: PRIMARY CARE CLINIC | Age: 65
End: 2025-07-15

## 2025-07-15 LAB — INR BLD: 3.4

## 2025-07-17 NOTE — PROGRESS NOTES
Emily Carreno (:  1960) is a 65 y.o. female,{New vs Established:[id number]::\"Established patient\"}, here for evaluation of the following chief complaint(s):  No chief complaint on file.      Assessment & Plan      No follow-ups on file.    Subjective   {?Quick Links Last Outpt Note  Snapshot  Edit RFV/CC  Edit Screenings:[id number]}    HPI  Review of Systems    {?Quick Review   Review Full History       Meds -    baclofen (LIORESAL) 10 MG tablet    LYMPHEDEMA PUMP    warfarin (COUMADIN) 2.5 MG tablet    triamcinolone (KENALOG) 0.1 % cream    warfarin (COUMADIN) 5 MG tablet    diclofenac sodium (VOLTAREN) 1 % GEL    loratadine (CLARITIN) 10 MG tablet    gabapentin (NEURONTIN) 600 MG tablet    Lift Chair MISC    LYMPHEDEMA PUMP    citalopram (CELEXA) 40 MG tablet    busPIRone (BUSPAR) 15 MG tablet    vitamin D (ERGOCALCIFEROL) 1.25 MG (06181 UT) CAPS capsule    solifenacin (VESICARE) 10 MG tablet    lisinopril (PRINIVIL;ZESTRIL) 20 MG tablet    diclofenac sodium (VOLTAREN) 1 % GEL    atorvastatin (LIPITOR) 20 MG tablet    levothyroxine (SYNTHROID) 150 MCG tablet    topiramate (TOPAMAX) 25 MG tablet    dapagliflozin (FARXIGA) 5 MG tablet    Hospital Bed MISC    Misc. Devices MISC    Respiratory Therapy Supplies (CARETOUCH CPAP & BIPAP HOSE) MISC    Disposable Gloves (LATEX GLOVES MEDIUM) MISC    acetaminophen (TYLENOL ARTHRITIS PAIN) 650 MG extended release tablet    CPAP Machine MISC    levonorgestrel (MIRENA, 52 MG,) IUD 52 mg    Misc. Devices (GEL-FOAM CUSHION) MISC   ---  PMH -  Cerebral artery occlusion with cerebral infarction (HCC)  Depression  FLAVIO (generalized anxiety disorder)  Hypertension  Hypoprothrombinemia (HCC)  Hypothyroidism  Lymphedema of both lower extremities  Morbid obesity with BMI of 60.0-69.9, adult (HCC)  Neuropathy  JOSE on CPAP  Osteoarthritis  PFO with atrial septal aneurysm  Pneumonia due to COVID-19 virus  Rt Hemiparesis following cerebrovascular accident (CVA) ([facility])  TIA

## 2025-07-28 ENCOUNTER — TELEPHONE (OUTPATIENT)
Dept: PRIMARY CARE CLINIC | Age: 65
End: 2025-07-28

## 2025-07-28 NOTE — TELEPHONE ENCOUNTER
[facility] is faxing a list of requirements for Emily's lymphedema pump. If doctor can addend to his note.     Then we will fax to them    They stated they called earlier also